# Patient Record
Sex: FEMALE | Race: WHITE | Employment: FULL TIME | URBAN - METROPOLITAN AREA
[De-identification: names, ages, dates, MRNs, and addresses within clinical notes are randomized per-mention and may not be internally consistent; named-entity substitution may affect disease eponyms.]

---

## 2017-02-18 DIAGNOSIS — K21.9 GASTROESOPHAGEAL REFLUX DISEASE, ESOPHAGITIS PRESENCE NOT SPECIFIED: ICD-10-CM

## 2017-02-18 NOTE — TELEPHONE ENCOUNTER
Omeprazole DR 20MG Capsule      Last Written Prescription Date: 10/25/2016  Last Fill Quantity: 60,  # refills: 1   Last Office Visit with [facility], [facility] or [facility] prescribing provider: 10/25/2016

## 2017-02-20 NOTE — TELEPHONE ENCOUNTER
Prescription approved per [facility] Refill Protocol.  Sophia Pringle, RN  Triage Nurse

## 2017-03-02 ENCOUNTER — OFFICE VISIT (OUTPATIENT)
Dept: FAMILY MEDICINE | Facility: CLINIC | Age: 47
End: 2017-03-02
Payer: COMMERCIAL

## 2017-03-02 VITALS
DIASTOLIC BLOOD PRESSURE: 76 MMHG | SYSTOLIC BLOOD PRESSURE: 132 MMHG | BODY MASS INDEX: 34.06 KG/M2 | TEMPERATURE: 99.7 F | OXYGEN SATURATION: 98 % | WEIGHT: 211 LBS | HEART RATE: 97 BPM | RESPIRATION RATE: 16 BRPM

## 2017-03-02 DIAGNOSIS — J01.90 ACUTE SINUSITIS WITH SYMPTOMS > 10 DAYS: Primary | ICD-10-CM

## 2017-03-02 DIAGNOSIS — J20.9 ACUTE BRONCHITIS WITH SYMPTOMS > 10 DAYS: ICD-10-CM

## 2017-03-02 PROCEDURE — 99214 OFFICE O/P EST MOD 30 MIN: CPT | Performed by: PHYSICIAN ASSISTANT

## 2017-03-02 RX ORDER — BRIMONIDINE TARTRATE 1 MG/ML
SOLUTION/ DROPS OPHTHALMIC
Refills: 2 | COMMUNITY
Start: 2017-02-24 | End: 2021-06-04

## 2017-03-02 RX ORDER — BENZONATATE 100 MG/1
100 CAPSULE ORAL 3 TIMES DAILY PRN
Qty: 42 CAPSULE | Refills: 0 | Status: SHIPPED | OUTPATIENT
Start: 2017-03-02 | End: 2017-05-11

## 2017-03-02 RX ORDER — ALBUTEROL SULFATE 90 UG/1
2 AEROSOL, METERED RESPIRATORY (INHALATION) EVERY 6 HOURS PRN
Qty: 1 INHALER | Refills: 0 | Status: SHIPPED | OUTPATIENT
Start: 2017-03-02 | End: 2021-06-04

## 2017-03-02 NOTE — MR AVS SNAPSHOT
After Visit Summary   3/2/2017    Mira Luis    MRN: [id number]           Patient Information     Date Of Birth          1970        Visit Information        Provider Department      3/2/2017 2:20 PM Chana Carlin PA-C [facility]        Today's Diagnoses     Acute sinusitis with symptoms > 10 days    -  1    Acute bronchitis with symptoms > 10 days          Care Instructions      Acute Bacterial Rhinosinusitis (ABRS)  Acute bacterial rhinosinusitis (ABRS) is an infection of your nasal cavity and sinuses. It s caused by bacteria. Acute means that you ve had symptoms for less than 12 weeks.  Understanding your sinuses  The nasal cavity is the large air-filled space behind your nose. The sinuses are a group of spaces formed by the bones of your face. They connect with your nasal cavity. ABRS causes the tissue lining these spaces to become inflamed. Mucus may not drain normally. This leads to facial pain and other symptoms.  What causes ABRS?  ABRS most often follows an upper respiratory infection caused by a virus. Bacteria then infect the lining of your nasal cavity and sinuses. But you can also get ABRS if you have:    Nasal allergies    Long-term nasal swelling and congestion not caused by allergies    Blockage in the nose  Symptoms of ABRS  The symptoms of ABRS may be different for each person, and can include:    Nasal congestion    Runny nose    Fluid draining from the nose down the throat (postnasal drip)    Headache    Cough    Pain in the sinuses    Thick, colored fluid from the nose (mucus)    Fever  Diagnosing ABRS  ABRS may be diagnosed if you ve had an upper respiratory infection like a cold and cough for longer than 10 to 14 days. Your health care provider will ask about your symptoms and your medical history. The provider will check your vital signs, including your temperature. You ll have a physical exam. The health care provider will check your ears, nose,  and throat. You likely won t need any tests. If ABRS comes back, you may have a culture or other tests.  Treatment for ABRS  Treatment may include:    Antibiotic medicine. This is for symptoms that last for at least 10 to 14 days.    Nasal corticosteroid medicine. Drops or spray used in the nose can lessen swelling and congestion.    Over-the-counter pain medicine. This is to lessen sinus pain and pressure.    Nasal decongestant medicine. Spray or drops may help to lessen congestion. Do not use them for more than a few days.    Salt wash (saline irrigation). This can help to loosen mucus.  Possible complications of ABRS  ABRS may come back or become long-term (chronic).  In rare cases, ABRS may cause complications such as:     Inflamed tissue around the brain and spinal cord (meningitis)    Inflamed tissue around the eyes (orbital cellulitis)    Inflamed bones around the sinuses (osteitis)  These problems may need to be treated in a hospital with intravenous (IV) antibiotic medicine or surgery.  When to call the health care provider  Call your health care provider if you have any of the following:    Symptoms that don t get better, or get worse    Symptoms that don t get better after 3 to 5 days on antibiotics    Trouble seeing    Swelling around your eyes    Confusion or trouble staying awake        4301-3181 [employer]. [address] [place] [address], [place] [address]. All rights reserved. This information is not intended as a substitute for professional medical care. Always follow your healthcare professional's instructions.        What Is Acute Bronchitis?  Acute or short-term bronchitis last for days or weeks. It occurs when the bronchial tubes (airways in the lungs) are irritated by a virus, bacteria, or allergen. This causes a cough that produces yellow or greenish mucus.  Inside healthy lungs    Air travels in and out of the lungs through the airways. The linings of these airways produce sticky  mucus. This mucus traps particles that enter the lungs. Tiny structures called cilia then sweep the particles out of the airways.     Healthy airway: Airways are normally open. Air moves in and out easily.      Healthy cilia: Tiny, hairlike cilia sweep mucus and particles up and out of the airways.   Lungs with bronchitis  Bronchitis often occurs with a cold or the flu virus. The airways become inflamed (red and swollen). There is a deep  hacking  cough from the extra mucus. Other symptoms may include:    Wheezing    Chest discomfort    Shortness of breath    Mild fever  A second infection, this time due to bacteria, may then occur. And airways irritated by allergens or smoke are more likely to get infected.        Inflamed airway: Inflammation and extra mucus narrow the airway, causing shortness of breath.      Impaired cilia: Extra mucus impairs cilia, causing congestion and wheezing. Smoking makes the problem worse.   Making a diagnosis  A physical exam, health history, and certain tests help your healthcare provider make the diagnosis.  Health history  Your healthcare provider will ask you about your symptoms.  The exam  Your provider listens to your chest for signs of congestion. He or she may also check your ears, nose, and throat.  Possible tests    A sputum test for bacteria. This requires a sample of mucus from the lungs.    A nasal or throat swab for bacterial infection.    A chest X-ray if your healthcare provider thinks you have pneumonia.    Tests to check for an underlying condition, such as allergies, asthma, or COPD. You may need to see a specialist for more lung function testing.  Treating a cough  The main treatment for bronchitis is easing symptoms. Avoiding smoke, allergens, and other things that trigger coughing can often help. If the infection is bacterial, you may be given antibiotics. During the illness, it's important to get plenty of sleep. To ease symptoms:    Don t smoke, and avoid  secondhand smoke.    Use a humidifier, or breathe in steam from a hot shower. This may help loosen mucus.    Drink a lot of water and juice. They can soothe the throat and may help thin mucus.    Sit up or use extra pillows when in bed to help lessen coughing and congestion.    Ask your provider about using cough medicine, pain and fever medicine, or a decongestant.  Antibiotics  Most cases of bronchitis are caused by cold or flu viruses. Antibiotics don t treat viral illness. Taking antibiotics when they are not needed increases your risk of getting an infection later that is antibiotic-resistant. Your provider will prescribe antibiotics if the infection is caused by bacteria. If they are prescribed:    Take the medicine until it is used up, even if symptoms have improved. If you don t, the bronchitis may come back.    Take them as directed. For instance, some medicines should be taken with food.    Ask your provider or pharmacist what side effects are common, and what to do about them.  Follow-up care  You should see your provider again in 2 to 3 weeks. By this time, symptoms should have improved. An infection that lasts longer may mean you have a more serious problem.  Prevention    Avoid tobacco smoke. If you smoke, quit. Stay away from smoky places. Ask friends and family not to smoke around you, or in your home or car.    Get checked for allergies.    Ask your provider about getting a yearly flu shot, and pneumococcal or pneumonia shots.    Wash your hands often. This helps reduce the chance of picking up viruses that cause colds and flu.  Call your healthcare provider if:    Symptoms worsen, or new symptoms develop.    Breathing problems worsen or  become severe.    Symptoms don t get better within a week, or within 3 days of taking antibiotics.     3647-2633 [employer]. [address] [place] [address], [place] [address]. All rights reserved. This information is not intended as a substitute for  professional medical care. Always follow your healthcare professional's instructions.              Follow-ups after your visit        Who to contact     If you have questions or need follow up information about today's clinic visit or your schedule please contact [facility] directly at [phone].  Normal or non-critical lab and imaging results will be communicated to you by MyChart, letter or phone within 4 business days after the clinic has received the results. If you do not hear from us within 7 days, please contact the clinic through [employer] or phone. If you have a critical or abnormal lab result, we will notify you by phone as soon as possible.  Submit refill requests through [employer] or call your pharmacy and they will forward the refill request to us. Please allow 3 business days for your refill to be completed.          Additional Information About Your Visit        MyChart Information     [employer] gives you secure access to your electronic health record. If you see a primary care provider, you can also send messages to your care team and make appointments. If you have questions, please call your primary care clinic.  If you do not have a primary care provider, please call [phone] and they will assist you.        Care EveryWhere ID     This is your Care EveryWhere ID. This could be used by other organizations to access your [place] medical records  [id number]        Your Vitals Were     Pulse Temperature Respirations Pulse Oximetry BMI (Body Mass Index)       97 99.7  F (37.6  C) (Tympanic) 16 98% 34.06 kg/m2        Blood Pressure from Last 3 Encounters:   03/02/17 132/76   10/25/16 108/68   10/22/16 (!) 139/94    Weight from Last 3 Encounters:   03/02/17 211 lb (95.7 kg)   10/25/16 211 lb 9.6 oz (96 kg)   10/22/16 206 lb 1.6 oz (93.5 kg)              Today, you had the following     No orders found for display         Today's Medication Changes          These changes are accurate as  of: 3/2/17  3:13 PM.  If you have any questions, ask your nurse or doctor.               Start taking these medicines.        Dose/Directions    albuterol 108 (90 BASE) MCG/ACT Inhaler   Commonly known as:  PROAIR HFA/PROVENTIL HFA/VENTOLIN HFA   Used for:  Acute bronchitis with symptoms > 10 days   Started by:  Chana Carlin PA-C        Dose:  2 puff   Inhale 2 puffs into the lungs every 6 hours as needed for shortness of breath / dyspnea or wheezing   Quantity:  1 Inhaler   Refills:  0       amoxicillin-clavulanate 875-125 MG per tablet   Commonly known as:  AUGMENTIN   Used for:  Acute sinusitis with symptoms > 10 days, Acute bronchitis with symptoms > 10 days   Started by:  Chana Carlin PA-C        Dose:  1 tablet   Take 1 tablet by mouth 2 times daily for 10 days   Quantity:  20 tablet   Refills:  0       benzonatate 100 MG capsule   Commonly known as:  TESSALON   Used for:  Acute bronchitis with symptoms > 10 days   Started by:  Chana Carlin PA-C        Dose:  100 mg   Take 1 capsule (100 mg) by mouth 3 times daily as needed   Quantity:  42 capsule   Refills:  0            Where to get your medicines      These medications were sent to [facility] [address]  [address] [facility] [place] [address]     Phone:  [phone]     albuterol 108 (90 BASE) MCG/ACT Inhaler    amoxicillin-clavulanate 875-125 MG per tablet    benzonatate 100 MG capsule                Primary Care Provider Office Phone # Fax LILIANA Feng [phone]       [facility] [address] [place] [address]        Thank you!     Thank you for choosing [facility]  for your care. Our goal is always to provide you with excellent care. Hearing back from our patients is one way we can continue to improve our services. Please take a few minutes to complete the written survey that you may receive in the mail  after your visit with us. Thank you!             Your Updated Medication List - Protect others around you: Learn how to safely use, store and throw away your medicines at www.disposemymeds.org.          This list is accurate as of: 3/2/17  3:13 PM.  Always use your most recent med list.                   Brand Name Dispense Instructions for use    albuterol 108 (90 BASE) MCG/ACT Inhaler    PROAIR HFA/PROVENTIL HFA/VENTOLIN HFA    1 Inhaler    Inhale 2 puffs into the lungs every 6 hours as needed for shortness of breath / dyspnea or wheezing       ALPHAGAN P 0.1 % ophthalmic solution   Generic drug:  brimonidine          amoxicillin-clavulanate 875-125 MG per tablet    AUGMENTIN    20 tablet    Take 1 tablet by mouth 2 times daily for 10 days       benzonatate 100 MG capsule    TESSALON    42 capsule    Take 1 capsule (100 mg) by mouth 3 times daily as needed       DAILY MULTIVITAMIN PO          GLUCOSAMINE CHONDROITIN ADV PO      None Entered       IBUPROFEN PO          imiquimod 5 % cream    ALDARA    12 packet    Apply a small sized amount to warts or molluscum three times weekly at bedtime.   Wash off after 8 hours.   May use for up to 16 weeks.       IRON SUPPLEMENT PO          levonorgestrel-ethinyl estradiol 0.1-20 MG-MCG per tablet    AVIANE,ALESSE,LESSINA    84 tablet    Take 1 tablet by mouth daily       MAGNESIUM PO      Take  by mouth daily.       omeprazole 20 MG CR capsule    priLOSEC    60 capsule    Take 1 capsule (20 mg) by mouth daily       vitamin B complex with vitamin C Tabs tablet      Take 1 tablet by mouth daily       vitamin D 1000 UNITS capsule      Take 1 capsule by mouth daily

## 2017-03-02 NOTE — NURSING NOTE
"Chief Complaint   Patient presents with     URI       Initial /76 (BP Location: Right arm, Patient Position: Chair, Cuff Size: Adult Large)  Pulse 97  Temp 99.7  F (37.6  C) (Tympanic)  Resp 16  Wt 211 lb (95.7 kg)  SpO2 98%  BMI 34.06 kg/m2 Estimated body mass index is 34.06 kg/(m^2) as calculated from the following:    Height as of 10/25/16: 5' 6\" (1.676 m).    Weight as of this encounter: 211 lb (95.7 kg).  Medication Reconciliation: complete     Manda Cunha, CMA    "

## 2017-03-02 NOTE — PROGRESS NOTES
SUBJECTIVE:                                                    Mira Luis is a 46 year old female who presents to clinic today for the following health issues:      RESPIRATORY SYMPTOMS      Duration: February 7th    Description  nasal congestion, dry cough, fatigue/malaise, hoarse voice and myalgias (sinus pro\blems and body aches began yesterday    Severity: moderate    Accompanying signs and symptoms: None    History (predisposing factors):  none    Precipitating or alleviating factors: None    Therapies tried and outcome:  oral decongestant, cough suppressant, Mucinex, cough drops     Over the past 24 hours patient has developed fever, chills, body aches and increased pressure in her sinuses.     Problem list and histories reviewed & adjusted, as indicated.  Additional history: as documented    Patient Active Problem List   Diagnosis     CARDIOVASCULAR SCREENING; LDL GOAL LESS THAN 160     Uterine fibroid     LSIL (low grade squamous intraepithelial lesion) on Pap smear     AGW (anogenital warts)     Wound infection after surgery     Condyloma     Dysuria     Fatigue     Obesity (BMI 30-39.9)     Palpitations     High vitamin D level     Past Surgical History   Procedure Laterality Date     Surgical history of -   2/2008     Fibula Fx     Excise vulva wide local  1/16/2013     Procedure: EXCISE VULVA WIDE LOCAL;  Vaginal wide local excision of the vulva.   Diagnosis: vulva chondyloma.;  Surgeon: Pretty Collado MD;  Location:  OR       Social History   Substance Use Topics     Smoking status: Never Smoker     Smokeless tobacco: Never Used     Alcohol use Yes      Comment: 1 time per week, 2 per time     Family History   Problem Relation Age of Onset     Hypertension Maternal Grandmother      CANCER Maternal Aunt      CANCER Paternal Aunt      DIABETES No family hx of      CEREBROVASCULAR DISEASE No family hx of      Thyroid Disease No family hx of      Glaucoma No family hx of      Macular  Degeneration No family hx of          Current Outpatient Prescriptions   Medication Sig Dispense Refill     ALPHAGAN P 0.1 % ophthalmic solution   2     amoxicillin-clavulanate (AUGMENTIN) 875-125 MG per tablet Take 1 tablet by mouth 2 times daily for 10 days 20 tablet 0     albuterol (PROAIR HFA/PROVENTIL HFA/VENTOLIN HFA) 108 (90 BASE) MCG/ACT Inhaler Inhale 2 puffs into the lungs every 6 hours as needed for shortness of breath / dyspnea or wheezing 1 Inhaler 0     benzonatate (TESSALON) 100 MG capsule Take 1 capsule (100 mg) by mouth 3 times daily as needed 42 capsule 0     omeprazole (PRILOSEC) 20 MG CR capsule Take 1 capsule (20 mg) by mouth daily 60 capsule 3     levonorgestrel-ethinyl estradiol (AVIANE,ALESSE,LESSINA) 0.1-20 MG-MCG per tablet Take 1 tablet by mouth daily 84 tablet 3     IBUPROFEN PO        Ferrous Sulfate (IRON SUPPLEMENT PO)        vitamin  B complex with vitamin C (VITAMIN  B COMPLEX) TABS Take 1 tablet by mouth daily       imiquimod (ALDARA) 5 % cream Apply a small sized amount to warts or molluscum three times weekly at bedtime.   Wash off after 8 hours.   May use for up to 16 weeks. 12 packet 6     Multiple Vitamin (DAILY MULTIVITAMIN PO)        Cholecalciferol (VITAMIN D) 1000 UNITS capsule Take 1 capsule by mouth daily       MAGNESIUM PO Take  by mouth daily.       GLUCOSAMINE CHONDROITIN ADV PO None Entered       Allergies   Allergen Reactions     Shellfish Allergy      Throat closed     Sulfa Drugs        Reviewed and updated as needed this visit by clinical staff       Reviewed and updated as needed this visit by Provider         ROS:  Constitutional, HEENT, cardiovascular, pulmonary, GI, , musculoskeletal, neuro, skin, endocrine and psych systems are negative, except as otherwise noted.    OBJECTIVE:                                                    /76 (BP Location: Right arm, Patient Position: Chair, Cuff Size: Adult Large)  Pulse 97  Temp 99.7  F (37.6  C) (Tympanic)   Resp 16  Wt 211 lb (95.7 kg)  SpO2 98%  BMI 34.06 kg/m2  Body mass index is 34.06 kg/(m^2).  GENERAL: healthy, alert and no distress  EYES: Eyes grossly normal to inspection, PERRL and conjunctivae and sclerae normal  HENT: normal cephalic/atraumatic, ear canals and TM's normal, nasal mucosa edematous , rhinorrhea yellow, oropharynx clear and oral mucous membranes moist  NECK: no adenopathy, no asymmetry, masses, or scars and thyroid normal to palpation  RESP: lungs clear to auscultation - no rales, rhonchi or wheezes  CV: regular rate and rhythm, normal S1 S2, no S3 or S4, no murmur, click or rub, no peripheral edema and peripheral pulses strong  ABDOMEN: soft, nontender, no hepatosplenomegaly, no masses and bowel sounds normal  MS: no gross musculoskeletal defects noted, no edema    Diagnostic Test Results:  none      ASSESSMENT/PLAN:                                                    1. Acute sinusitis with symptoms > 10 days  - amoxicillin-clavulanate (AUGMENTIN) 875-125 MG per tablet; Take 1 tablet by mouth 2 times daily for 10 days  Dispense: 20 tablet; Refill: 0    2. Acute bronchitis with symptoms > 10 days        Push fluids and rest. Honey for cough, humidified air at night.     - amoxicillin-clavulanate (AUGMENTIN) 875-125 MG per tablet; Take 1 tablet by mouth 2 times daily for 10 days  Dispense: 20 tablet; Refill: 0  - albuterol (PROAIR HFA/PROVENTIL HFA/VENTOLIN HFA) 108 (90 BASE) MCG/ACT Inhaler; Inhale 2 puffs into the lungs every 6 hours as needed for shortness of breath / dyspnea or wheezing  Dispense: 1 Inhaler; Refill: 0  - benzonatate (TESSALON) 100 MG capsule; Take 1 capsule (100 mg) by mouth 3 times daily as needed  Dispense: 42 capsule; Refill: 0  I have discussed any lab or imaging results, the patient's diagnosis, and my plan of treatment with the patient and/or family. Patient is aware to come back in with worsening symptoms or if no relief despite treatment plan.  Patient voiced  understanding and had no further questions.     AMELIA RajputC  [facility]

## 2017-03-02 NOTE — PATIENT INSTRUCTIONS
Acute Bacterial Rhinosinusitis (ABRS)  Acute bacterial rhinosinusitis (ABRS) is an infection of your nasal cavity and sinuses. It s caused by bacteria. Acute means that you ve had symptoms for less than 12 weeks.  Understanding your sinuses  The nasal cavity is the large air-filled space behind your nose. The sinuses are a group of spaces formed by the bones of your face. They connect with your nasal cavity. ABRS causes the tissue lining these spaces to become inflamed. Mucus may not drain normally. This leads to facial pain and other symptoms.  What causes ABRS?  ABRS most often follows an upper respiratory infection caused by a virus. Bacteria then infect the lining of your nasal cavity and sinuses. But you can also get ABRS if you have:    Nasal allergies    Long-term nasal swelling and congestion not caused by allergies    Blockage in the nose  Symptoms of ABRS  The symptoms of ABRS may be different for each person, and can include:    Nasal congestion    Runny nose    Fluid draining from the nose down the throat (postnasal drip)    Headache    Cough    Pain in the sinuses    Thick, colored fluid from the nose (mucus)    Fever  Diagnosing ABRS  ABRS may be diagnosed if you ve had an upper respiratory infection like a cold and cough for longer than 10 to 14 days. Your health care provider will ask about your symptoms and your medical history. The provider will check your vital signs, including your temperature. You ll have a physical exam. The health care provider will check your ears, nose, and throat. You likely won t need any tests. If ABRS comes back, you may have a culture or other tests.  Treatment for ABRS  Treatment may include:    Antibiotic medicine. This is for symptoms that last for at least 10 to 14 days.    Nasal corticosteroid medicine. Drops or spray used in the nose can lessen swelling and congestion.    Over-the-counter pain medicine. This is to lessen sinus pain and pressure.    Nasal  decongestant medicine. Spray or drops may help to lessen congestion. Do not use them for more than a few days.    Salt wash (saline irrigation). This can help to loosen mucus.  Possible complications of ABRS  ABRS may come back or become long-term (chronic).  In rare cases, ABRS may cause complications such as:     Inflamed tissue around the brain and spinal cord (meningitis)    Inflamed tissue around the eyes (orbital cellulitis)    Inflamed bones around the sinuses (osteitis)  These problems may need to be treated in a hospital with intravenous (IV) antibiotic medicine or surgery.  When to call the health care provider  Call your health care provider if you have any of the following:    Symptoms that don t get better, or get worse    Symptoms that don t get better after 3 to 5 days on antibiotics    Trouble seeing    Swelling around your eyes    Confusion or trouble staying awake        4432-4251 [employer]. [address] [place] [address]. All rights reserved. This information is not intended as a substitute for professional medical care. Always follow your healthcare professional's instructions.        What Is Acute Bronchitis?  Acute or short-term bronchitis last for days or weeks. It occurs when the bronchial tubes (airways in the lungs) are irritated by a virus, bacteria, or allergen. This causes a cough that produces yellow or greenish mucus.  Inside healthy lungs    Air travels in and out of the lungs through the airways. The linings of these airways produce sticky mucus. This mucus traps particles that enter the lungs. Tiny structures called cilia then sweep the particles out of the airways.     Healthy airway: Airways are normally open. Air moves in and out easily.      Healthy cilia: Tiny, hairlike cilia sweep mucus and particles up and out of the airways.   Lungs with bronchitis  Bronchitis often occurs with a cold or the flu virus. The airways become inflamed (red and  swollen). There is a deep  hacking  cough from the extra mucus. Other symptoms may include:    Wheezing    Chest discomfort    Shortness of breath    Mild fever  A second infection, this time due to bacteria, may then occur. And airways irritated by allergens or smoke are more likely to get infected.        Inflamed airway: Inflammation and extra mucus narrow the airway, causing shortness of breath.      Impaired cilia: Extra mucus impairs cilia, causing congestion and wheezing. Smoking makes the problem worse.   Making a diagnosis  A physical exam, health history, and certain tests help your healthcare provider make the diagnosis.  Health history  Your healthcare provider will ask you about your symptoms.  The exam  Your provider listens to your chest for signs of congestion. He or she may also check your ears, nose, and throat.  Possible tests    A sputum test for bacteria. This requires a sample of mucus from the lungs.    A nasal or throat swab for bacterial infection.    A chest X-ray if your healthcare provider thinks you have pneumonia.    Tests to check for an underlying condition, such as allergies, asthma, or COPD. You may need to see a specialist for more lung function testing.  Treating a cough  The main treatment for bronchitis is easing symptoms. Avoiding smoke, allergens, and other things that trigger coughing can often help. If the infection is bacterial, you may be given antibiotics. During the illness, it's important to get plenty of sleep. To ease symptoms:    Don t smoke, and avoid secondhand smoke.    Use a humidifier, or breathe in steam from a hot shower. This may help loosen mucus.    Drink a lot of water and juice. They can soothe the throat and may help thin mucus.    Sit up or use extra pillows when in bed to help lessen coughing and congestion.    Ask your provider about using cough medicine, pain and fever medicine, or a decongestant.  Antibiotics  Most cases of bronchitis are caused by  cold or flu viruses. Antibiotics don t treat viral illness. Taking antibiotics when they are not needed increases your risk of getting an infection later that is antibiotic-resistant. Your provider will prescribe antibiotics if the infection is caused by bacteria. If they are prescribed:    Take the medicine until it is used up, even if symptoms have improved. If you don t, the bronchitis may come back.    Take them as directed. For instance, some medicines should be taken with food.    Ask your provider or pharmacist what side effects are common, and what to do about them.  Follow-up care  You should see your provider again in 2 to 3 weeks. By this time, symptoms should have improved. An infection that lasts longer may mean you have a more serious problem.  Prevention    Avoid tobacco smoke. If you smoke, quit. Stay away from smoky places. Ask friends and family not to smoke around you, or in your home or car.    Get checked for allergies.    Ask your provider about getting a yearly flu shot, and pneumococcal or pneumonia shots.    Wash your hands often. This helps reduce the chance of picking up viruses that cause colds and flu.  Call your healthcare provider if:    Symptoms worsen, or new symptoms develop.    Breathing problems worsen or  become severe.    Symptoms don t get better within a week, or within 3 days of taking antibiotics.     7493-8452 [employer]. [address] [place] [address], [place] [address]. All rights reserved. This information is not intended as a substitute for professional medical care. Always follow your healthcare professional's instructions.

## 2017-03-16 ENCOUNTER — HOSPITAL ENCOUNTER (OUTPATIENT)
Dept: MAMMOGRAPHY | Facility: CLINIC | Age: 47
Discharge: HOME | End: 2017-03-16
Attending: NURSE PRACTITIONER | Admitting: NURSE PRACTITIONER
Payer: COMMERCIAL

## 2017-03-16 DIAGNOSIS — Z12.31 VISIT FOR SCREENING MAMMOGRAM: ICD-10-CM

## 2017-03-16 PROCEDURE — G0202 SCR MAMMO BI INCL CAD: HCPCS

## 2017-04-05 ENCOUNTER — OFFICE VISIT (OUTPATIENT)
Dept: PEDIATRICS | Facility: CLINIC | Age: 47
End: 2017-04-05
Payer: COMMERCIAL

## 2017-04-05 VITALS
SYSTOLIC BLOOD PRESSURE: 124 MMHG | HEART RATE: 79 BPM | HEIGHT: 66 IN | TEMPERATURE: 98.2 F | OXYGEN SATURATION: 93 % | DIASTOLIC BLOOD PRESSURE: 81 MMHG | WEIGHT: 211.6 LBS | BODY MASS INDEX: 34.01 KG/M2

## 2017-04-05 DIAGNOSIS — J32.1 CHRONIC FRONTAL SINUSITIS: ICD-10-CM

## 2017-04-05 DIAGNOSIS — H81.11 BPPV (BENIGN PAROXYSMAL POSITIONAL VERTIGO), RIGHT: Primary | ICD-10-CM

## 2017-04-05 PROCEDURE — 99213 OFFICE O/P EST LOW 20 MIN: CPT | Performed by: INTERNAL MEDICINE

## 2017-04-05 RX ORDER — CETIRIZINE HYDROCHLORIDE, PSEUDOEPHEDRINE HYDROCHLORIDE 5; 120 MG/1; MG/1
1 TABLET, FILM COATED, EXTENDED RELEASE ORAL 2 TIMES DAILY
Qty: 28 TABLET | Refills: 0 | Status: SHIPPED | OUTPATIENT
Start: 2017-04-05 | End: 2021-06-04

## 2017-04-05 NOTE — MR AVS SNAPSHOT
After Visit Summary   4/5/2017    Mira Luis    MRN: [id number]           Patient Information     Date Of Birth          1970        Visit Information        Provider Department      4/5/2017 1:00 PM Valdo Owen MD [facility]        Today's Diagnoses     BPPV (benign paroxysmal positional vertigo), right    -  1    Chronic frontal sinusitis          Care Instructions      Benign Paroxysmal Positional Vertigo  Benign paroxysmal positional vertigo (BPPV) is a problem with the inner ear. The inner ear contains the vestibular system. This system is what helps you keep your balance. BPPV causes a feeling of spinning. It is a common problem of the vestibular system.  Understanding the vestibular system  The vestibular system of the ear is made up of very tiny parts. They include the utricle, saccule, and semicircular canals. The utricle is a tiny organ that contains calcium crystals. In some people, the crystals can move into the semicircular canals. When this happens, the system no longer works as it should. This causes BPPV. Benign means it is not life-threatening. Paroxysmal means it happens suddenly. Positional means that it happens when you move your head. Vertigo is a feeling of spinning.  What causes BPPV?  Causes include injury to your head or neck. Other problems with the vestibular system may cause BPPV. In many people, the cause of BPPV is not known.  Symptoms of BPPV  You many have repeated feelings of spinning (vertigo). The vertigo usually lasts less than 1 minute. Some movements, suchas rolling over in bed, can bring on vertigo.  Diagnosing BPPV  Your primary health care provider may diagnose and treat your BPPV. Or you may see an ear, nose, and throat doctor (otolaryngologist). In some cases, you may see a nervous system doctor (neurologist).  The health care provider will ask about your symptoms and your medical history. He or she will examine you. You may  have hearing and balance tests. As part of the exam, your health care provider may have you move your head and body in certain ways. If you have BPPV, the movements can bring on vertigo. Your provider will also look for abnormal movements of your eyes. You may have other tests to check your vestibular or nervous systems.  Treatment for BPPV  Your health care provider may try to move the calcium crystals. This is done by having you move your head and neck in certain ways. This treatment is safe and often works well. You may also be told to do these movements at home. You may still have vertigo for a few weeks. Your health care provider will recheck your symptoms, usually in about a month. Special physical therapy may also be part of treatment.  In rare cases surgery may be needed for BPPV that does not go away.     When to call the health care provider  Call your health care provider right away if you have any of these:    Symptoms that do not go away with treatment    Symptoms that get worse    New symptoms        2538-7327 [employer]. [address] [place] [address]. All rights reserved. This information is not intended as a substitute for professional medical care. Always follow your healthcare professional's instructions.        Benign Positional Vertigo    The inner ear is located behind the middle ear. It is a part of the balance center of the body. It contains small calcium particles within fluid filled canals (semi-circular canals). These particles can move out of position as a result of aging, head trauma or disease of the inner ear. Once that happens, movement of the head into certain positions may cause the particles to stimulate the inner ear and create the feeling of vertigo.  Vertigo is a false feeling of motion (as if you or the room is spinning). A vertigo attack may cause sudden nausea, vomiting and heavy sweating. Severe vertigo causes a loss of balance and may result in  falling. During an attack of vertigo, head movement and body position changes will worsen symptoms.  An episode of vertigo may last seconds, minutes or hours. Once you are over the first episode of vertigo, it may never return. Sometimes symptoms recur off and on over several weeks or longer.  Home Care:    If symptoms are severe, rest quietly in bed. Change positions slowly. There is usually one position that will feel best, such as lying on one side or lying on your back with your head slightly raised on pillows.    Do not drive or work with dangerous machinery for one week after symptoms disappear, in case of a sudden return of symptoms.    Take medicine as prescribed to relieve your symptoms. Unless another medicine was prescribed for nausea, vomiting and vertigo, you may use over-the-counter motion sickness pills, such as meclizine (Bonine, Bonamine, Antivert) or dimenhydrinate (Dramamine).  Follow Up  with your doctor or as directed by our staff. Report any persistent ringing in the ear or hearing loss to your doctor.  [NOTE: If you had a CT or MRI scan, it will be reviewed by a specialist. You will be notified of any new findings that may affect your care.]  Get Prompt Medical Attention  if any of the following occur:    Worsening of vertigo not controlled by the medicine prescribed    Repeated vomiting not controlled by the medicine prescribed    Increased weakness or fainting    Severe headache or unusual drowsiness or confusion    Weakness of an arm or leg or one side of the face    Difficulty with speech or vision    Seizure    6640-2128 [employer]. [address] [place] [address]. All rights reserved. This information is not intended as a substitute for professional medical care. Always follow your healthcare professional's instructions.        Understanding Sinus Problems    You don t often think about your sinuses until there s a problem. One day you realize you can t smell  dinner cooking. Or you find you often have problems breathing through your nose.  Symptoms of sinus problems  Sinus problems can cause uncomfortable symptoms. Your nose may run constantly. You might have trouble sleeping at night. You may even lose your sense of smell. Other symptoms can include:    Nasal congestion    Fullness in ears    Green, yellow, or bloody drainage from the nose    Trouble tasting food    Frequent headaches    Facial pain    Cough  When sinuses are blocked  If something blocks the passages in the nose or sinuses, mucus can t drain. Mucus-filled sinuses often become infected.    Colds cause the lining of the nose and sinuses to swell and make extra mucus. A buildup of mucus can lead to a more serious infection.    Allergies irritate turbinates and other tissues. This causes swelling, which can cause a blockage. Over time, this irritation can also lead to sacs of swollen tissue (polyps).    Polyps may form in both the sinuses and nose. Polyps can grow large enough to clog nasal passages and block drainage.    A crooked (deviated) septum may block nasal passages. This is often the result of an injury.    2769-9834 [employer]. [address] [place] [address]. All rights reserved. This information is not intended as a substitute for professional medical care. Always follow your healthcare professional's instructions.              Follow-ups after your visit        Additional Services     PHYSICAL THERAPY REFERRAL       *This therapy referral will be filtered to a centralized scheduling office at [facility] and the patient will receive a call to schedule an appointment at a [facility] location most convenient for them. *     [facility] provides Physical Therapy evaluation and treatment and many specialty services across the [facility] system.  If requesting a specialty program, please choose from the list below.    If you have not  heard from the scheduling office within 2 business days, please call [phone] for all locations, with the exception of [place], please call [phone].  Treatment: Evaluation & Treatment  Special Instructions/Modalities: vertigo    Special Programs: Balance/Vestibular    Please be aware that coverage of these services is subject to the terms and limitations of your health insurance plan.  Call member services at your health plan with any benefit or coverage questions.      **Note to Provider:  If you are referring outside of [facility] for the therapy appointment, please list the name of the location in the  special instructions  above, print the referral and give to the patient to schedule the appointment.                  Who to contact     If you have questions or need follow up information about today's clinic visit or your schedule please contact [facility] directly at [phone].  Normal or non-critical lab and imaging results will be communicated to you by advisorCONNECThart, letter or phone within 4 business days after the clinic has received the results. If you do not hear from us within 7 days, please contact the clinic through advisorCONNECThart or phone. If you have a critical or abnormal lab result, we will notify you by phone as soon as possible.  Submit refill requests through [employer] or call your pharmacy and they will forward the refill request to us. Please allow 3 business days for your refill to be completed.          Additional Information About Your Visit        Positionly Information     [employer] gives you secure access to your electronic health record. If you see a primary care provider, you can also send messages to your care team and make appointments. If you have questions, please call your primary care clinic.  If you do not have a primary care provider, please call [phone] and they will assist you.      [employer] is an electronic gateway that provides easy, online access to your medical  "records. With [employer], you can request a clinic appointment, read your test results, renew a prescription or communicate with your care team.     To access your existing account, please contact your [facility] or call [phone] for assistance.        Care EveryWhere ID     This is your Care EveryWhere ID. This could be used by other organizations to access your [facility] medical records  [id number]        Your Vitals Were     Pulse Temperature Height Pulse Oximetry BMI (Body Mass Index)       79 98.2  F (36.8  C) (Temporal) 5' 6\" (1.676 m) 93% 34.15 kg/m2        Blood Pressure from Last 3 Encounters:   04/05/17 124/81   03/02/17 132/76   10/25/16 108/68    Weight from Last 3 Encounters:   04/05/17 211 lb 9.6 oz (96 kg)   03/02/17 211 lb (95.7 kg)   10/25/16 211 lb 9.6 oz (96 kg)              We Performed the Following     PHYSICAL THERAPY REFERRAL          Today's Medication Changes          These changes are accurate as of: 4/5/17  1:31 PM.  If you have any questions, ask your nurse or doctor.               Start taking these medicines.        Dose/Directions    cetirizine-psuedoePHEDrine 5-120 MG per 12 hr tablet   Commonly known as:  ZYRTEC-D ALLERGY & CONGESTION   Used for:  Chronic frontal sinusitis   Started by:  Valdo Owen MD        Dose:  1 tablet   Take 1 tablet by mouth 2 times daily   Quantity:  28 tablet   Refills:  0            Where to get your medicines      Some of these will need a paper prescription and others can be bought over the counter.  Ask your nurse if you have questions.     Bring a paper prescription for each of these medications     cetirizine-psuedoePHEDrine 5-120 MG per 12 hr tablet                Primary Care Provider Office Phone # Fax #    LILIANA [facility] [phone]       [facility] [address] [place] [address]        Thank you!     Thank you for choosing [facility]" CLINICS  for your care. Our goal is always to provide you with excellent care. Hearing back from our patients is one way we can continue to improve our services. Please take a few minutes to complete the written survey that you may receive in the mail after your visit with us. Thank you!             Your Updated Medication List - Protect others around you: Learn how to safely use, store and throw away your medicines at www.disposemymeds.org.          This list is accurate as of: 4/5/17  1:31 PM.  Always use your most recent med list.                   Brand Name Dispense Instructions for use    albuterol 108 (90 BASE) MCG/ACT Inhaler    PROAIR HFA/PROVENTIL HFA/VENTOLIN HFA    1 Inhaler    Inhale 2 puffs into the lungs every 6 hours as needed for shortness of breath / dyspnea or wheezing       ALPHAGAN P 0.1 % ophthalmic solution   Generic drug:  brimonidine          benzonatate 100 MG capsule    TESSALON    42 capsule    Take 1 capsule (100 mg) by mouth 3 times daily as needed       cetirizine-psuedoePHEDrine 5-120 MG per 12 hr tablet    ZYRTEC-D ALLERGY & CONGESTION    28 tablet    Take 1 tablet by mouth 2 times daily       DAILY MULTIVITAMIN PO          GLUCOSAMINE CHONDROITIN ADV PO      None Entered       IBUPROFEN PO          imiquimod 5 % cream    ALDARA    12 packet    Apply a small sized amount to warts or molluscum three times weekly at bedtime.   Wash off after 8 hours.   May use for up to 16 weeks.       IRON SUPPLEMENT PO          levonorgestrel-ethinyl estradiol 0.1-20 MG-MCG per tablet    AVIANE,ALESSE,LESSINA    84 tablet    Take 1 tablet by mouth daily       MAGNESIUM PO      Take  by mouth daily.       omeprazole 20 MG CR capsule    priLOSEC    60 capsule    Take 1 capsule (20 mg) by mouth daily       vitamin B complex with vitamin C Tabs tablet      Take 1 tablet by mouth daily       vitamin D 1000 UNITS capsule      Take 1 capsule by mouth daily

## 2017-04-05 NOTE — PATIENT INSTRUCTIONS
Benign Paroxysmal Positional Vertigo  Benign paroxysmal positional vertigo (BPPV) is a problem with the inner ear. The inner ear contains the vestibular system. This system is what helps you keep your balance. BPPV causes a feeling of spinning. It is a common problem of the vestibular system.  Understanding the vestibular system  The vestibular system of the ear is made up of very tiny parts. They include the utricle, saccule, and semicircular canals. The utricle is a tiny organ that contains calcium crystals. In some people, the crystals can move into the semicircular canals. When this happens, the system no longer works as it should. This causes BPPV. Benign means it is not life-threatening. Paroxysmal means it happens suddenly. Positional means that it happens when you move your head. Vertigo is a feeling of spinning.  What causes BPPV?  Causes include injury to your head or neck. Other problems with the vestibular system may cause BPPV. In many people, the cause of BPPV is not known.  Symptoms of BPPV  You many have repeated feelings of spinning (vertigo). The vertigo usually lasts less than 1 minute. Some movements, suchas rolling over in bed, can bring on vertigo.  Diagnosing BPPV  Your primary health care provider may diagnose and treat your BPPV. Or you may see an ear, nose, and throat doctor (otolaryngologist). In some cases, you may see a nervous system doctor (neurologist).  The health care provider will ask about your symptoms and your medical history. He or she will examine you. You may have hearing and balance tests. As part of the exam, your health care provider may have you move your head and body in certain ways. If you have BPPV, the movements can bring on vertigo. Your provider will also look for abnormal movements of your eyes. You may have other tests to check your vestibular or nervous systems.  Treatment for BPPV  Your health care provider may try to move the calcium crystals. This is done  by having you move your head and neck in certain ways. This treatment is safe and often works well. You may also be told to do these movements at home. You may still have vertigo for a few weeks. Your health care provider will recheck your symptoms, usually in about a month. Special physical therapy may also be part of treatment.  In rare cases surgery may be needed for BPPV that does not go away.     When to call the health care provider  Call your health care provider right away if you have any of these:    Symptoms that do not go away with treatment    Symptoms that get worse    New symptoms        3296-1711 [employer]. [address] [place] [address]. All rights reserved. This information is not intended as a substitute for professional medical care. Always follow your healthcare professional's instructions.        Benign Positional Vertigo    The inner ear is located behind the middle ear. It is a part of the balance center of the body. It contains small calcium particles within fluid filled canals (semi-circular canals). These particles can move out of position as a result of aging, head trauma or disease of the inner ear. Once that happens, movement of the head into certain positions may cause the particles to stimulate the inner ear and create the feeling of vertigo.  Vertigo is a false feeling of motion (as if you or the room is spinning). A vertigo attack may cause sudden nausea, vomiting and heavy sweating. Severe vertigo causes a loss of balance and may result in falling. During an attack of vertigo, head movement and body position changes will worsen symptoms.  An episode of vertigo may last seconds, minutes or hours. Once you are over the first episode of vertigo, it may never return. Sometimes symptoms recur off and on over several weeks or longer.  Home Care:    If symptoms are severe, rest quietly in bed. Change positions slowly. There is usually one position that will feel  best, such as lying on one side or lying on your back with your head slightly raised on pillows.    Do not drive or work with dangerous machinery for one week after symptoms disappear, in case of a sudden return of symptoms.    Take medicine as prescribed to relieve your symptoms. Unless another medicine was prescribed for nausea, vomiting and vertigo, you may use over-the-counter motion sickness pills, such as meclizine (Bonine, Bonamine, Antivert) or dimenhydrinate (Dramamine).  Follow Up  with your doctor or as directed by our staff. Report any persistent ringing in the ear or hearing loss to your doctor.  [NOTE: If you had a CT or MRI scan, it will be reviewed by a specialist. You will be notified of any new findings that may affect your care.]  Get Prompt Medical Attention  if any of the following occur:    Worsening of vertigo not controlled by the medicine prescribed    Repeated vomiting not controlled by the medicine prescribed    Increased weakness or fainting    Severe headache or unusual drowsiness or confusion    Weakness of an arm or leg or one side of the face    Difficulty with speech or vision    Seizure    9323-4693 [employer]. [address] [place] [address]. All rights reserved. This information is not intended as a substitute for professional medical care. Always follow your healthcare professional's instructions.        Understanding Sinus Problems    You don t often think about your sinuses until there s a problem. One day you realize you can t smell dinner cooking. Or you find you often have problems breathing through your nose.  Symptoms of sinus problems  Sinus problems can cause uncomfortable symptoms. Your nose may run constantly. You might have trouble sleeping at night. You may even lose your sense of smell. Other symptoms can include:    Nasal congestion    Fullness in ears    Green, yellow, or bloody drainage from the nose    Trouble tasting food    Frequent  headaches    Facial pain    Cough  When sinuses are blocked  If something blocks the passages in the nose or sinuses, mucus can t drain. Mucus-filled sinuses often become infected.    Colds cause the lining of the nose and sinuses to swell and make extra mucus. A buildup of mucus can lead to a more serious infection.    Allergies irritate turbinates and other tissues. This causes swelling, which can cause a blockage. Over time, this irritation can also lead to sacs of swollen tissue (polyps).    Polyps may form in both the sinuses and nose. Polyps can grow large enough to clog nasal passages and block drainage.    A crooked (deviated) septum may block nasal passages. This is often the result of an injury.    [id number] [employer]. [address] [place] [address], [place] [address]. All rights reserved. This information is not intended as a substitute for professional medical care. Always follow your healthcare professional's instructions.

## 2017-04-05 NOTE — NURSING NOTE
"Chief Complaint   Patient presents with     Sinus Problem     Dizziness       Initial /81 (BP Location: Right arm, Patient Position: Chair, Cuff Size: Adult Large)  Pulse 79  Temp 98.2  F (36.8  C) (Temporal)  Ht 5' 6\" (1.676 m)  Wt 211 lb 9.6 oz (96 kg)  SpO2 93%  BMI 34.15 kg/m2 Estimated body mass index is 34.15 kg/(m^2) as calculated from the following:    Height as of this encounter: 5' 6\" (1.676 m).    Weight as of this encounter: 211 lb 9.6 oz (96 kg).  Medication Reconciliation: complete     Juanita Cavanaugh CMA    "

## 2017-04-05 NOTE — PROGRESS NOTES
SUBJECTIVE:                                                    Mira Luis is a 46 year old female who presents to clinic today for the following health issues:      Dizziness     Onset: Several weeks    Description:   Do you feel faint:  no   Does it feel like the surroundings (bed, room) are moving: YES  Unsteady/off balance: YES  Have you passed out or fallen: no     Intensity: moderate    Progression of Symptoms:  worsening and constant    Accompanying Signs & Symptoms:  Heart palpitations: YES- not un normal she states  Nausea, vomiting: YES- nausea   Weakness in arms or legs: no   Fatigue: YES- a lot   Vision or speech changes: YES- vision   Ringing in ears (Tinnitus): YES- buzzing   Hearing Loss: no   Pt. States she feel like her head is going to explode   History:   Head trauma/concussion hx: no   Previous similar symptoms: no   Recent bleeding history: no     Precipitating factors:   Worse with activity or head movement: YES  Any new medications (BP?): no   Alcohol/drug abuse/withdrawal: no     Alleviating factors:   Does staying in a fixed position give relief:  No- because surrounding will still be moving      Pt. States she does have a lingering sinus since Feb. 2017.       Therapies Tried and outcome: Ibuprofen , hydrated      HPI: Treated with Augmentin for sinus infection last month. Stopped the Augmentin because of the nausea and diarrhea after 4 days. Now has fullness on the right side of the head and dizziness when she changes her head position.    PMH:   Patient Active Problem List   Diagnosis     CARDIOVASCULAR SCREENING; LDL GOAL LESS THAN 160     Uterine fibroid     LSIL (low grade squamous intraepithelial lesion) on Pap smear     AGW (anogenital warts)     Wound infection after surgery     Condyloma     Dysuria     Fatigue     Obesity (BMI 30-39.9)     Palpitations     High vitamin D level     Past Surgical History:   Procedure Laterality Date     EXCISE VULVA WIDE LOCAL  1/16/2013     Procedure: EXCISE VULVA WIDE LOCAL;  Vaginal wide local excision of the vulva.   Diagnosis: vulva chondyloma.;  Surgeon: Pretty Collado MD;  Location: [facility] OR     SURGICAL HISTORY OF -   2/2008    Fibula Fx       Social History   Substance Use Topics     Smoking status: Never Smoker     Smokeless tobacco: Never Used     Alcohol use Yes      Comment: 1 time per week, 2 per time     Family History   Problem Relation Age of Onset     Hypertension Maternal Grandmother      CANCER Maternal Aunt      CANCER Paternal Aunt      DIABETES No family hx of      CEREBROVASCULAR DISEASE No family hx of      Thyroid Disease No family hx of      Glaucoma No family hx of      Macular Degeneration No family hx of          Current Outpatient Prescriptions   Medication Sig Dispense Refill     ALPHAGAN P 0.1 % ophthalmic solution   2     albuterol (PROAIR HFA/PROVENTIL HFA/VENTOLIN HFA) 108 (90 BASE) MCG/ACT Inhaler Inhale 2 puffs into the lungs every 6 hours as needed for shortness of breath / dyspnea or wheezing 1 Inhaler 0     benzonatate (TESSALON) 100 MG capsule Take 1 capsule (100 mg) by mouth 3 times daily as needed 42 capsule 0     omeprazole (PRILOSEC) 20 MG CR capsule Take 1 capsule (20 mg) by mouth daily 60 capsule 3     levonorgestrel-ethinyl estradiol (AVIANE,ALESSE,LESSINA) 0.1-20 MG-MCG per tablet Take 1 tablet by mouth daily 84 tablet 3     IBUPROFEN PO        Ferrous Sulfate (IRON SUPPLEMENT PO)        vitamin  B complex with vitamin C (VITAMIN  B COMPLEX) TABS Take 1 tablet by mouth daily       imiquimod (ALDARA) 5 % cream Apply a small sized amount to warts or molluscum three times weekly at bedtime.   Wash off after 8 hours.   May use for up to 16 weeks. 12 packet 6     Multiple Vitamin (DAILY MULTIVITAMIN PO)        Cholecalciferol (VITAMIN D) 1000 UNITS capsule Take 1 capsule by mouth daily       MAGNESIUM PO Take  by mouth daily.       GLUCOSAMINE CHONDROITIN ADV PO None Entered       Allergies   Allergen  "Reactions     Shellfish Allergy      Throat closed     Sulfa Drugs        ROS:  C: NEGATIVE for fever, chills, change in weight  ENT/MOUTH: POSITIVE for nasal congestion and postnasal drainage  R: NEGATIVE for significant cough or SOB    OBJECTIVE:                                                    /81 (BP Location: Right arm, Patient Position: Chair, Cuff Size: Adult Large)  Pulse 79  Temp 98.2  F (36.8  C) (Temporal)  Ht 5' 6\" (1.676 m)  Wt 211 lb 9.6 oz (96 kg)  SpO2 93%  BMI 34.15 kg/m2  Body mass index is 34.15 kg/(m^2).  GENERAL: healthy, alert and no distress  HENT: ear canals and TM's normal, nose and mouth without ulcers or lesions  NECK: no adenopathy, no asymmetry, masses, or scars and thyroid normal to palpation  RESP: lungs clear to auscultation - no rales, rhonchi or wheezes  CV: regular rates and rhythm, normal S1 S2, no S3 or S4 and no murmur, click or rub  NEURO: Normal strength and tone, mentation intact and speech normal. Brief nystagmus when she lied down  PSYCH: mentation appears normal, affect normal/bright    Diagnostic Test Results:  none      ASSESSMENT/PLAN:                                                    1. BPPV (benign paroxysmal positional vertigo), right    - PHYSICAL THERAPY REFERRAL    2. Chronic frontal sinusitis    - cetirizine-psuedoePHEDrine (ZYRTEC-D ALLERGY & CONGESTION) 5-120 MG per 12 hr tablet; Take 1 tablet by mouth 2 times daily  Dispense: 28 tablet; Refill: 0    I explained to the patient exactly what I thought was going on. Will try the antihistamine, decongestant and see if physical therapy can help the vertigo. If there is no improvement this week she is to call me back    Will call or return to clinic if worsening or symptoms not improving as discussed.  See Patient Instructions      Valdo Owen MD  [facility]    "

## 2017-04-11 ENCOUNTER — HOSPITAL ENCOUNTER (OUTPATIENT)
Dept: PHYSICAL THERAPY | Facility: CLINIC | Age: 47
Setting detail: THERAPIES SERIES
End: 2017-04-11
Attending: INTERNAL MEDICINE
Payer: COMMERCIAL

## 2017-04-11 PROCEDURE — 97162 PT EVAL MOD COMPLEX 30 MIN: CPT | Mod: GP

## 2017-04-11 PROCEDURE — 97112 NEUROMUSCULAR REEDUCATION: CPT | Mod: GP

## 2017-04-11 PROCEDURE — 40000840 ZZHC STATISTIC PT VESTIBULAR VISIT
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
## 2017-04-17 ENCOUNTER — TRANSFERRED RECORDS (OUTPATIENT)
Dept: HEALTH INFORMATION MANAGEMENT | Facility: CLINIC | Age: 47
End: 2017-04-17

## 2017-05-11 ENCOUNTER — OFFICE VISIT (OUTPATIENT)
Dept: PEDIATRICS | Facility: CLINIC | Age: 47
End: 2017-05-11
Payer: COMMERCIAL

## 2017-05-11 VITALS
HEIGHT: 66 IN | TEMPERATURE: 98.9 F | HEART RATE: 86 BPM | WEIGHT: 206.9 LBS | DIASTOLIC BLOOD PRESSURE: 80 MMHG | BODY MASS INDEX: 33.25 KG/M2 | OXYGEN SATURATION: 98 % | SYSTOLIC BLOOD PRESSURE: 130 MMHG

## 2017-05-11 DIAGNOSIS — R42 DIZZINESS: Primary | ICD-10-CM

## 2017-05-11 DIAGNOSIS — R51.9 PRESSURE IN HEAD: ICD-10-CM

## 2017-05-11 LAB — HGB BLD-MCNC: 14.7 G/DL (ref 11.7–15.7)

## 2017-05-11 PROCEDURE — 36415 COLL VENOUS BLD VENIPUNCTURE: CPT | Performed by: NURSE PRACTITIONER

## 2017-05-11 PROCEDURE — 99214 OFFICE O/P EST MOD 30 MIN: CPT | Performed by: NURSE PRACTITIONER

## 2017-05-11 PROCEDURE — 85018 HEMOGLOBIN: CPT | Performed by: NURSE PRACTITIONER

## 2017-05-11 PROCEDURE — 84443 ASSAY THYROID STIM HORMONE: CPT | Performed by: NURSE PRACTITIONER

## 2017-05-11 NOTE — NURSING NOTE
"Chief Complaint   Patient presents with     Dizziness     ongoing vertigo       Initial /80 (BP Location: Right arm, Patient Position: Chair, Cuff Size: Adult Regular)  Pulse 86  Temp 98.9  F (37.2  C) (Tympanic)  Ht 5' 6\" (1.676 m)  Wt 206 lb 14.4 oz (93.8 kg)  SpO2 98%  BMI 33.39 kg/m2 Estimated body mass index is 33.39 kg/(m^2) as calculated from the following:    Height as of this encounter: 5' 6\" (1.676 m).    Weight as of this encounter: 206 lb 14.4 oz (93.8 kg).  Medication Reconciliation: complete   Cathy Cai MA// May 11, 2017 2:23 PM          "

## 2017-05-11 NOTE — MR AVS SNAPSHOT
After Visit Summary   5/11/2017    Mira Luis    MRN: [id number]           Patient Information     Date Of Birth          1970        Visit Information        Provider Department      5/11/2017 2:00 PM Leanne Valdez APRN [facility] [place]        Today's Diagnoses     Dizziness    -  1    Pressure in head          Care Instructions      2:50 tomorrow with Dr. Coates in [place]          Follow-ups after your visit        Additional Services     NEUROLOGY ADULT REFERRAL       Your provider has referred you to: [facility] [phone]   [email or web address]    Reason for Referral: Consult    Please be aware that coverage of these services is subject to the terms and limitations of your health insurance plan.  Call member services at your health plan with any benefit or coverage questions.      Please bring the following with you to your appointment:    (1) Any X-Rays, CTs or MRIs which have been performed.  Contact the facility where they were done to arrange for  prior to your scheduled appointment.    (2) List of current medications  (3) This referral request   (4) Any documents/labs given to you for this referral            OTOLARYNGOLOGY REFERRAL       Your provider has referred you to: [facility]: [facility] [place] [phone]   [email or web address]    Please be aware that coverage of these services is subject to the terms and limitations of your health insurance plan.  Call member services at your health plan with any benefit or coverage questions.      Please bring the following with you to your appointment:    (1) Any X-Rays, CTs or MRIs which have been performed.  Contact the facility where they were done to arrange for  prior to your scheduled appointment.   (2) List of current medications  (3) This referral request   (4) Any documents/labs given to you for this  "referral                  Who to contact     If you have questions or need follow up information about today's clinic visit or your schedule please contact [facility] [place] directly at [phone].  Normal or non-critical lab and imaging results will be communicated to you by MyChart, letter or phone within 4 business days after the clinic has received the results. If you do not hear from us within 7 days, please contact the clinic through [facility] or phone. If you have a critical or abnormal lab result, we will notify you by phone as soon as possible.  Submit refill requests through Heyy or call your pharmacy and they will forward the refill request to us. Please allow 3 business days for your refill to be completed.          Additional Information About Your Visit        Heyy Information     Heyy gives you secure access to your electronic health record. If you see a primary care provider, you can also send messages to your care team and make appointments. If you have questions, please call your primary care clinic.  If you do not have a primary care provider, please call [phone] and they will assist you.        Care EveryWhere ID     This is your Care EveryWhere ID. This could be used by other organizations to access your [place] medical records  [id number]        Your Vitals Were     Pulse Temperature Height Pulse Oximetry BMI (Body Mass Index)       86 98.9  F (37.2  C) (Tympanic) 5' 6\" (1.676 m) 98% 33.39 kg/m2        Blood Pressure from Last 3 Encounters:   05/11/17 130/80   04/05/17 124/81   03/02/17 132/76    Weight from Last 3 Encounters:   05/11/17 206 lb 14.4 oz (93.8 kg)   04/05/17 211 lb 9.6 oz (96 kg)   03/02/17 211 lb (95.7 kg)              We Performed the Following     Hemoglobin     NEUROLOGY ADULT REFERRAL     OTOLARYNGOLOGY REFERRAL     TSH with free T4 reflex        Primary Care Provider Office Phone # Fax #    LILIANA Diaz [phone]    "    [facility] [place] [address] [place] [address]        Thank you!     Thank you for choosing [facility]  for your care. Our goal is always to provide you with excellent care. Hearing back from our patients is one way we can continue to improve our services. Please take a few minutes to complete the written survey that you may receive in the mail after your visit with us. Thank you!             Your Updated Medication List - Protect others around you: Learn how to safely use, store and throw away your medicines at www.disposemymeds.org.          This list is accurate as of: 5/11/17  2:51 PM.  Always use your most recent med list.                   Brand Name Dispense Instructions for use    albuterol 108 (90 BASE) MCG/ACT Inhaler    PROAIR HFA/PROVENTIL HFA/VENTOLIN HFA    1 Inhaler    Inhale 2 puffs into the lungs every 6 hours as needed for shortness of breath / dyspnea or wheezing       ALPHAGAN P 0.1 % ophthalmic solution   Generic drug:  brimonidine          cetirizine-psuedoePHEDrine 5-120 MG per 12 hr tablet    ZYRTEC-D ALLERGY & CONGESTION    28 tablet    Take 1 tablet by mouth 2 times daily       DAILY MULTIVITAMIN PO          GLUCOSAMINE CHONDROITIN ADV PO      Reported on 5/11/2017       IBUPROFEN PO          imiquimod 5 % cream    ALDARA    12 packet    Apply a small sized amount to warts or molluscum three times weekly at bedtime.   Wash off after 8 hours.   May use for up to 16 weeks.       IRON SUPPLEMENT PO          levonorgestrel-ethinyl estradiol 0.1-20 MG-MCG per tablet    AVIANE,ALESSE,LESSINA    84 tablet    Take 1 tablet by mouth daily       MAGNESIUM PO      Take  by mouth daily.       omeprazole 20 MG CR capsule    priLOSEC    60 capsule    Take 1 capsule (20 mg) by mouth daily       vitamin B complex with vitamin C Tabs tablet      Take 1 tablet by mouth daily       vitamin D 1000 UNITS capsule      Take 1 capsule by mouth daily

## 2017-05-11 NOTE — PROGRESS NOTES
"  SUBJECTIVE:                                                    Mira Luis is a 46 year old female who presents to clinic today for the following health issues:    Patient here for follow up of vertigo problem - last seen for issue 4/5/17 by Dr. Owen.    Dizziness      Duration: mid March head pressure     Description   Feeling faint:  no   Feeling like the surroundings are moving: no pt states feels like head is spinning inside  Loss of consciousness or falls: no     Intensity:  moderate    Accompanying signs and symptoms:   Nausea/vomitting: no   Palpitations: no   Weakness in arms or legs: no   Vision or speech changes: YES- notice speech changes while having dizziness, and noticed some blurred vision   Ringing in ears (Tinnitus): a couple of times   Hearing loss related to dizziness: no   Other (fevers/chills/sweating/dyspnea): no   Headache and pressure frontal     History (similar episodes/head trauma/previous evaluation/recent bleeding): None    Precipitating or alleviating factors (new meds/chemicals): None  Worse with activity/head movement: YES- moving head, moving eyes, movement in Periferal vision     Therapies tried and outcome: decongestant D, dramamine, nux vomica      February: Sinus infection. Took Augmentin for 4-5 days. Gave her diarrhea.   4/5 Started having Vertigo.  4/11: PT. Didn't help  4/17: Saw ENT- Mucinex D    Sometimes gets blurry vision when taking Dramamine. New stumbling over words.   Has had tremendous stress and exhaustion.   Now having waves of \"brain twisting\" sensation. Not necessarily super painful. Not feeling as much sinus pressure anymore. The meclizine does give her relief.     ROS: const/heent/neuro otherwise negative     OBJECTIVE:  /80 (BP Location: Right arm, Patient Position: Chair, Cuff Size: Adult Regular)  Pulse 86  Temp 98.9  F (37.2  C) (Tympanic)  Ht 5' 6\" (1.676 m)  Wt 206 lb 14.4 oz (93.8 kg)  SpO2 98%  BMI 33.39 kg/m2  CONSTITUTIONAL: Alert, " well-nourished, well-groomed, NAD  RESP: Lungs CTA. No wheeze, rhonchi, rales.  CV: HRRR S1 S2 No MRG. No peripheral edema  NEURO: CN II-XII grossly intact. No nystagmus. Speech and mentation appropriate. Normal gait. Romberg negative.   HEENT: Eyes: Conjunctiva pink and moist. Ears: Ear canals unremarkable. TMs pearly gray bilaterally. Bony landmarks and light reflexes intact. No erythema. Nose: Turbinates pink and moist. Throat: OP pink and moist. No tonsillar enlargement or exudates. No postnasal drip.  Neck: No lymphadenopathy or masses. Thyroid smooth, non-tender, and non-enlarged.      ASSESSMENT/PLAN:  (R42) Dizziness  (primary encounter diagnosis)  Comment: Patient with daily yet intermittent dizziness for the last month, directly after having a sinus infection. Sinus sx have cleared but still having dizziness. Movement makes the dizziness worse, but sx occur even without movement. Meclizine helps sx but positional PT did not improve sx. No hearing loss and only mild tinnitus, which may be unrelated. Reports feeing like she is stumbling over her words at times and also has blurry vision at times. No double vision or other neuro sx. Has been incredibly stressed at work. This doesn't seem like simple BPPV but I also don't see any glaring red flags. However the persistence of her sx and lack of improvement with PT therapy, combined with her mild vision and cognitive changes makes me think we should explore this further to rule out serious etiology such as MS, intercranial mass, etc.   Plan: TSH with free T4 reflex, Hemoglobin,         OTOLARYNGOLOGY REFERRAL, NEUROLOGY ADULT         REFERRAL        Referred to [facility].     (R51) Pressure in head  Plan: OTOLARYNGOLOGY REFERRAL              Leanne Valdez, ELADIO-HANNAH.

## 2017-05-12 LAB — TSH SERPL DL<=0.005 MIU/L-ACNC: 1.88 MU/L (ref 0.4–4)

## 2017-05-17 ENCOUNTER — TRANSFERRED RECORDS (OUTPATIENT)
Dept: HEALTH INFORMATION MANAGEMENT | Facility: CLINIC | Age: 47
End: 2017-05-17

## 2017-06-01 ENCOUNTER — TRANSFERRED RECORDS (OUTPATIENT)
Dept: HEALTH INFORMATION MANAGEMENT | Facility: CLINIC | Age: 47
End: 2017-06-01

## 2017-06-30 ENCOUNTER — OFFICE VISIT (OUTPATIENT)
Dept: PEDIATRICS | Facility: CLINIC | Age: 47
End: 2017-06-30
Payer: COMMERCIAL

## 2017-06-30 VITALS
SYSTOLIC BLOOD PRESSURE: 122 MMHG | DIASTOLIC BLOOD PRESSURE: 82 MMHG | WEIGHT: 210 LBS | HEIGHT: 66 IN | BODY MASS INDEX: 33.75 KG/M2 | TEMPERATURE: 98.4 F | OXYGEN SATURATION: 97 % | HEART RATE: 81 BPM

## 2017-06-30 DIAGNOSIS — G43.809 VESTIBULAR MIGRAINE: Primary | ICD-10-CM

## 2017-06-30 PROCEDURE — 99213 OFFICE O/P EST LOW 20 MIN: CPT | Performed by: NURSE PRACTITIONER

## 2017-06-30 RX ORDER — B-COMPLEX WITH VITAMIN C
TABLET ORAL
COMMUNITY
End: 2021-06-04

## 2017-06-30 NOTE — NURSING NOTE
"Chief Complaint   Patient presents with     Headache     and dizziness follow up       Initial /82 (Cuff Size: Adult Large)  Pulse 81  Temp 98.4  F (36.9  C) (Tympanic)  Ht 5' 6\" (1.676 m)  Wt 210 lb (95.3 kg)  SpO2 97%  BMI 33.89 kg/m2 Estimated body mass index is 33.89 kg/(m^2) as calculated from the following:    Height as of this encounter: 5' 6\" (1.676 m).    Weight as of this encounter: 210 lb (95.3 kg).  Medication Reconciliation: complete   Pretty Caldera CMA    "

## 2017-06-30 NOTE — PROGRESS NOTES
"  SUBJECTIVE:                                                    Mira Luis is a 46 year old female who presents to clinic today for the following health issues:    Patient here for follow up of headaches and dizziness.  Patient states things are better but still having some problems: migraine daily - but has had 2 days where it is more good than bad.Not currently taking imitrex, not currently on birth control. States hse wants to see a specialist as migraines are still happening daily.:    Has been diagnosed with vestibular migraine by [facility]. They won't let her see neuro until she does 16 PT sessions. Did several PT appointments but doesn't want to continue. Dizziness is nearly gone but migraines continue.     ROS: const/msk/neuro otherwise negative     OBJECTIVE:  /82 (Cuff Size: Adult Large)  Pulse 81  Temp 98.4  F (36.9  C) (Tympanic)  Ht 5' 6\" (1.676 m)  Wt 210 lb (95.3 kg)  SpO2 97%  BMI 33.89 kg/m2  CONSTITUTIONAL: Alert, well-nourished, well-groomed, NAD      ASSESSMENT/PLAN:  (G43.109) Vestibular migraine  (primary encounter diagnosis)  Comment: Per NDBC. Wishes to switch to neuro referral.   Plan: NEUROLOGY ADULT REFERRAL, NEUROLOGY ADULT         REFERRAL, CHIROPRACTIC REFERRAL, CHIROPRACTIC         REFERRAL        She agrees to f/u with neuro.       ELADIO Friend-HANNAH.        "

## 2017-06-30 NOTE — MR AVS SNAPSHOT
After Visit Summary   6/30/2017    Mira Luis    MRN: [id number]           Patient Information     Date Of Birth          1970        Visit Information        Provider Department      6/30/2017 3:40 PM Leanne Valdez APRN [facility]        Today's Diagnoses     Vestibular migraine    -  1       Follow-ups after your visit        Additional Services     NEUROLOGY ADULT REFERRAL       Your provider has referred you to: [facility]: [facility] [phone]   [email or web address]    Reason for Referral: Consult    Please be aware that coverage of these services is subject to the terms and limitations of your health insurance plan.  Call member services at your health plan with any benefit or coverage questions.      Please bring the following with you to your appointment:    (1) Any X-Rays, CTs or MRIs which have been performed.  Contact the facility where they were done to arrange for  prior to your scheduled appointment.    (2) List of current medications  (3) This referral request   (4) Any documents/labs given to you for this referral            NEUROLOGY ADULT REFERRAL       Your provider has referred you to: [facility]: [facility] [phone]   [email or web address]    Reason for Referral: Consult    Please be aware that coverage of these services is subject to the terms and limitations of your health insurance plan.  Call member services at your health plan with any benefit or coverage questions.      Please bring the following with you to your appointment:    (1) Any X-Rays, CTs or MRIs which have been performed.  Contact the facility where they were done to arrange for  prior to your scheduled appointment.    (2) List of current medications  (3) This referral request   (4) Any documents/labs given to you for this referral                  Who to contact     If  "you have questions or need follow up information about today's clinic visit or your schedule please contact [facility] directly at [phone].  Normal or non-critical lab and imaging results will be communicated to you by MyChart, letter or phone within 4 business days after the clinic has received the results. If you do not hear from us within 7 days, please contact the clinic through [facility] [employer] or phone. If you have a critical or abnormal lab result, we will notify you by phone as soon as possible.  Submit refill requests through StellaService or call your pharmacy and they will forward the refill request to us. Please allow 3 business days for your refill to be completed.          Additional Information About Your Visit        Stream TagsharTricentis Information     StellaService gives you secure access to your electronic health record. If you see a primary care provider, you can also send messages to your care team and make appointments. If you have questions, please call your primary care clinic.  If you do not have a primary care provider, please call [phone] and they will assist you.        Care EveryWhere ID     This is your Care EveryWhere ID. This could be used by other organizations to access your [facility] medical records  [id number]        Your Vitals Were     Pulse Temperature Height Pulse Oximetry BMI (Body Mass Index)       81 98.4  F (36.9  C) (Tympanic) 5' 6\" (1.676 m) 97% 33.89 kg/m2        Blood Pressure from Last 3 Encounters:   06/30/17 122/82   05/11/17 130/80   04/05/17 124/81    Weight from Last 3 Encounters:   06/30/17 210 lb (95.3 kg)   05/11/17 206 lb 14.4 oz (93.8 kg)   04/05/17 211 lb 9.6 oz (96 kg)              We Performed the Following     NEUROLOGY ADULT REFERRAL     NEUROLOGY ADULT REFERRAL        Primary Care Provider Office Phone # Fax #    LILIANA Diaz [facility] [phone]       [facility] [address] [place] [address]        Equal Access " to Services     [facility] : Ivet Marshall, wabernardinoda luqadaha, qaybta kaalmareuben scales, meenakshi yee. So [facility] [phone].    ATENCIÓN: Si avelina zamarripa, tiene a lowe disposición servicios gratuitos de asistencia lingüística. Llame al [phone].    We comply with applicable federal civil rights laws and [place] laws. We do not discriminate on the basis of race, color, national origin, age, disability sex, sexual orientation or gender identity.            Thank you!     Thank you for choosing [facility]  for your care. Our goal is always to provide you with excellent care. Hearing back from our patients is one way we can continue to improve our services. Please take a few minutes to complete the written survey that you may receive in the mail after your visit with us. Thank you!             Your Updated Medication List - Protect others around you: Learn how to safely use, store and throw away your medicines at www.disposemymeds.org.          This list is accurate as of: 6/30/17  4:16 PM.  Always use your most recent med list.                   Brand Name Dispense Instructions for use Diagnosis    albuterol 108 (90 BASE) MCG/ACT Inhaler    PROAIR HFA/PROVENTIL HFA/VENTOLIN HFA    1 Inhaler    Inhale 2 puffs into the lungs every 6 hours as needed for shortness of breath / dyspnea or wheezing    Acute bronchitis with symptoms > 10 days       ALPHAGAN P 0.1 % ophthalmic solution   Generic drug:  brimonidine           cetirizine-psuedoePHEDrine 5-120 MG per 12 hr tablet    ZYRTEC-D ALLERGY & CONGESTION    28 tablet    Take 1 tablet by mouth 2 times daily    Chronic frontal sinusitis       COENZYME Q-10 PO           DAILY MULTIVITAMIN PO           GLUCOSAMINE CHONDROITIN ADV PO      Reported on 5/11/2017        IBUPROFEN PO       Muscle cramp       imiquimod 5 % cream    ALDARA    12 packet    Apply a small sized amount to warts or molluscum three times weekly  at bedtime.   Wash off after 8 hours.   May use for up to 16 weeks.    Condyloma, AGW (anogenital warts)       IRON SUPPLEMENT PO       Muscle cramp       levonorgestrel-ethinyl estradiol 0.1-20 MG-MCG per tablet    AVIANE,ALESSE,LESSINA    84 tablet    Take 1 tablet by mouth daily    Encounter for initial prescription of contraceptive pills       MAGNESIUM PO      Take 400 mg by mouth daily        omeprazole 20 MG CR capsule    priLOSEC    60 capsule    Take 1 capsule (20 mg) by mouth daily    Gastroesophageal reflux disease, esophagitis presence not specified       SUMAtriptan 50 MG tablet    IMITREX    18 tablet    Take 1 tablet (50 mg) by mouth at onset of headache for migraine May repeat in 2 hours. Max 8 tablets/24 hours.    Vestibular migraine       vitamin  B-2 25 MG Tabs tablet           VITAMIN B 12 PO           vitamin B complex with vitamin C Tabs tablet      Take 1 tablet by mouth daily    Muscle cramp       vitamin D 1000 UNITS capsule      Take 1 capsule by mouth daily

## 2017-11-10 ENCOUNTER — OFFICE VISIT (OUTPATIENT)
Dept: PEDIATRICS | Facility: CLINIC | Age: 47
End: 2017-11-10
Payer: COMMERCIAL

## 2017-11-10 VITALS
SYSTOLIC BLOOD PRESSURE: 122 MMHG | WEIGHT: 204.1 LBS | DIASTOLIC BLOOD PRESSURE: 74 MMHG | OXYGEN SATURATION: 98 % | HEART RATE: 83 BPM | TEMPERATURE: 97.4 F | BODY MASS INDEX: 32.8 KG/M2 | HEIGHT: 66 IN

## 2017-11-10 DIAGNOSIS — N18.2 CKD (CHRONIC KIDNEY DISEASE) STAGE 2, GFR 60-89 ML/MIN: ICD-10-CM

## 2017-11-10 DIAGNOSIS — R51.9 CHRONIC DAILY HEADACHE: Primary | ICD-10-CM

## 2017-11-10 PROCEDURE — 99213 OFFICE O/P EST LOW 20 MIN: CPT | Performed by: NURSE PRACTITIONER

## 2017-11-10 NOTE — PROGRESS NOTES
"  SUBJECTIVE:   Mira Luis is a 47 year old female who presents to clinic today for the following health issues:    Patient here today to follow up on migraine issue.  Patient states has questions about imaging - wondering about MRI/CT.  Not currently on medications.  States that headaches have reduced in severity and frequency.  Has had some migraine free days - previously 120 days with migraine.  Has started following autoimmune protocol - seems to have helped.  Still having \"pressure\" and was told she has Lyme.:  Declines flu vaccine today.    Migraines have improved but still getting daily headaches. They occasionally wake her from sleep. Still having some vision and imbalance issues. No weight loss.     ROS: const/neuro otherwise negative     OBJECTIVE:  /74 (Cuff Size: Adult Large)  Pulse 83  Temp 97.4  F (36.3  C) (Tympanic)  Ht 5' 6\" (1.676 m)  Wt 204 lb 1.6 oz (92.6 kg)  SpO2 98%  BMI 32.94 kg/m2  CONSTITUTIONAL: Alert, well-nourished, well-groomed, NAD  RESP: Lungs CTA. No wheeze, rhonchi, rales.  CV: HRRR S1 S2 No MRG. No peripheral edema      ASSESSMENT/PLAN:  (R51) Chronic daily headache  (primary encounter diagnosis)  Comment: Ongoing. No improvement. Frequency and severity of headaches is concerning, especially with new onset at her age.   Plan: MIGRAINE ACTION PLAN, TURMERIC PO, [place],         Zingiber officinalis, (MADISON PO), KRILL OIL         PO, MILK THISTLE PO, Probiotic Product         (PROBIOTIC PO), NEUROLOGY ADULT REFERRAL        Referred to neuro for imaging. She agrees to see them asap.     (N18.2) CKD (chronic kidney disease) stage 2, GFR 60-89 ml/min  Comment: FYI in case they need to use petra Valdez, FNP-DNP.        "

## 2017-11-10 NOTE — NURSING NOTE
"Chief Complaint   Patient presents with     Headache     follow up on migraines       Initial /74 (Cuff Size: Adult Large)  Pulse 83  Temp 97.4  F (36.3  C) (Tympanic)  Ht 5' 6\" (1.676 m)  Wt 204 lb 1.6 oz (92.6 kg)  SpO2 98%  BMI 32.94 kg/m2 Estimated body mass index is 32.94 kg/(m^2) as calculated from the following:    Height as of this encounter: 5' 6\" (1.676 m).    Weight as of this encounter: 204 lb 1.6 oz (92.6 kg).  Medication Reconciliation: complete   Pretty Caldera CMA    "

## 2017-11-10 NOTE — MR AVS SNAPSHOT
After Visit Summary   11/10/2017    Mira Luis    MRN: [id number]           Patient Information     Date Of Birth          1970        Visit Information        Provider Department      11/10/2017 10:20 AM Leanne Valdez APRN CNP [facility]        Today's Diagnoses     Chronic daily headache    -  1    CKD (chronic kidney disease) stage 2, GFR 60-89 ml/min          Care Instructions    Please see neurology to discuss MRI    Tell them you have kidney issues.           Follow-ups after your visit        Additional Services     NEUROLOGY ADULT REFERRAL       Your provider has referred you for the following:   Consult at [facility]: [facility] [phone]. Dr. Wei.  [email or web address]    Please be aware that coverage of these services is subject to the terms and limitations of your health insurance plan.  Call member services at your health plan with any benefit or coverage questions.      Please bring the following with you to your appointment:    (1) Any X-Rays, CTs or MRIs which have been performed.  Contact the facility where they were done to arrange for  prior to your scheduled appointment.    (2) List of current medications  (3) This referral request   (4) Any documents/labs given to you for this referral                  Who to contact     If you have questions or need follow up information about today's clinic visit or your schedule please contact [facility] directly at [phone].  Normal or non-critical lab and imaging results will be communicated to you by MyChart, letter or phone within 4 business days after the clinic has received the results. If you do not hear from us within 7 days, please contact the clinic through MyChart or phone. If you have a critical or abnormal lab result, we will notify you by phone as soon as possible.  Submit refill requests through [employer] or call your  "pharmacy and they will forward the refill request to us. Please allow 3 business days for your refill to be completed.          Additional Information About Your Visit        [employer] Information     [facility] gives you secure access to your electronic health record. If you see a primary care provider, you can also send messages to your care team and make appointments. If you have questions, please call your primary care clinic.  If you do not have a primary care provider, please call [phone] and they will assist you.        Care EveryWhere ID     This is your Care EveryWhere ID. This could be used by other organizations to access your [place] medical records  [id number]        Your Vitals Were     Pulse Temperature Height Pulse Oximetry BMI (Body Mass Index)       83 97.4  F (36.3  C) (Tympanic) 5' 6\" (1.676 m) 98% 32.94 kg/m2        Blood Pressure from Last 3 Encounters:   11/10/17 122/74   06/30/17 122/82   05/11/17 130/80    Weight from Last 3 Encounters:   11/10/17 204 lb 1.6 oz (92.6 kg)   06/30/17 210 lb (95.3 kg)   05/11/17 206 lb 14.4 oz (93.8 kg)              We Performed the Following     MIGRAINE ACTION PLAN     NEUROLOGY ADULT REFERRAL        Primary Care Provider Office Phone # Fax #    LILIANA Diaz [phone] [address] [place] [address]        Equal Access to Services     [facility]: Hadii aad ku hadasho Soomaali, waaxda luqadaha, qaybta kaalmada adeegyada, meenakshi quijano . So [facility] [phone].    ATENCIÓN: Si habla español, tiene a lowe disposición servicios gratuitos de asistencia lingüística. Danya al [phone].    We comply with applicable federal civil rights laws and [place] laws. We do not discriminate on the basis of race, color, national origin, age, disability, sex, sexual orientation, or gender identity.            Thank you!     [facility]. Our " goal is always to provide you with excellent care. Hearing back from our patients is one way we can continue to improve our services. Please take a few minutes to complete the written survey that you may receive in the mail after your visit with us. Thank you!             Your Updated Medication List - Protect others around you: Learn how to safely use, store and throw away your medicines at www.disposemymeds.org.          This list is accurate as of: 11/10/17 10:23 AM.  Always use your most recent med list.                   Brand Name Dispense Instructions for use Diagnosis    albuterol 108 (90 BASE) MCG/ACT Inhaler    PROAIR HFA/PROVENTIL HFA/VENTOLIN HFA    1 Inhaler    Inhale 2 puffs into the lungs every 6 hours as needed for shortness of breath / dyspnea or wheezing    Acute bronchitis with symptoms > 10 days       ALPHAGAN P 0.1 % ophthalmic solution   Generic drug:  brimonidine           cetirizine-psuedoePHEDrine 5-120 MG per 12 hr tablet    ZYRTEC-D ALLERGY & CONGESTION    28 tablet    Take 1 tablet by mouth 2 times daily    Chronic frontal sinusitis       COENZYME Q-10 PO           DAILY MULTIVITAMIN PO           GINGER PO       Chronic daily headache       GLUCOSAMINE CHONDROITIN ADV PO      Reported on 5/11/2017        IBUPROFEN PO       Muscle cramp       imiquimod 5 % cream    ALDARA    12 packet    Apply a small sized amount to warts or molluscum three times weekly at bedtime.   Wash off after 8 hours.   May use for up to 16 weeks.    Condyloma, AGW (anogenital warts)       KRILL OIL PO       Chronic daily headache       MAGNESIUM PO      Take 400 mg by mouth daily        MILK THISTLE PO       Chronic daily headache       PROBIOTIC PO       Chronic daily headache       TURMERIC PO       Chronic daily headache       vitamin  B-2 25 MG Tabs tablet           VITAMIN B 12 PO           vitamin B complex with vitamin C Tabs tablet      Take 1 tablet by mouth daily    Muscle cramp       vitamin D 1000 UNITS  capsule      Take 1 capsule by mouth daily

## 2017-12-04 ENCOUNTER — MYC MEDICAL ADVICE (OUTPATIENT)
Dept: PEDIATRICS | Facility: CLINIC | Age: 47
End: 2017-12-04

## 2017-12-04 DIAGNOSIS — A63.0 CONDYLOMA: ICD-10-CM

## 2017-12-04 DIAGNOSIS — A63.0 AGW (ANOGENITAL WARTS): ICD-10-CM

## 2017-12-04 RX ORDER — IMIQUIMOD 12.5 MG/.25G
CREAM TOPICAL
Qty: 12 PACKET | Refills: 6 | Status: SHIPPED | OUTPATIENT
Start: 2017-12-04 | End: 2017-12-06

## 2017-12-04 NOTE — TELEPHONE ENCOUNTER
Leanne would you want to see this prior to prescribing Aldara?  Last office appointment -11/10/17 but these issues were not discussed.  LEAYNE Lyn RN

## 2017-12-06 RX ORDER — IMIQUIMOD 12.5 MG/.25G
CREAM TOPICAL
Qty: 12 PACKET | Refills: 6 | Status: SHIPPED | OUTPATIENT
Start: 2017-12-06 | End: 2021-06-04

## 2018-04-03 PROBLEM — G43.809 VESTIBULAR MIGRAINE: Status: ACTIVE | Noted: 2017-06-30

## 2018-04-03 PROBLEM — G43.809 VESTIBULAR MIGRAINE: Status: ACTIVE | Noted: 2018-04-03

## 2018-04-23 PROBLEM — R51.9 CHRONIC DAILY HEADACHE: Status: ACTIVE | Noted: 2018-04-23

## 2018-05-18 ENCOUNTER — OFFICE VISIT (OUTPATIENT)
Dept: PEDIATRICS | Facility: CLINIC | Age: 48
End: 2018-05-18
Payer: COMMERCIAL

## 2018-05-18 VITALS
TEMPERATURE: 97.6 F | HEIGHT: 66 IN | DIASTOLIC BLOOD PRESSURE: 68 MMHG | HEART RATE: 69 BPM | BODY MASS INDEX: 30.02 KG/M2 | SYSTOLIC BLOOD PRESSURE: 114 MMHG | WEIGHT: 186.8 LBS | OXYGEN SATURATION: 98 %

## 2018-05-18 DIAGNOSIS — N18.2 CKD (CHRONIC KIDNEY DISEASE) STAGE 2, GFR 60-89 ML/MIN: ICD-10-CM

## 2018-05-18 DIAGNOSIS — Z00.00 HEALTH CARE MAINTENANCE: Primary | ICD-10-CM

## 2018-05-18 DIAGNOSIS — R53.83 OTHER FATIGUE: ICD-10-CM

## 2018-05-18 DIAGNOSIS — M25.50 MULTIPLE JOINT PAIN: ICD-10-CM

## 2018-05-18 DIAGNOSIS — E67.3 HIGH VITAMIN D LEVEL: ICD-10-CM

## 2018-05-18 DIAGNOSIS — M54.5 LOW BACK PAIN, UNSPECIFIED BACK PAIN LATERALITY, UNSPECIFIED CHRONICITY, WITH SCIATICA PRESENCE UNSPECIFIED: ICD-10-CM

## 2018-05-18 DIAGNOSIS — R17 ELEVATED BILIRUBIN: ICD-10-CM

## 2018-05-18 LAB
ALBUMIN UR-MCNC: NEGATIVE MG/DL
APPEARANCE UR: CLEAR
BACTERIA #/AREA URNS HPF: ABNORMAL /HPF
BASOPHILS # BLD AUTO: 0 10E9/L (ref 0–0.2)
BASOPHILS NFR BLD AUTO: 0.4 %
BILIRUB UR QL STRIP: NEGATIVE
COLOR UR AUTO: YELLOW
CRP SERPL HS-MCNC: 0.3 MG/L
DEPRECATED CALCIDIOL+CALCIFEROL SERPL-MC: 74 UG/L (ref 20–75)
DIFFERENTIAL METHOD BLD: ABNORMAL
EOSINOPHIL # BLD AUTO: 0.2 10E9/L (ref 0–0.7)
EOSINOPHIL NFR BLD AUTO: 3.9 %
ERYTHROCYTE [DISTWIDTH] IN BLOOD BY AUTOMATED COUNT: 12.5 % (ref 10–15)
ERYTHROCYTE [SEDIMENTATION RATE] IN BLOOD BY WESTERGREN METHOD: 6 MM/H (ref 0–20)
FIBRINOGEN PPP-MCNC: 300 MG/DL (ref 200–420)
GLUCOSE UR STRIP-MCNC: NEGATIVE MG/DL
HBA1C MFR BLD: 4.8 % (ref 0–5.6)
HCT VFR BLD AUTO: 39.9 % (ref 35–47)
HGB BLD-MCNC: 14.3 G/DL (ref 11.7–15.7)
HGB UR QL STRIP: ABNORMAL
KETONES UR STRIP-MCNC: NEGATIVE MG/DL
LEUKOCYTE ESTERASE UR QL STRIP: ABNORMAL
LYMPHOCYTES # BLD AUTO: 2 10E9/L (ref 0.8–5.3)
LYMPHOCYTES NFR BLD AUTO: 43.3 %
MCH RBC QN AUTO: 33.1 PG (ref 26.5–33)
MCHC RBC AUTO-ENTMCNC: 35.8 G/DL (ref 31.5–36.5)
MCV RBC AUTO: 92 FL (ref 78–100)
MONOCYTES # BLD AUTO: 0.4 10E9/L (ref 0–1.3)
MONOCYTES NFR BLD AUTO: 9.1 %
NEUTROPHILS # BLD AUTO: 2 10E9/L (ref 1.6–8.3)
NEUTROPHILS NFR BLD AUTO: 43.3 %
NITRATE UR QL: NEGATIVE
PH UR STRIP: 6.5 PH (ref 5–7)
PLATELET # BLD AUTO: 178 10E9/L (ref 150–450)
RBC # BLD AUTO: 4.32 10E12/L (ref 3.8–5.2)
RBC #/AREA URNS AUTO: ABNORMAL /HPF
SOURCE: ABNORMAL
SP GR UR STRIP: 1.01 (ref 1–1.03)
T3FREE SERPL-MCNC: 3 PG/ML (ref 2.3–4.2)
THYROGLOB AB SERPL IA-ACNC: <20 IU/ML (ref 0–40)
THYROPEROXIDASE AB SERPL-ACNC: <10 IU/ML
UROBILINOGEN UR STRIP-ACNC: 0.2 EU/DL (ref 0.2–1)
WBC # BLD AUTO: 4.6 10E9/L (ref 4–11)
WBC #/AREA URNS AUTO: ABNORMAL /HPF

## 2018-05-18 PROCEDURE — 86200 CCP ANTIBODY: CPT | Performed by: NURSE PRACTITIONER

## 2018-05-18 PROCEDURE — 85652 RBC SED RATE AUTOMATED: CPT | Performed by: NURSE PRACTITIONER

## 2018-05-18 PROCEDURE — 84481 FREE ASSAY (FT-3): CPT | Performed by: NURSE PRACTITIONER

## 2018-05-18 PROCEDURE — 86431 RHEUMATOID FACTOR QUANT: CPT | Performed by: NURSE PRACTITIONER

## 2018-05-18 PROCEDURE — 83735 ASSAY OF MAGNESIUM: CPT | Performed by: NURSE PRACTITIONER

## 2018-05-18 PROCEDURE — 86141 C-REACTIVE PROTEIN HS: CPT | Performed by: NURSE PRACTITIONER

## 2018-05-18 PROCEDURE — 99000 SPECIMEN HANDLING OFFICE-LAB: CPT | Performed by: NURSE PRACTITIONER

## 2018-05-18 PROCEDURE — 99213 OFFICE O/P EST LOW 20 MIN: CPT | Mod: 25 | Performed by: NURSE PRACTITIONER

## 2018-05-18 PROCEDURE — 84445 ASSAY OF TSI GLOBULIN: CPT | Mod: 90 | Performed by: NURSE PRACTITIONER

## 2018-05-18 PROCEDURE — 84443 ASSAY THYROID STIM HORMONE: CPT | Performed by: NURSE PRACTITIONER

## 2018-05-18 PROCEDURE — 99396 PREV VISIT EST AGE 40-64: CPT | Performed by: NURSE PRACTITIONER

## 2018-05-18 PROCEDURE — 82977 ASSAY OF GGT: CPT | Performed by: NURSE PRACTITIONER

## 2018-05-18 PROCEDURE — 85025 COMPLETE CBC W/AUTO DIFF WBC: CPT | Performed by: NURSE PRACTITIONER

## 2018-05-18 PROCEDURE — 86376 MICROSOMAL ANTIBODY EACH: CPT | Performed by: NURSE PRACTITIONER

## 2018-05-18 PROCEDURE — 82306 VITAMIN D 25 HYDROXY: CPT | Performed by: NURSE PRACTITIONER

## 2018-05-18 PROCEDURE — 80061 LIPID PANEL: CPT | Performed by: NURSE PRACTITIONER

## 2018-05-18 PROCEDURE — 83540 ASSAY OF IRON: CPT | Performed by: NURSE PRACTITIONER

## 2018-05-18 PROCEDURE — 84482 T3 REVERSE: CPT | Mod: 90 | Performed by: NURSE PRACTITIONER

## 2018-05-18 PROCEDURE — 81001 URINALYSIS AUTO W/SCOPE: CPT | Performed by: NURSE PRACTITIONER

## 2018-05-18 PROCEDURE — 83036 HEMOGLOBIN GLYCOSYLATED A1C: CPT | Performed by: NURSE PRACTITIONER

## 2018-05-18 PROCEDURE — 83550 IRON BINDING TEST: CPT | Performed by: NURSE PRACTITIONER

## 2018-05-18 PROCEDURE — 36415 COLL VENOUS BLD VENIPUNCTURE: CPT | Performed by: NURSE PRACTITIONER

## 2018-05-18 PROCEDURE — 84479 ASSAY OF THYROID (T3 OR T4): CPT | Mod: 90 | Performed by: NURSE PRACTITIONER

## 2018-05-18 PROCEDURE — 85384 FIBRINOGEN ACTIVITY: CPT | Performed by: NURSE PRACTITIONER

## 2018-05-18 PROCEDURE — 80053 COMPREHEN METABOLIC PANEL: CPT | Performed by: NURSE PRACTITIONER

## 2018-05-18 PROCEDURE — 83090 ASSAY OF HOMOCYSTEINE: CPT | Performed by: NURSE PRACTITIONER

## 2018-05-18 PROCEDURE — 84439 ASSAY OF FREE THYROXINE: CPT | Performed by: NURSE PRACTITIONER

## 2018-05-18 PROCEDURE — 82728 ASSAY OF FERRITIN: CPT | Performed by: NURSE PRACTITIONER

## 2018-05-18 PROCEDURE — 86800 THYROGLOBULIN ANTIBODY: CPT | Performed by: NURSE PRACTITIONER

## 2018-05-18 ASSESSMENT — ENCOUNTER SYMPTOMS
DIZZINESS: 0
HEMATOCHEZIA: 0
HEARTBURN: 0
SHORTNESS OF BREATH: 0
WEAKNESS: 0
DIARRHEA: 0
PALPITATIONS: 0
CONSTIPATION: 0
ABDOMINAL PAIN: 0
EYE PAIN: 0
COUGH: 0
HEMATURIA: 0
MYALGIAS: 0
NERVOUS/ANXIOUS: 0
CHILLS: 1
ARTHRALGIAS: 1
PARESTHESIAS: 0
JOINT SWELLING: 0
SORE THROAT: 0
DYSURIA: 0
HEADACHES: 0
FREQUENCY: 0
NAUSEA: 0
FEVER: 0

## 2018-05-18 NOTE — MR AVS SNAPSHOT
After Visit Summary   5/18/2018    Mira Luis    MRN: [id number]           Patient Information     Date Of Birth          1970        Visit Information        Provider Department      5/18/2018 9:40 AM Leanne Valdez APRN [facility]        Today's Diagnoses     Health care maintenance    -  1    CKD (chronic kidney disease) stage 2, GFR 60-89 ml/min        Multiple joint pain        High vitamin D level        Other fatigue          Care Instructions    Lets do all of these labs and come up with a game plan from there    If no signs of rheumatoid arthritis, we should have you do PT for your back.      Preventive Health Recommendations  Female Ages 40 to 49    Yearly exam:     See your health care provider every year in order to  1. Review health changes.   2. Discuss preventive care.    3. Review your medicines if your doctor prescribed any.      Get a Pap test every three years (unless you have an abnormal result and your provider advises testing more often).      If you get Pap tests with HPV test, you only need to test every 5 years, unless you have an abnormal result. You do not need a Pap test if your uterus was removed (hysterectomy) and you have not had cancer.      You should be tested each year for STDs (sexually transmitted diseases), if you're at risk.       Ask your doctor if you should have a mammogram.      Have a colonoscopy (test for colon cancer) if someone in your family has had colon cancer or polyps before age 50.       Have a cholesterol test every 5 years.       Have a diabetes test (fasting glucose) after age 45. If you are at risk for diabetes, you should have this test every 3 years.    Shots: Get a flu shot each year. Get a tetanus shot every 10 years.     Nutrition:     Eat at least 5 servings of fruits and vegetables each day.    Eat whole-grain bread, whole-wheat pasta and brown rice instead of white grains and rice.    Talk to your provider  "about Calcium and Vitamin D.     Lifestyle    Exercise at least 150 minutes a week (an average of 30 minutes a day, 5 days a week). This will help you control your weight and prevent disease.    Limit alcohol to one drink per day.    No smoking.     Wear sunscreen to prevent skin cancer.    See your dentist every six months for an exam and cleaning.          Follow-ups after your visit        Who to contact     If you have questions or need follow up information about today's clinic visit or your schedule please contact [facility] directly at [phone].  Normal or non-critical lab and imaging results will be communicated to you by [employer], letter or phone within 4 business days after the clinic has received the results. If you do not hear from us within 7 days, please contact the clinic through [employer] or phone. If you have a critical or abnormal lab result, we will notify you by phone as soon as possible.  Submit refill requests through [employer] or call your pharmacy and they will forward the refill request to us. Please allow 3 business days for your refill to be completed.          Additional Information About Your Visit        [employer] Information     [employer] gives you secure access to your electronic health record. If you see a primary care provider, you can also send messages to your care team and make appointments. If you have questions, please call your primary care clinic.  If you do not have a primary care provider, please call [phone] and they will assist you.        Care EveryWhere ID     This is your Care EveryWhere ID. This could be used by other organizations to access your [place] medical records  [id number]        Your Vitals Were     Pulse Temperature Height Last Period Pulse Oximetry BMI (Body Mass Index)    69 97.6  F (36.4  C) (Tympanic) 5' 5.75\" (1.67 m) 05/04/2018 (Exact Date) 98% 30.38 kg/m2       Blood Pressure from Last 3 Encounters:   05/18/18 114/68   11/10/17 122/74 "   06/30/17 122/82    Weight from Last 3 Encounters:   05/18/18 186 lb 12.8 oz (84.7 kg)   11/10/17 204 lb 1.6 oz (92.6 kg)   06/30/17 210 lb (95.3 kg)              We Performed the Following     *UA reflex to Microscopic and Culture ([facility] (except [place] and [place])     Anti thyroglobulin antibody     C-Reactive Protein Hi Sen ([facility])     CBC with platelets differential     Comprehensive metabolic panel     Cyclic Citrullinated Peptide Antibody IgG     ESR: Erythrocyte sedimentation rate     Ferritin     Fibrinogen activity     GGT     Hemoglobin A1c     Homocysteine     Iron and iron binding capacity     Lipid panel reflex to direct LDL Fasting     Magnesium     Rheumatoid factor     T3 Free     T3 reverse     T3 uptake     T4 free     Thyroid peroxidase antibody     Thyroid stimulating immunoglobulin     TSH     Vitamin D Deficiency        Primary Care Provider Office Phone # Fax #    LILIANA Diaz [phone] [address] [place] [address]        Equal Access to Services     [facility]: Hadii aad ku hadasho Sodes, waaxda luqadaha, qaybta kaalmada adeegyada, meenakshi stevenin hayvitaly quijano . So [facility] [phone].    ATENCIÓN: Si habla español, tiene a lowe disposición servicios gratuitos de asistencia lingüística. Llame al [phone].    We comply with applicable federal civil rights laws and [place] laws. We do not discriminate on the basis of race, color, national origin, age, disability, sex, sexual orientation, or gender identity.            Thank you!     Thank you for choosing [facility]  for your care. Our goal is always to provide you with excellent care. Hearing back from our patients is one way we can continue to improve our services. Please take a few minutes to complete the written survey that you may receive in the mail after your visit with us. Thank you!             Your Updated  Medication List - Protect others around you: Learn how to safely use, store and throw away your medicines at www.disposemymeds.org.          This list is accurate as of 5/18/18 10:30 AM.  Always use your most recent med list.                   Brand Name Dispense Instructions for use Diagnosis    albuterol 108 (90 Base) MCG/ACT Inhaler    PROAIR HFA/PROVENTIL HFA/VENTOLIN HFA    1 Inhaler    Inhale 2 puffs into the lungs every 6 hours as needed for shortness of breath / dyspnea or wheezing    Acute bronchitis with symptoms > 10 days       ALPHAGAN P 0.1 % ophthalmic solution   Generic drug:  brimonidine           cetirizine-psuedoePHEDrine 5-120 MG per 12 hr tablet    ZYRTEC-D ALLERGY & CONGESTION    28 tablet    Take 1 tablet by mouth 2 times daily    Chronic frontal sinusitis       COENZYME Q-10 PO           DAILY MULTIVITAMIN PO           GINGER PO       Chronic daily headache       GLUCOSAMINE CHONDROITIN ADV PO      Reported on 5/11/2017        IBUPROFEN PO       Muscle cramp       imiquimod 5 % cream    ALDARA    12 packet    Apply a small sized amount to warts or molluscum three times weekly at bedtime.   Wash off after 8 hours.   May use for up to 16 weeks.    Condyloma, AGW (anogenital warts)       KRILL OIL PO       Chronic daily headache       MAGNESIUM PO      Take 400 mg by mouth daily        MILK THISTLE PO       Chronic daily headache       PROBIOTIC PO       Chronic daily headache       TURMERIC PO       Chronic daily headache       vitamin  B-2 25 MG Tabs tablet           VITAMIN B 12 PO           vitamin B complex with vitamin C Tabs tablet      Take 1 tablet by mouth daily    Muscle cramp       vitamin D 1000 units capsule      Take 1 capsule by mouth daily

## 2018-05-18 NOTE — PROGRESS NOTES
SUBJECTIVE:   CC: Mira Luis is an 47 year old woman who presents for preventive health visit.     Physical   Annual:     Getting at least 3 servings of Calcium per day::  Yes    Bi-annual eye exam::  Yes    Dental care twice a year::  Yes    Sleep apnea or symptoms of sleep apnea::  None    Diet::  Other    Frequency of exercise::  2-3 days/week    Duration of exercise::  15-30 minutes    Taking medications regularly::  Not Applicable    Medication side effects::  Other    Additional concerns today::  YES          Concerns today: wants some labs    Low back pain without radiculopathy    Today's PHQ-2 Score:   PHQ-2 ( 1999 [employer]) 5/18/2018   Q1: Little interest or pleasure in doing things 0   Q2: Feeling down, depressed or hopeless 0   PHQ-2 Score 0   Q1: Little interest or pleasure in doing things Not at all   Q2: Feeling down, depressed or hopeless Not at all   PHQ-2 Score 0     Abuse: Current or Past(Physical, Sexual or Emotional)- No  Do you feel safe in your environment - Yes    Social History   Substance Use Topics     Smoking status: Never Smoker     Smokeless tobacco: Never Used     Alcohol use Yes      Comment: 1 time per week, 2 per time     Alcohol Use 5/18/2018   If you drink alcohol do you typically have greater than 3 drinks per day OR greater than 7 drinks per week? Not Applicable   No flowsheet data found.    Reviewed orders with patient.  Reviewed health maintenance and updated orders accordingly - Yes  Labs reviewed in EPIC    Mammogram not appropriate for this patient based on age.    Pertinent mammograms are reviewed under the imaging tab.  History of abnormal Pap smear: NO - age 30-65 PAP every 5 years with negative HPV co-testing recommended    Reviewed and updated as needed this visit by clinical staff  Tobacco  Allergies  Meds  Med Hx  Surg Hx  Fam Hx  Soc Hx        Reviewed and updated as needed this visit by Provider            Review of Systems   Constitutional: Positive for  "chills. Negative for fever.   HENT: Negative for congestion, ear pain, hearing loss and sore throat.    Eyes: Negative for pain and visual disturbance.   Respiratory: Negative for cough and shortness of breath.    Cardiovascular: Negative for chest pain, palpitations and peripheral edema.   Gastrointestinal: Negative for abdominal pain, constipation, diarrhea, heartburn, hematochezia and nausea.   Genitourinary: Negative for dysuria, frequency, genital sores, hematuria, pelvic pain, urgency, vaginal bleeding and vaginal discharge.   Musculoskeletal: Positive for arthralgias. Negative for joint swelling and myalgias.        Reports LBP for well over 6 months, worse with movement. Doesn't radiate down the legs. \"Clunks\" when she moves. So severe she can't have sex without pain. Really inhibiting her quality of life. Also has lateral hip pain, which has been going on much longer. Hasn't seen PT. Thinks this is all autoimmune.   Skin: Negative for rash.   Neurological: Negative for dizziness, weakness, headaches and paresthesias.   Psychiatric/Behavioral: Negative for mood changes. The patient is not nervous/anxious.           OBJECTIVE:   /68 (Cuff Size: Adult Large)  Pulse 69  Temp 97.6  F (36.4  C) (Tympanic)  Ht 5' 5.75\" (1.67 m)  Wt 186 lb 12.8 oz (84.7 kg)  LMP 05/04/2018 (Exact Date)  SpO2 98%  BMI 30.38 kg/m2  Physical Exam  GENERAL: healthy, alert and no distress  EYES: Eyes grossly normal to inspection, PERRL and conjunctivae and sclerae normal  HENT: ear canals and TM's normal, nose and mouth without ulcers or lesions  NECK: no adenopathy, no asymmetry, masses, or scars and thyroid normal to palpation  RESP: lungs clear to auscultation - no rales, rhonchi or wheezes  BREAST: normal without masses, tenderness or nipple discharge and no palpable axillary masses or adenopathy  CV: regular rate and rhythm, normal S1 S2, no S3 or S4, no murmur, click or rub, no peripheral edema and peripheral pulses " strong  ABDOMEN: soft, nontender, no hepatosplenomegaly, no masses and bowel sounds normal  MS: no gross musculoskeletal defects noted, no edema  MSK: No deformity of spine. No TTP midline lumbar spine. No TTP paraspinal muscles. No TTP SI joint. 5/5 strength LEs.  NEURO: +2 DTRs. Negative SLR.   SKIN: no suspicious lesions or rashes  NEURO: Normal strength and tone, mentation intact and speech normal  PSYCH: mentation appears normal, affect normal/bright    ASSESSMENT/PLAN:   1. Health care maintenance  Patient with myriad sx including chronic headache, fatigue, multiple joint aches, etc. Patient is seeing a functional medicine chiropractor, who is   - CBC with platelets differential  - Comprehensive metabolic panel  - GGT  - Iron and iron binding capacity  - TSH  - T3 Free  - T4 free  - T3 uptake  - Thyroid peroxidase antibody  - Thyroid stimulating immunoglobulin  - T3 reverse  - Anti thyroglobulin antibody  - Vitamin D Deficiency  - Homocysteine  - Ferritin  - Magnesium  - Hemoglobin A1c  - Fibrinogen activity  - *UA reflex to Microscopic and Culture ([facility] (except [place] and [place])  - Lipid panel reflex to direct LDL Fasting  - *MA Screening Digital Bilateral; Future  - CRP inflammation    2. CKD (chronic kidney disease) stage 2, GFR 60-89 ml/min  Mild. Discussed risks of taking supplements with unknown safety data.   - Comprehensive metabolic panel    3. Multiple joint pain  Patient reports many years of multiple joint pain in hands, feet, and hips. This pain is not worse in the morning and associated with subjective swelling. No RA family history. Discussed that her pain is not likely rheumatologic, but patient desiring workup.   - Cyclic Citrullinated Peptide Antibody IgG  - Rheumatoid factor  - ESR: Erythrocyte sedimentation rate  -Recommended PT for the hips.    4. High vitamin D level  History of high vitamin D level. Needs recheck.   - Vitamin D Deficiency    5. Other  "fatigue  Patient with chronic fatigue. Working with a naturopath.       (M54.5) Low back pain, unspecified back pain laterality, unspecified chronicity, with sciatica presence unspecified  Comment: Patient believes her LBP is rheumatologic. However, she has no features of rheumatologic issues. I think her low back and hip pain are likely mechanical back pain. She has never formally tried PT.   Plan:   -She agrees to see PT if all rheumatologic labs are normal.          COUNSELING:  Reviewed preventive health counseling, as reflected in patient instructions         reports that she has never smoked. She has never used smokeless tobacco.    Estimated body mass index is 30.38 kg/(m^2) as calculated from the following:    Height as of this encounter: 5' 5.75\" (1.67 m).    Weight as of this encounter: 186 lb 12.8 oz (84.7 kg).   Weight management plan: Discussed healthy diet and exercise guidelines and patient will follow up in 12 months in clinic to re-evaluate.    Counseling Resources:  ATP IV Guidelines  Pooled Cohorts Equation Calculator  Breast Cancer Risk Calculator  FRAX Risk Assessment  ICSI Preventive Guidelines  Dietary Guidelines for Americans, 2010  USDA's MyPlate  ASA Prophylaxis  Lung CA Screening    Leanne Valdez, LILIANA POP  [facility]    "

## 2018-05-18 NOTE — PATIENT INSTRUCTIONS
Lets do all of these labs and come up with a game plan from there    If no signs of rheumatoid arthritis, we should have you do PT for your back.      Preventive Health Recommendations  Female Ages 40 to 49    Yearly exam:     See your health care provider every year in order to  1. Review health changes.   2. Discuss preventive care.    3. Review your medicines if your doctor prescribed any.      Get a Pap test every three years (unless you have an abnormal result and your provider advises testing more often).      If you get Pap tests with HPV test, you only need to test every 5 years, unless you have an abnormal result. You do not need a Pap test if your uterus was removed (hysterectomy) and you have not had cancer.      You should be tested each year for STDs (sexually transmitted diseases), if you're at risk.       Ask your doctor if you should have a mammogram.      Have a colonoscopy (test for colon cancer) if someone in your family has had colon cancer or polyps before age 50.       Have a cholesterol test every 5 years.       Have a diabetes test (fasting glucose) after age 45. If you are at risk for diabetes, you should have this test every 3 years.    Shots: Get a flu shot each year. Get a tetanus shot every 10 years.     Nutrition:     Eat at least 5 servings of fruits and vegetables each day.    Eat whole-grain bread, whole-wheat pasta and brown rice instead of white grains and rice.    Talk to your provider about Calcium and Vitamin D.     Lifestyle    Exercise at least 150 minutes a week (an average of 30 minutes a day, 5 days a week). This will help you control your weight and prevent disease.    Limit alcohol to one drink per day.    No smoking.     Wear sunscreen to prevent skin cancer.    See your dentist every six months for an exam and cleaning.

## 2018-05-19 LAB
ALBUMIN SERPL-MCNC: 4.1 G/DL (ref 3.4–5)
ALP SERPL-CCNC: 50 U/L (ref 40–150)
ALT SERPL W P-5'-P-CCNC: 30 U/L (ref 0–50)
ANION GAP SERPL CALCULATED.3IONS-SCNC: 10 MMOL/L (ref 3–14)
AST SERPL W P-5'-P-CCNC: 22 U/L (ref 0–45)
BILIRUB SERPL-MCNC: 1.9 MG/DL (ref 0.2–1.3)
BUN SERPL-MCNC: 15 MG/DL (ref 7–30)
CALCIUM SERPL-MCNC: 9.1 MG/DL (ref 8.5–10.1)
CHLORIDE SERPL-SCNC: 106 MMOL/L (ref 94–109)
CHOLEST SERPL-MCNC: 204 MG/DL
CO2 SERPL-SCNC: 22 MMOL/L (ref 20–32)
CREAT SERPL-MCNC: 0.97 MG/DL (ref 0.52–1.04)
FERRITIN SERPL-MCNC: 149 NG/ML (ref 8–252)
GFR SERPL CREATININE-BSD FRML MDRD: 61 ML/MIN/1.7M2
GGT SERPL-CCNC: 14 U/L (ref 0–40)
GLUCOSE SERPL-MCNC: 102 MG/DL (ref 70–99)
HDLC SERPL-MCNC: 56 MG/DL
IRON SATN MFR SERPL: 46 % (ref 15–46)
IRON SERPL-MCNC: 122 UG/DL (ref 35–180)
LDLC SERPL CALC-MCNC: 128 MG/DL
MAGNESIUM SERPL-MCNC: 2.2 MG/DL (ref 1.6–2.3)
NONHDLC SERPL-MCNC: 148 MG/DL
POTASSIUM SERPL-SCNC: 3.6 MMOL/L (ref 3.4–5.3)
PROT SERPL-MCNC: 7 G/DL (ref 6.8–8.8)
SODIUM SERPL-SCNC: 138 MMOL/L (ref 133–144)
T3RU NFR SERPL: 35 % (ref 28–41)
T4 FREE SERPL-MCNC: 1.05 NG/DL (ref 0.76–1.46)
TIBC SERPL-MCNC: 268 UG/DL (ref 240–430)
TRIGL SERPL-MCNC: 100 MG/DL
TSH SERPL DL<=0.005 MIU/L-ACNC: 1.52 MU/L (ref 0.4–4)

## 2018-05-21 LAB
RHEUMATOID FACT SER NEPH-ACNC: <20 IU/ML (ref 0–20)
T3REVERSE SERPL-MCNC: 13 NG/DL (ref 9–27)

## 2018-05-23 LAB
HCYS SERPL-SCNC: 8.3 UMOL/L (ref 4–12)
TSI SER-ACNC: <1 TSI INDEX

## 2018-05-24 LAB — CCP AB SER IA-ACNC: 1 U/ML

## 2018-08-03 ENCOUNTER — OFFICE VISIT (OUTPATIENT)
Dept: PEDIATRICS | Facility: CLINIC | Age: 48
End: 2018-08-03
Payer: COMMERCIAL

## 2018-08-03 VITALS
BODY MASS INDEX: 30.52 KG/M2 | OXYGEN SATURATION: 98 % | HEIGHT: 66 IN | HEART RATE: 78 BPM | WEIGHT: 189.9 LBS | TEMPERATURE: 98.1 F | SYSTOLIC BLOOD PRESSURE: 102 MMHG | DIASTOLIC BLOOD PRESSURE: 76 MMHG

## 2018-08-03 DIAGNOSIS — J06.9 VIRAL URI: Primary | ICD-10-CM

## 2018-08-03 DIAGNOSIS — R07.0 THROAT PAIN: ICD-10-CM

## 2018-08-03 LAB
DEPRECATED S PYO AG THROAT QL EIA: NORMAL
SPECIMEN SOURCE: NORMAL

## 2018-08-03 PROCEDURE — 87880 STREP A ASSAY W/OPTIC: CPT | Performed by: INTERNAL MEDICINE

## 2018-08-03 PROCEDURE — 99213 OFFICE O/P EST LOW 20 MIN: CPT | Performed by: INTERNAL MEDICINE

## 2018-08-03 PROCEDURE — 87081 CULTURE SCREEN ONLY: CPT | Performed by: INTERNAL MEDICINE

## 2018-08-03 RX ORDER — BIMATOPROST 0.3 MG/ML
1 SOLUTION/ DROPS OPHTHALMIC AT BEDTIME
COMMUNITY
End: 2021-06-04

## 2018-08-03 NOTE — MR AVS SNAPSHOT
After Visit Summary   8/3/2018    Mira Luis    MRN: [id number]           Patient Information     Date Of Birth          1970        Visit Information        Provider Department      8/3/2018 1:50 PM Cristhian Disla MD [facility]        Today's Diagnoses     Throat pain    -  1      Care Instructions    Nice to meet you today - I'm sorry you're not feeling well!    Rapid strep is negative. I think this is a virus that has hit your system.     - rest  - push fluids (okay if you don't want to drink much)  - I would try a decongestant like sudafed for a few days (behind the pharmacy counter) to see if this helps with the pressure in your head  - ibuprofen 200-400 mg every 8 hours for headache  - caffeine can sometimes help with headaches too    Let me know if not even starting to improve in a week - at around 10 days we start to consider bacterial sinusitis.           Follow-ups after your visit        Who to contact     If you have questions or need follow up information about today's clinic visit or your schedule please contact [facility] directly at [phone].  Normal or non-critical lab and imaging results will be communicated to you by Cangradehart, letter or phone within 4 business days after the clinic has received the results. If you do not hear from us within 7 days, please contact the clinic through Snaptivat or phone. If you have a critical or abnormal lab result, we will notify you by phone as soon as possible.  Submit refill requests through Workiva or call your pharmacy and they will forward the refill request to us. Please allow 3 business days for your refill to be completed.          Additional Information About Your Visit        MyChart Information     Workiva gives you secure access to your electronic health record. If you see a primary care provider, you can also send messages to your care team and make appointments. If you have questions, please  "call your primary care clinic.  If you do not have a primary care provider, please call [phone] and they will assist you.        Care EveryWhere ID     This is your Care EveryWhere ID. This could be used by other organizations to access your [facility] medical records  [id number]        Your Vitals Were     Pulse Temperature Height Pulse Oximetry Breastfeeding? BMI (Body Mass Index)    78 98.1  F (36.7  C) (Oral) 5' 5.75\" (1.67 m) 98% No 30.88 kg/m2       Blood Pressure from Last 3 Encounters:   08/03/18 102/76   05/18/18 114/68   11/10/17 122/74    Weight from Last 3 Encounters:   08/03/18 189 lb 14.4 oz (86.1 kg)   05/18/18 186 lb 12.8 oz (84.7 kg)   11/10/17 204 lb 1.6 oz (92.6 kg)              We Performed the Following     Beta strep group A culture     Strep, Rapid Screen        Primary Care Provider Office Phone # Fax #    LILIANA Diaz [facility] [phone] [address] [place] [address]        Equal Access to Services     [facility]: Hadii aad ku hadasho Sothierryali, waaxda luqadaha, qaybta kaalmada adeegyada, meenakshi amado hayandrewn ida quijano . So [facility] [phone].    ATENCIÓN: Si habla español, tiene a lowe disposición servicios gratuitos de asistencia lingüística. [facility] [phone].    We comply with applicable federal civil rights laws and [place] laws. We do not discriminate on the basis of race, color, national origin, age, disability, sex, sexual orientation, or gender identity.            Thank you!     Thank you for choosing [facility]  for your care. Our goal is always to provide you with excellent care. Hearing back from our patients is one way we can continue to improve our services. Please take a few minutes to complete the written survey that you may receive in the mail after your visit with us. Thank you!             Your Updated Medication List - Protect others around you: Learn how to safely use, store and throw away " your medicines at www.disposemymeds.org.          This list is accurate as of 8/3/18  2:19 PM.  Always use your most recent med list.                   Brand Name Dispense Instructions for use Diagnosis    albuterol 108 (90 Base) MCG/ACT Inhaler    PROAIR HFA/PROVENTIL HFA/VENTOLIN HFA    1 Inhaler    Inhale 2 puffs into the lungs every 6 hours as needed for shortness of breath / dyspnea or wheezing    Acute bronchitis with symptoms > 10 days       ALPHAGAN P 0.1 % ophthalmic solution   Generic drug:  brimonidine           bimatoprost 0.03 % ophthalmic solution    LUMIGAN     1 drop At Bedtime        cetirizine-pseudoePHEDrine 5-120 MG per 12 hr tablet    ZYRTEC-D ALLERGY & CONGESTION    28 tablet    Take 1 tablet by mouth 2 times daily    Chronic frontal sinusitis       COENZYME Q-10 PO           DAILY MULTIVITAMIN PO           GINGER PO       Chronic daily headache       GLUCOSAMINE CHONDROITIN ADV PO      Reported on 5/11/2017        IBUPROFEN PO       Muscle cramp       imiquimod 5 % cream    ALDARA    12 packet    Apply a small sized amount to warts or molluscum three times weekly at bedtime.   Wash off after 8 hours.   May use for up to 16 weeks.    Condyloma, AGW (anogenital warts)       KRILL OIL PO       Chronic daily headache       MAGNESIUM PO      Take 400 mg by mouth daily        MILK THISTLE PO       Chronic daily headache       PROBIOTIC PO       Chronic daily headache       TURMERIC PO       Chronic daily headache       vitamin  B-2 25 MG Tabs tablet           VITAMIN B 12 PO           vitamin B complex with vitamin C Tabs tablet      Take 1 tablet by mouth daily    Muscle cramp       vitamin D 1000 units capsule      Take 1 capsule by mouth daily

## 2018-08-03 NOTE — PATIENT INSTRUCTIONS
Nice to meet you today - I'm sorry you're not feeling well!    Rapid strep is negative. I think this is a virus that has hit your system.     - rest  - push fluids (okay if you don't want to drink much)  - I would try a decongestant like sudafed for a few days (behind the pharmacy counter) to see if this helps with the pressure in your head  - ibuprofen 200-400 mg every 8 hours for headache  - caffeine can sometimes help with headaches too    Let me know if not even starting to improve in a week - at around 10 days we start to consider bacterial sinusitis.

## 2018-08-03 NOTE — PROGRESS NOTES
SUBJECTIVE:   Mira Luis is a 48 year old female who presents to clinic today for the following health issues:    RESPIRATORY SYMPTOMS    Duration: a few days    Description  sore throat, facial pain/pressure, ear pain both, headache and sneezing     Severity: moderate    Accompanying signs and symptoms: None    History (predisposing factors):  none    Precipitating or alleviating factors: None    Therapies tried and outcome:  rest and fluids OTC NSAID    Throat is the worst fullness and feels like everything is globby. Painful to swallow. Staying hydrated.    No fevers.     Not too much sinus congestion or runny nose.  Both sides of head are painful. No n/v. No vision changes.    Has just been sleeping a lot.  Lots of sneezing.  Went to the dentist on Wednesday but no known sick contacts.    Last Friday had burning with urination.   Urination is mostly normal now.    Has an autoimmune disease - chronic lyme. Worries this flares.    Problem list and histories reviewed & adjusted, as indicated.  Additional history: as documented    Patient Active Problem List   Diagnosis     Uterine fibroid     LSIL (low grade squamous intraepithelial lesion) on Pap smear     AGW (anogenital warts)     Wound infection after surgery     Condyloma     Dysuria     Fatigue     Obesity (BMI 30-39.9)     Palpitations     High vitamin D level     Vestibular migraine     Chronic daily headache     Past Surgical History:   Procedure Laterality Date     EXCISE VULVA WIDE LOCAL  1/16/2013    Procedure: EXCISE VULVA WIDE LOCAL;  Vaginal wide local excision of the vulva.   Diagnosis: vulva chondyloma.;  Surgeon: Pretty Collado MD;  Location:  OR     SURGICAL HISTORY OF -   2/2008    Fib[place] Fx       Social History   Substance Use Topics     Smoking status: Never Smoker     Smokeless tobacco: Never Used     Alcohol use Yes      Comment: 1 time per week, 2 per time     Family History   Problem Relation Age of Onset     Ulcerative Colitis  Mother      Arrhythmia Mother      Hypertension Maternal Grandmother      Cancer Maternal Aunt      Cancer Paternal Aunt      Diabetes No family hx of      Cerebrovascular Disease No family hx of      Thyroid Disease No family hx of      Glaucoma No family hx of      Macular Degeneration No family hx of          Current Outpatient Prescriptions   Medication Sig Dispense Refill     ALPHAGAN P 0.1 % ophthalmic solution   2     bimatoprost (LUMIGAN) 0.03 % ophthalmic solution 1 drop At Bedtime       Cholecalciferol (VITAMIN D) 1000 UNITS capsule Take 1 capsule by mouth daily       COENZYME Q-10 PO        Cyanocobalamin (VITAMIN B 12 PO)        Ginger, Zingiber officinalis, (GINGER PO)        IBUPROFEN PO        KRILL OIL PO        MAGNESIUM PO Take 400 mg by mouth daily        MILK THISTLE PO        Probiotic Product (PROBIOTIC PO)        Riboflavin (VITAMIN  B-2) 25 MG TABS tablet        TURMERIC PO        vitamin  B complex with vitamin C (VITAMIN  B COMPLEX) TABS Take 1 tablet by mouth daily       albuterol (PROAIR HFA/PROVENTIL HFA/VENTOLIN HFA) 108 (90 BASE) MCG/ACT Inhaler Inhale 2 puffs into the lungs every 6 hours as needed for shortness of breath / dyspnea or wheezing (Patient not taking: Reported on 5/11/2017) 1 Inhaler 0     cetirizine-psuedoePHEDrine (ZYRTEC-D ALLERGY & CONGESTION) 5-120 MG per 12 hr tablet Take 1 tablet by mouth 2 times daily (Patient not taking: Reported on 6/30/2017) 28 tablet 0     GLUCOSAMINE CHONDROITIN ADV PO Reported on 5/11/2017       imiquimod (ALDARA) 5 % cream Apply a small sized amount to warts or molluscum three times weekly at bedtime.   Wash off after 8 hours.   May use for up to 16 weeks. (Patient not taking: Reported on 5/18/2018) 12 packet 6     Multiple Vitamin (DAILY MULTIVITAMIN PO)        Allergies   Allergen Reactions     Shellfish Allergy      Throat closed     Sulfa Drugs        Reviewed and updated as needed this visit by clinical staff       Reviewed and updated  "as needed this visit by Provider         ROS:  Constitutional, HEENT, cardiovascular, pulmonary, gi and gu systems are negative, except as otherwise noted.    OBJECTIVE:     /76  Pulse 78  Temp 98.1  F (36.7  C) (Oral)  Ht 5' 5.75\" (1.67 m)  Wt 189 lb 14.4 oz (86.1 kg)  SpO2 98%  Breastfeeding? No  BMI 30.88 kg/m2  Body mass index is 30.88 kg/(m^2).  GENERAL: healthy, alert and no distress  HEAD: bilateral temple tenderness to palpation, mild maxillary sinus tenderness  EYES: Eyes grossly normal to inspection, PERRL and conjunctivae and sclerae normal  HENT: ear canals and TM's normal, nose and mouth without ulcers, mild posterior pharynx erythema, no exudates.  NECK: no adenopathy, no asymmetry, masses, or scars  RESP: lungs clear to auscultation - no rales, rhonchi or wheezes  CV: regular rate and rhythm, normal S1 S2, no S3 or S4, no murmur, click or rub, no peripheral edema and peripheral pulses strong  PSYCH: mentation appears normal, affect normal/bright    Diagnostic Test Results:  Results for orders placed or performed in visit on 08/03/18 (from the past 24 hour(s))   Strep, Rapid Screen   Result Value Ref Range    Specimen Description Throat     Rapid Strep A Screen       NEGATIVE: No Group A streptococcal antigen detected by immunoassay, await culture report.       ASSESSMENT/PLAN:       ICD-10-CM    1. Viral URI J06.9     B97.89    2. Throat pain R07.0 Strep, Rapid Screen     Beta strep group A culture       Patient Instructions   Nice to meet you today - I'm sorry you're not feeling well!    Rapid strep is negative. I think this is a virus that has hit your system.     - rest  - push fluids (okay if you don't want to drink much)  - I would try a decongestant like sudafed for a few days (behind the pharmacy counter) to see if this helps with the pressure in your head  - ibuprofen 200-400 mg every 8 hours for headache  - caffeine can sometimes help with headaches too    Let me know if not even " starting to improve in a week - at around 10 days we start to consider bacterial sinusitis.     Cristhian Disla MD  [facility]

## 2018-08-04 LAB
BACTERIA SPEC CULT: NORMAL
SPECIMEN SOURCE: NORMAL

## 2018-10-02 ENCOUNTER — OFFICE VISIT (OUTPATIENT)
Dept: PEDIATRICS | Facility: CLINIC | Age: 48
End: 2018-10-02
Payer: COMMERCIAL

## 2018-10-02 VITALS
DIASTOLIC BLOOD PRESSURE: 66 MMHG | OXYGEN SATURATION: 97 % | SYSTOLIC BLOOD PRESSURE: 108 MMHG | WEIGHT: 188.2 LBS | BODY MASS INDEX: 30.25 KG/M2 | HEART RATE: 82 BPM | TEMPERATURE: 97.9 F | HEIGHT: 66 IN

## 2018-10-02 DIAGNOSIS — M54.50 BILATERAL LOW BACK PAIN WITHOUT SCIATICA, UNSPECIFIED CHRONICITY: ICD-10-CM

## 2018-10-02 DIAGNOSIS — R17 ELEVATED BILIRUBIN: ICD-10-CM

## 2018-10-02 DIAGNOSIS — M20.5X9 HALLUX LIMITUS, UNSPECIFIED LATERALITY: Primary | ICD-10-CM

## 2018-10-02 PROCEDURE — 36415 COLL VENOUS BLD VENIPUNCTURE: CPT | Performed by: NURSE PRACTITIONER

## 2018-10-02 PROCEDURE — 99214 OFFICE O/P EST MOD 30 MIN: CPT | Performed by: NURSE PRACTITIONER

## 2018-10-02 PROCEDURE — 80076 HEPATIC FUNCTION PANEL: CPT | Performed by: NURSE PRACTITIONER

## 2018-10-02 RX ORDER — ALBUTEROL SULFATE 90 UG/1
2 AEROSOL, METERED RESPIRATORY (INHALATION) EVERY 6 HOURS PRN
Qty: 1 INHALER | Refills: 0 | Status: CANCELLED | OUTPATIENT
Start: 2018-10-02

## 2018-10-02 RX ORDER — IMIQUIMOD 12.5 MG/.25G
CREAM TOPICAL
Qty: 12 PACKET | Refills: 6 | Status: CANCELLED | OUTPATIENT
Start: 2018-10-02

## 2018-10-02 NOTE — PATIENT INSTRUCTIONS
I think you have hallux limitus of the big toe. Comfortable shoes.     Please see PT for your back.

## 2018-10-02 NOTE — MR AVS SNAPSHOT
After Visit Summary   10/2/2018    Mira Luis    MRN: [id number]           Patient Information     Date Of Birth          1970        Visit Information        Provider Department      10/2/2018 3:00 PM Leanne Valdez APRN [facility] [place]        Today's Diagnoses     Hallux limitus, unspecified laterality    -  1    Elevated bilirubin        Bilateral low back pain without sciatica, unspecified chronicity          Care Instructions    I think you have hallux limitus of the big toe. Comfortable shoes.     Please see PT for your back.          Follow-ups after your visit        Additional Services     [facility] PT, HAND, AND CHIROPRACTIC REFERRAL       Physical Therapy, Hand Therapy and Chiropractic Care are available through:  *[facility]  *Hand Therapy (Occupational Therapy or Physical Therapy)  *[facility]    Call one number to schedule at any of the above locations: [phone].    Physical therapy, Hand therapy and/or Chiropractic care has been recommended by your physician as an excellent treatment option to reduce pain and help people return to normal activities, including sports.  Therapy and/or chiropractic care services are a great complement or alternative to expensive and invasive surgery, injections, or long-term use of prescription medications. The primary goal is to identify the underlying problem and provide you the tools to manage your condition on your own.     Please be aware that coverage of these services is subject to the terms and limitations of your health insurance plan.  Call member services at your health plan with any benefit or coverage questions.      Please bring the following to your appointment:  *Your personal calendar for scheduling future appointments  *Comfortable clothing                  Future tests that were ordered for you today     Open Future Orders        Priority Expected Expires Ordered     "[facility] PT, HAND, AND CHIROPRACTIC REFERRAL Routine  10/2/2019 10/2/2018            Who to contact     If you have questions or need follow up information about today's clinic visit or your schedule please contact [facility] directly at [phone].  Normal or non-critical lab and imaging results will be communicated to you by MyChart, letter or phone within 4 business days after the clinic has received the results. If you do not hear from us within 7 days, please contact the clinic through Stor Networkshart or phone. If you have a critical or abnormal lab result, we will notify you by phone as soon as possible.  Submit refill requests through [employer] Associates or call your pharmacy and they will forward the refill request to us. Please allow 3 business days for your refill to be completed.          Additional Information About Your Visit        Stor Networkshart Information     [employer] gives you secure access to your electronic health record. If you see a primary care provider, you can also send messages to your care team and make appointments. If you have questions, please call your primary care clinic.  If you do not have a primary care provider, please call [phone] and they will assist you.        Care EveryWhere ID     This is your Care EveryWhere ID. This could be used by other organizations to access your [facility] medical records  [id number]        Your Vitals Were     Pulse Temperature Height Pulse Oximetry BMI (Body Mass Index)       82 97.9  F (36.6  C) (Tympanic) 5' 5.75\" (1.67 m) 97% 30.61 kg/m2        Blood Pressure from Last 3 Encounters:   10/02/18 108/66   08/03/18 102/76   05/18/18 114/68    Weight from Last 3 Encounters:   10/02/18 188 lb 3.2 oz (85.4 kg)   08/03/18 189 lb 14.4 oz (86.1 kg)   05/18/18 186 lb 12.8 oz (84.7 kg)              We Performed the Following     Hepatic panel (Albumin, ALT, AST, Bili, Alk Phos, TP)        Primary Care Provider Office Phone # Fax #    LILIANA Diaz CNP " [phone] [address] [place] [address]        Equal Access to Services     [facility] : Hadii noam cabrera hadandreao Sodes, waaxda luqadaha, qaybta kaalmada yordy, meenakshi maurizioin hayaaethel arrietanissa zhou laCarinvitaly yee. So [facility] [phone].    ATENCIÓN: Si habla español, tiene a lowe disposición servicios gratuitos de asistencia lingüística. [facility] [phone].    We comply with applicable federal civil rights laws and [place] laws. We do not discriminate on the basis of race, color, national origin, age, disability, sex, sexual orientation, or gender identity.            Thank you!     Thank you for choosing [facility]  for your care. Our goal is always to provide you with excellent care. Hearing back from our patients is one way we can continue to improve our services. Please take a few minutes to complete the written survey that you may receive in the mail after your visit with us. Thank you!             Your Updated Medication List - Protect others around you: Learn how to safely use, store and throw away your medicines at www.disposemymeds.org.          This list is accurate as of 10/2/18  3:59 PM.  Always use your most recent med list.                   Brand Name Dispense Instructions for use Diagnosis    albuterol 108 (90 Base) MCG/ACT inhaler    PROAIR HFA/PROVENTIL HFA/VENTOLIN HFA    1 Inhaler    Inhale 2 puffs into the lungs every 6 hours as needed for shortness of breath / dyspnea or wheezing    Acute bronchitis with symptoms > 10 days       ALPHAGAN P 0.1 % ophthalmic solution   Generic drug:  brimonidine           bimatoprost 0.03 % ophthalmic solution    LUMIGAN     1 drop At Bedtime        cetirizine-pseudoePHEDrine 5-120 MG per 12 hr tablet    ZYRTEC-D ALLERGY & CONGESTION    28 tablet    Take 1 tablet by mouth 2 times daily    Chronic frontal sinusitis       COENZYME Q-10 PO           DAILY MULTIVITAMIN PO           GINGER PO       Chronic daily headache        GLUCOSAMINE CHONDROITIN ADV PO      Reported on 5/11/2017        IBUPROFEN PO       Muscle cramp       imiquimod 5 % cream    ALDARA    12 packet    Apply a small sized amount to warts or molluscum three times weekly at bedtime.   Wash off after 8 hours.   May use for up to 16 weeks.    Condyloma, AGW (anogenital warts)       KRILL OIL PO       Chronic daily headache       MAGNESIUM PO      Take 400 mg by mouth daily        MILK THISTLE PO       Chronic daily headache       PROBIOTIC PO       Chronic daily headache       TURMERIC PO       Chronic daily headache       vitamin  B-2 25 MG Tabs tablet           VITAMIN B 12 PO           vitamin B complex with vitamin C Tabs tablet      Take 1 tablet by mouth daily    Muscle cramp       vitamin D 1000 units capsule      Take 1 capsule by mouth daily

## 2018-10-02 NOTE — PROGRESS NOTES
"  SUBJECTIVE:   Mira Luis is a 48 year old female who presents to clinic today for the following health issues:    Patient here today for Follow-up on these items:  - bilirubin & cholesterol labs from May  - breast tenderness (I won't be seen for mammo until discussing this with primary care)  - low-spine issue  Patient declines flu vaccine.    Breast tenderness      Duration: 1 week - resolved now but concerned    Description (location/character/radiation): left breast tenderness    Intensity:  mild    Accompanying signs and symptoms: none    History (similar episodes/previous evaluation): None    Precipitating or alleviating factors: None    Therapies tried and outcome: None     Back Pain       Duration: 1 year - ongoing        Specific cause: none    Description:   Location of pain: low back both  Character of pain: stiffness and constant  Pain radiation:none  New numbness or weakness in legs, not attributed to pain:  no     Intensity:  moderate    History:   Pain interferes with job: YES, and interfering with sex life  History of back problems: previous history  Any previous MRI or X-rays: None  Sees a specialist for back pain:  No  Therapies tried without relief: activity, chiropractor, massage, NSAIDs, rest and stretch    Alleviating factors:   Improved by: none      Precipitating factors:  Worsened by: Nothing    ROS: const/msk/gi/neuro otherwise negative     OBJECTIVE:  /66 (BP Location: Right arm, Cuff Size: Adult Large)  Pulse 82  Temp 97.9  F (36.6  C) (Tympanic)  Ht 5' 5.75\" (1.67 m)  Wt 188 lb 3.2 oz (85.4 kg)  SpO2 97%  BMI 30.61 kg/m2  CONSTITUTIONAL: Alert, well-nourished, well-groomed, NAD  RESP: Lungs CTA. No wheeze, rhonchi, rales.  CV: HRRR S1 S2 No MRG. No peripheral edema  MSK: No deformity of spine. No TTP midline lumbar spine. No TTP paraspinal muscles. No TTP SI joint. 5/5 strength LEs.  Left great toe with extension only to 10 degrees. Joint slightly larger than " corresponding joint on right foot.   NEURO: +2 DTRs. Negative SLR.       ASSESSMENT/PLAN:  (M20.5X9) Hallux limitus, unspecified laterality  (primary encounter diagnosis)  Comment: Discussed pathophys.   Plan:   -Declines XR  -Recommended comfortable shoes.     (R17) Elevated bilirubin  Comment: Unclear etiology.  Plan:   -Recommended avoiding supplements as they have unknown efficacy  -Recheck bili as well as other liver labs today.     (M54.5) Bilateral low back pain without sciatica, unspecified chronicity  Comment: Ongoing. See previous visit notes. She is ready to start PT. Appears to be muscular in origin.   Plan: [facility] PT, HAND, AND CHIROPRACTIC REFERRAL        If no improvement in 6 weeks will MRI and refer to ortho.       Leanne Valdez, ERVINP-DNP.

## 2018-10-03 LAB
ALBUMIN SERPL-MCNC: 4.1 G/DL (ref 3.4–5)
ALP SERPL-CCNC: 56 U/L (ref 40–150)
ALT SERPL W P-5'-P-CCNC: 33 U/L (ref 0–50)
AST SERPL W P-5'-P-CCNC: 28 U/L (ref 0–45)
BILIRUB DIRECT SERPL-MCNC: 0.4 MG/DL (ref 0–0.2)
BILIRUB SERPL-MCNC: 2.1 MG/DL (ref 0.2–1.3)
PROT SERPL-MCNC: 7.5 G/DL (ref 6.8–8.8)

## 2018-10-12 ENCOUNTER — HOSPITAL ENCOUNTER (OUTPATIENT)
Dept: MAMMOGRAPHY | Facility: CLINIC | Age: 48
Discharge: HOME | End: 2018-10-12
Attending: NURSE PRACTITIONER | Admitting: NURSE PRACTITIONER
Payer: COMMERCIAL

## 2018-10-12 DIAGNOSIS — Z00.00 HEALTH CARE MAINTENANCE: ICD-10-CM

## 2018-10-12 PROCEDURE — 77067 SCR MAMMO BI INCL CAD: CPT

## 2019-06-12 ENCOUNTER — TRANSFERRED RECORDS (OUTPATIENT)
Dept: HEALTH INFORMATION MANAGEMENT | Facility: CLINIC | Age: 49
End: 2019-06-12

## 2019-06-12 DIAGNOSIS — R17 ELEVATED BILIRUBIN: ICD-10-CM

## 2019-06-12 DIAGNOSIS — Z00.00 HEALTH CARE MAINTENANCE: ICD-10-CM

## 2019-06-12 LAB
ALBUMIN SERPL-MCNC: 3.8 G/DL (ref 3.4–5)
ALBUMIN UR-MCNC: NEGATIVE MG/DL
ALP SERPL-CCNC: 50 U/L (ref 40–150)
ALT SERPL W P-5'-P-CCNC: 29 U/L (ref 0–50)
ANION GAP SERPL CALCULATED.3IONS-SCNC: 8 MMOL/L (ref 3–14)
APPEARANCE UR: CLEAR
AST SERPL W P-5'-P-CCNC: 23 U/L (ref 0–45)
BACTERIA #/AREA URNS HPF: ABNORMAL /HPF
BILIRUB DIRECT SERPL-MCNC: 0.3 MG/DL (ref 0–0.2)
BILIRUB SERPL-MCNC: 1.7 MG/DL (ref 0.2–1.3)
BILIRUB UR QL STRIP: NEGATIVE
BUN SERPL-MCNC: 18 MG/DL (ref 7–30)
CALCIUM SERPL-MCNC: 8.6 MG/DL (ref 8.5–10.1)
CHLORIDE SERPL-SCNC: 108 MMOL/L (ref 94–109)
CHOLEST SERPL-MCNC: 223 MG/DL
CO2 SERPL-SCNC: 22 MMOL/L (ref 20–32)
COLOR UR AUTO: YELLOW
COPATH REPORT: NORMAL
CREAT SERPL-MCNC: 1 MG/DL (ref 0.52–1.04)
CRP SERPL-MCNC: <2.9 MG/L (ref 0–8)
DEPRECATED CALCIDIOL+CALCIFEROL SERPL-MC: 56 UG/L (ref 20–75)
DIFFERENTIAL METHOD BLD: NORMAL
EOSINOPHIL # BLD AUTO: 0.1 10E9/L (ref 0–0.7)
EOSINOPHIL NFR BLD AUTO: 3 %
ERYTHROCYTE [DISTWIDTH] IN BLOOD BY AUTOMATED COUNT: 12.4 % (ref 10–15)
FERRITIN SERPL-MCNC: 113 NG/ML (ref 8–252)
FIBRINOGEN PPP-MCNC: 283 MG/DL (ref 200–420)
GFR SERPL CREATININE-BSD FRML MDRD: 67 ML/MIN/{1.73_M2}
GGT SERPL-CCNC: 20 U/L (ref 0–40)
GLUCOSE SERPL-MCNC: 104 MG/DL (ref 70–99)
GLUCOSE UR STRIP-MCNC: NEGATIVE MG/DL
HBA1C MFR BLD: 4.7 % (ref 0–5.6)
HCT VFR BLD AUTO: 41.2 % (ref 35–47)
HDLC SERPL-MCNC: 71 MG/DL
HGB BLD-MCNC: 14.9 G/DL (ref 11.7–15.7)
HGB UR QL STRIP: ABNORMAL
IRON SATN MFR SERPL: 37 % (ref 15–46)
IRON SERPL-MCNC: 94 UG/DL (ref 35–180)
KETONES UR STRIP-MCNC: NEGATIVE MG/DL
LDH SERPL L TO P-CCNC: 184 U/L (ref 81–234)
LDLC SERPL CALC-MCNC: 138 MG/DL
LEUKOCYTE ESTERASE UR QL STRIP: NEGATIVE
LYMPHOCYTES # BLD AUTO: 1.5 10E9/L (ref 0.8–5.3)
LYMPHOCYTES NFR BLD AUTO: 34 %
MAGNESIUM SERPL-MCNC: 2.2 MG/DL (ref 1.6–2.3)
MCH RBC QN AUTO: 32.7 PG (ref 26.5–33)
MCHC RBC AUTO-ENTMCNC: 36.2 G/DL (ref 31.5–36.5)
MCV RBC AUTO: 90 FL (ref 78–100)
MONOCYTES # BLD AUTO: 0.4 10E9/L (ref 0–1.3)
MONOCYTES NFR BLD AUTO: 10 %
NEUTROPHILS # BLD AUTO: 2.4 10E9/L (ref 1.6–8.3)
NEUTROPHILS NFR BLD AUTO: 53 %
NITRATE UR QL: NEGATIVE
NON-SQ EPI CELLS #/AREA URNS LPF: ABNORMAL /LPF
NONHDLC SERPL-MCNC: 152 MG/DL
PH UR STRIP: 7 PH (ref 5–7)
PLATELET # BLD AUTO: 176 10E9/L (ref 150–450)
PLATELET # BLD EST: NORMAL 10*3/UL
POTASSIUM SERPL-SCNC: 3.7 MMOL/L (ref 3.4–5.3)
PROT SERPL-MCNC: 6.9 G/DL (ref 6.8–8.8)
RBC # BLD AUTO: 4.56 10E12/L (ref 3.8–5.2)
RBC #/AREA URNS AUTO: ABNORMAL /HPF
RBC MORPH BLD: NORMAL
RETICS # AUTO: 111.1 10E9/L (ref 25–95)
RETICS/RBC NFR AUTO: 2.4 % (ref 0.5–2)
SODIUM SERPL-SCNC: 138 MMOL/L (ref 133–144)
SOURCE: ABNORMAL
SP GR UR STRIP: 1.01 (ref 1–1.03)
T3FREE SERPL-MCNC: 2.6 PG/ML (ref 2.3–4.2)
T4 FREE SERPL-MCNC: 0.92 NG/DL (ref 0.76–1.46)
TIBC SERPL-MCNC: 256 UG/DL (ref 240–430)
TRIGL SERPL-MCNC: 70 MG/DL
TSH SERPL DL<=0.005 MIU/L-ACNC: 1.85 MU/L (ref 0.4–4)
UROBILINOGEN UR STRIP-ACNC: 0.2 EU/DL (ref 0.2–1)
WBC # BLD AUTO: 4.4 10E9/L (ref 4–11)
WBC #/AREA URNS AUTO: ABNORMAL /HPF

## 2019-06-12 PROCEDURE — 83615 LACTATE (LD) (LDH) ENZYME: CPT | Performed by: NURSE PRACTITIONER

## 2019-06-12 PROCEDURE — 84445 ASSAY OF TSI GLOBULIN: CPT | Mod: 90 | Performed by: NURSE PRACTITIONER

## 2019-06-12 PROCEDURE — 86140 C-REACTIVE PROTEIN: CPT | Performed by: NURSE PRACTITIONER

## 2019-06-12 PROCEDURE — 85045 AUTOMATED RETICULOCYTE COUNT: CPT | Performed by: NURSE PRACTITIONER

## 2019-06-12 PROCEDURE — 83036 HEMOGLOBIN GLYCOSYLATED A1C: CPT | Performed by: NURSE PRACTITIONER

## 2019-06-12 PROCEDURE — 84479 ASSAY OF THYROID (T3 OR T4): CPT | Mod: 90 | Performed by: NURSE PRACTITIONER

## 2019-06-12 PROCEDURE — 83010 ASSAY OF HAPTOGLOBIN QUANT: CPT | Performed by: NURSE PRACTITIONER

## 2019-06-12 PROCEDURE — 36415 COLL VENOUS BLD VENIPUNCTURE: CPT | Performed by: NURSE PRACTITIONER

## 2019-06-12 PROCEDURE — 84482 T3 REVERSE: CPT | Mod: 90 | Performed by: NURSE PRACTITIONER

## 2019-06-12 PROCEDURE — 80061 LIPID PANEL: CPT | Performed by: NURSE PRACTITIONER

## 2019-06-12 PROCEDURE — 86376 MICROSOMAL ANTIBODY EACH: CPT | Performed by: NURSE PRACTITIONER

## 2019-06-12 PROCEDURE — 82306 VITAMIN D 25 HYDROXY: CPT | Performed by: NURSE PRACTITIONER

## 2019-06-12 PROCEDURE — 84439 ASSAY OF FREE THYROXINE: CPT | Performed by: NURSE PRACTITIONER

## 2019-06-12 PROCEDURE — 80053 COMPREHEN METABOLIC PANEL: CPT | Performed by: NURSE PRACTITIONER

## 2019-06-12 PROCEDURE — 84481 FREE ASSAY (FT-3): CPT | Performed by: NURSE PRACTITIONER

## 2019-06-12 PROCEDURE — 83090 ASSAY OF HOMOCYSTEINE: CPT | Performed by: NURSE PRACTITIONER

## 2019-06-12 PROCEDURE — 83550 IRON BINDING TEST: CPT | Performed by: NURSE PRACTITIONER

## 2019-06-12 PROCEDURE — 99000 SPECIMEN HANDLING OFFICE-LAB: CPT | Performed by: NURSE PRACTITIONER

## 2019-06-12 PROCEDURE — 82248 BILIRUBIN DIRECT: CPT | Performed by: NURSE PRACTITIONER

## 2019-06-12 PROCEDURE — 85025 COMPLETE CBC W/AUTO DIFF WBC: CPT | Performed by: NURSE PRACTITIONER

## 2019-06-12 PROCEDURE — 82977 ASSAY OF GGT: CPT | Performed by: NURSE PRACTITIONER

## 2019-06-12 PROCEDURE — 83540 ASSAY OF IRON: CPT | Performed by: NURSE PRACTITIONER

## 2019-06-12 PROCEDURE — 86800 THYROGLOBULIN ANTIBODY: CPT | Performed by: NURSE PRACTITIONER

## 2019-06-12 PROCEDURE — 81001 URINALYSIS AUTO W/SCOPE: CPT | Performed by: NURSE PRACTITIONER

## 2019-06-12 PROCEDURE — 82728 ASSAY OF FERRITIN: CPT | Performed by: NURSE PRACTITIONER

## 2019-06-12 PROCEDURE — 83735 ASSAY OF MAGNESIUM: CPT | Performed by: NURSE PRACTITIONER

## 2019-06-12 PROCEDURE — 85060 BLOOD SMEAR INTERPRETATION: CPT | Performed by: NURSE PRACTITIONER

## 2019-06-12 PROCEDURE — 85384 FIBRINOGEN ACTIVITY: CPT | Performed by: NURSE PRACTITIONER

## 2019-06-12 PROCEDURE — 84443 ASSAY THYROID STIM HORMONE: CPT | Performed by: NURSE PRACTITIONER

## 2019-06-13 LAB
HAPTOGLOB SERPL-MCNC: 27 MG/DL (ref 15–200)
HCYS SERPL-SCNC: 9.3 UMOL/L (ref 4–12)
THYROGLOB AB SERPL IA-ACNC: <20 IU/ML (ref 0–40)
THYROPEROXIDASE AB SERPL-ACNC: 13 IU/ML

## 2019-06-18 LAB — TSI SER-ACNC: <1 TSI INDEX

## 2019-07-18 LAB — T3REVERSE SERPL-MCNC: 13.3 NG/DL (ref 9–27)

## 2019-07-22 ENCOUNTER — ANCILLARY PROCEDURE (OUTPATIENT)
Dept: GENERAL RADIOLOGY | Facility: CLINIC | Age: 49
End: 2019-07-22
Attending: NURSE PRACTITIONER
Payer: COMMERCIAL

## 2019-07-22 ENCOUNTER — OFFICE VISIT (OUTPATIENT)
Dept: PEDIATRICS | Facility: CLINIC | Age: 49
End: 2019-07-22
Payer: COMMERCIAL

## 2019-07-22 VITALS
OXYGEN SATURATION: 98 % | DIASTOLIC BLOOD PRESSURE: 62 MMHG | TEMPERATURE: 98.6 F | BODY MASS INDEX: 31.39 KG/M2 | HEIGHT: 66 IN | SYSTOLIC BLOOD PRESSURE: 102 MMHG | HEART RATE: 71 BPM | WEIGHT: 195.3 LBS

## 2019-07-22 DIAGNOSIS — Z00.00 ROUTINE GENERAL MEDICAL EXAMINATION AT A HEALTH CARE FACILITY: Primary | ICD-10-CM

## 2019-07-22 DIAGNOSIS — M54.50 BILATERAL LOW BACK PAIN WITHOUT SCIATICA, UNSPECIFIED CHRONICITY: ICD-10-CM

## 2019-07-22 DIAGNOSIS — Z23 NEED FOR VACCINATION: ICD-10-CM

## 2019-07-22 DIAGNOSIS — N18.2 CKD (CHRONIC KIDNEY DISEASE) STAGE 2, GFR 60-89 ML/MIN: ICD-10-CM

## 2019-07-22 DIAGNOSIS — R73.01 IMPAIRED FASTING GLUCOSE: ICD-10-CM

## 2019-07-22 DIAGNOSIS — M79.675 PAIN OF TOE OF LEFT FOOT: ICD-10-CM

## 2019-07-22 DIAGNOSIS — R17 ELEVATED BILIRUBIN: ICD-10-CM

## 2019-07-22 DIAGNOSIS — R70.1 RETICULOCYTOSIS: ICD-10-CM

## 2019-07-22 PROCEDURE — 99396 PREV VISIT EST AGE 40-64: CPT | Performed by: NURSE PRACTITIONER

## 2019-07-22 PROCEDURE — 73630 X-RAY EXAM OF FOOT: CPT | Mod: LT

## 2019-07-22 PROCEDURE — 99213 OFFICE O/P EST LOW 20 MIN: CPT | Mod: 25 | Performed by: NURSE PRACTITIONER

## 2019-07-22 ASSESSMENT — ENCOUNTER SYMPTOMS
NERVOUS/ANXIOUS: 0
PALPITATIONS: 0
CONSTIPATION: 0
SORE THROAT: 0
HEARTBURN: 0
DIARRHEA: 0
HEADACHES: 0
DYSURIA: 0
SHORTNESS OF BREATH: 0
NAUSEA: 0
HEMATOCHEZIA: 0
HEMATURIA: 0
ARTHRALGIAS: 0
WEAKNESS: 0
DIZZINESS: 0
FEVER: 0
JOINT SWELLING: 0
FREQUENCY: 0
ABDOMINAL PAIN: 0
PARESTHESIAS: 0
BREAST MASS: 0
MYALGIAS: 0
COUGH: 0
EYE PAIN: 0
CHILLS: 0

## 2019-07-22 ASSESSMENT — MIFFLIN-ST. JEOR: SCORE: 1531.03

## 2019-07-22 NOTE — PATIENT INSTRUCTIONS
I recommend following up on your reticulocytosis (hematology) and elevated bilirubin (gastroenterology).         Preventive Health Recommendations  Female Ages 40 to 49    Yearly exam:     See your health care provider every year in order to  1. Review health changes.   2. Discuss preventive care.    3. Review your medicines if your doctor prescribed any.      Get a Pap test every three years (unless you have an abnormal result and your provider advises testing more often).      If you get Pap tests with HPV test, you only need to test every 5 years, unless you have an abnormal result. You do not need a Pap test if your uterus was removed (hysterectomy) and you have not had cancer.      You should be tested each year for STDs (sexually transmitted diseases), if you're at risk.     Ask your doctor if you should have a mammogram.      Have a colonoscopy (test for colon cancer) if someone in your family has had colon cancer or polyps before age 50.       Have a cholesterol test every 5 years.       Have a diabetes test (fasting glucose) after age 45. If you are at risk for diabetes, you should have this test every 3 years.    Shots: Get a flu shot each year. Get a tetanus shot every 10 years.     Nutrition:     Eat at least 5 servings of fruits and vegetables each day.    Eat whole-grain bread, whole-wheat pasta and brown rice instead of white grains and rice.    Get adequate Calcium and Vitamin D.      Lifestyle    Exercise at least 150 minutes a week (an average of 30 minutes a day, 5 days a week). This will help you control your weight and prevent disease.    Limit alcohol to one drink per day.    No smoking.     Wear sunscreen to prevent skin cancer.    See your dentist every six months for an exam and cleaning.

## 2019-07-22 NOTE — PROGRESS NOTES
SUBJECTIVE:   CC: Mira Luis is an 49 year old woman who presents for preventive health visit.     Healthy Habits:     Getting at least 3 servings of Calcium per day:  Yes    Bi-annual eye exam:  Yes    Dental care twice a year:  Yes    Sleep apnea or symptoms of sleep apnea:  None    Diet:  Other    Frequency of exercise:  2-3 days/week    Duration of exercise:  15-30 minutes    Taking medications regularly:  Not Applicable    Medication side effects:  Not applicable    PHQ-2 Total Score: 0    Additional concerns today:  No    Lab results discussion  Irregular periods started 1-2 months ago. UPT x 2 negative. No hot flashes, etc.     Total bili 1.7. Indirect 0.3  Reticulocytosis without anemia    -------------------------------------    Today's PHQ-2 Score:   PHQ-2 ( 1999 [employer]) 7/22/2019   Q1: Little interest or pleasure in doing things 0   Q2: Feeling down, depressed or hopeless 0   PHQ-2 Score 0   Q1: Little interest or pleasure in doing things Not at all   Q2: Feeling down, depressed or hopeless Not at all   PHQ-2 Score 0     Abuse: Current or Past(Physical, Sexual or Emotional)- No  Do you feel safe in your environment? Yes    Social History     Tobacco Use     Smoking status: Never Smoker     Smokeless tobacco: Never Used   Substance Use Topics     Alcohol use: Yes     Frequency: Never     Drinks per session: Patient refused     Binge frequency: Never     Comment: 1 time per week, 2 per time     If you drink alcohol do you typically have >3 drinks per day or >7 drinks per week? No    Alcohol Use 7/22/2019   Prescreen: >3 drinks/day or >7 drinks/week? Not Applicable   Prescreen: >3 drinks/day or >7 drinks/week? -   No flowsheet data found.    Reviewed orders with patient.  Reviewed health maintenance and updated orders accordingly - Yes  Lab work is in process    Mammogram Screening: Patient under age 50, mutual decision reflected in health maintenance.      Pertinent mammograms are reviewed under the  "imaging tab.  History of abnormal Pap smear: NO - age 30-65 PAP every 5 years with negative HPV co-testing recommended  PAP / HPV Latest Ref Rng & Units 10/25/2016 11/11/2013 8/14/2013   PAP - NIL ASC-US(A) NIL   HPV 16 DNA NEG Negative - -   HPV 18 DNA NEG Negative - -   OTHER HR HPV NEG Negative - -     Reviewed and updated as needed this visit by clinical staff  Tobacco  Allergies  Med Hx  Surg Hx  Fam Hx  Soc Hx        Reviewed and updated as needed this visit by Provider            Review of Systems   Constitutional: Negative for chills and fever.   HENT: Negative for congestion, ear pain, hearing loss and sore throat.    Eyes: Negative for pain and visual disturbance.   Respiratory: Negative for cough and shortness of breath.    Cardiovascular: Negative for chest pain, palpitations and peripheral edema.   Gastrointestinal: Negative for abdominal pain, constipation, diarrhea, heartburn, hematochezia and nausea.   Breasts:  Positive for tenderness. Negative for breast mass and discharge.   Genitourinary: Negative for dysuria, frequency, genital sores, hematuria, pelvic pain, urgency, vaginal bleeding and vaginal discharge.   Musculoskeletal: Negative for arthralgias, joint swelling and myalgias.   Skin: Negative for rash.   Neurological: Negative for dizziness, weakness, headaches and paresthesias.   Psychiatric/Behavioral: Negative for mood changes. The patient is not nervous/anxious.      Bilateral breast tenderness prior to periods     OBJECTIVE:   /62 (BP Location: Right arm, Cuff Size: Adult Large)   Pulse 71   Temp 98.6  F (37  C) (Tympanic)   Ht 1.674 m (5' 5.9\")   Wt 88.6 kg (195 lb 4.8 oz)   LMP 06/10/2019 (Exact Date)   SpO2 98%   BMI 31.62 kg/m    Physical Exam  GENERAL: healthy, alert and no distress  EYES: Eyes grossly normal to inspection, PERRL and conjunctivae and sclerae normal  HENT: ear canals and TM's normal, nose and mouth without ulcers or lesions  NECK: no adenopathy, no " "asymmetry, masses, or scars and thyroid normal to palpation  RESP: lungs clear to auscultation - no rales, rhonchi or wheezes  BREAST: normal without masses, tenderness or nipple discharge and no palpable axillary masses or adenopathy  CV: regular rate and rhythm, normal S1 S2, no S3 or S4, no murmur, click or rub, no peripheral edema and peripheral pulses strong  ABDOMEN: soft, nontender, no hepatosplenomegaly, no masses and bowel sounds normal  MS: no gross musculoskeletal defects noted, no edema  SKIN: no suspicious lesions or rashes  NEURO: Normal strength and tone, mentation intact and speech normal  PSYCH: mentation appears normal, affect normal/bright    Diagnostic Test Results:  Labs reviewed in Epic    ASSESSMENT/PLAN:   1. Routine general medical examination at a health care facility  - HIV Screening  - OPTOMETRY REFERRAL    2. Impaired fasting glucose  Normal A1c    3. Elevated bilirubin  Unclear etiology. Elevated conjugated and unconjugated bilirubin with normal LFTs. Elevated reticulocytosis but workup for hemolysis was otherwise negative. No GI sx.   - GASTROENTEROLOGY ADULT REF CONSULT ONLY    4. CKD (chronic kidney disease) stage 2, GFR 60-89 ml/min  Stable    5. Reticulocytosis  Unable to find the source of reticulocytosis without anemia with normal haptoglobin.   - ONC/HEME ADULT REFERRAL    6. Need for vaccination  - TDAP VACCINE (ADACEL)  -      ADMIN VACCINE, FIRST    COUNSELING:  Reviewed preventive health counseling, as reflected in patient instructions    Estimated body mass index is 31.62 kg/m  as calculated from the following:    Height as of this encounter: 1.674 m (5' 5.9\").    Weight as of this encounter: 88.6 kg (195 lb 4.8 oz).    Weight management plan: Discussed healthy diet and exercise guidelines     reports that she has never smoked. She has never used smokeless tobacco.      Counseling Resources:  ATP IV Guidelines  Pooled Cohorts Equation Calculator  Breast Cancer Risk " Calculator  FRAX Risk Assessment  ICSI Preventive Guidelines  Dietary Guidelines for Americans, 2010  USDA's MyPlate  ASA Prophylaxis  Lung CA Screening    Leanne Valdez, APRN CNP  [facility]

## 2019-08-14 ENCOUNTER — OFFICE VISIT (OUTPATIENT)
Dept: PODIATRY | Facility: CLINIC | Age: 49
End: 2019-08-14
Payer: COMMERCIAL

## 2019-08-14 VITALS
DIASTOLIC BLOOD PRESSURE: 66 MMHG | SYSTOLIC BLOOD PRESSURE: 106 MMHG | BODY MASS INDEX: 31.39 KG/M2 | WEIGHT: 195.3 LBS | HEIGHT: 66 IN

## 2019-08-14 DIAGNOSIS — M25.572 PAIN IN JOINT OF LEFT FOOT: Primary | ICD-10-CM

## 2019-08-14 DIAGNOSIS — M20.22 HALLUX RIGIDUS OF LEFT FOOT: ICD-10-CM

## 2019-08-14 PROCEDURE — 99243 OFF/OP CNSLTJ NEW/EST LOW 30: CPT | Performed by: PODIATRIST

## 2019-08-14 ASSESSMENT — MIFFLIN-ST. JEOR: SCORE: 1531.03

## 2019-08-14 NOTE — PROGRESS NOTES
"  ASSESSMENT/PLAN:    Encounter Diagnoses   Name Primary?     Pain in joint of left foot Yes     Hallux rigidus of left foot      The cause and natural course of hallux limitus/1st metatarsophalangeal joint degenerative joint disease was discussed.  An informational handout was provided.      Conservative cares were reviewed:  1) Rigid-soled, supportive shoes to externally splint the joint;  2)  Avoidance of barefoot walking   3)  Activity modification  4) antiinflammatory measures such as ice, NSAIDS, and injection therapy.    We also reviewed surgical intervention.  I explained that the goals of surgery are to reduce pain.  Given the condition of her joint, I think the best surgical option is arthrodesis.      Body mass index is 31.62 kg/m .    Weight management plan: Patient was referred to their PCP to discuss a diet and exercise plan.      Arturo Harris DPM, FACWILBERT, MS    [place] Department of Podiatry/Foot & Ankle Surgery      ____________________________________________________________________    HPI:       I was asked by Leanne Valdez CNP to evaluate this patient, in consultation, regarding pain of toe, left foot.     Chief Complaint: pain, restriction of movement in left great toe  Onset of problem: 1 year  Pain/ discomfort is described as:  ache  Ratin-6/10   Frequency:  daily    The pain is made worse with \"spinach, oats, heels\"  Previous treatment: ice, anti inflammatory  She was evaluated by Leanne Valdez CNP, X-ray was done    Patient Active Problem List   Diagnosis     Uterine fibroid     LSIL (low grade squamous intraepithelial lesion) on Pap smear     AGW (anogenital warts)     Fatigue     Obesity (BMI 30-39.9)     High vitamin D level     Vestibular migraine     Chronic daily headache     CKD (chronic kidney disease) stage 2, GFR 60-89 ml/min     Elevated bilirubin     Impaired fasting glucose     Reticulocytosis   *  *  Past Surgical History:   Procedure Laterality Date     EXCISE VULVA WIDE " LOCAL  1/16/2013    Procedure: EXCISE VULVA WIDE LOCAL;  Vaginal wide local excision of the vulva.   Diagnosis: vulva chondyloma.;  Surgeon: Pretty Collado MD;  Location: [facility] OR     SURGICAL HISTORY OF -   2/2008    Fibula Fx   *  *  Current Outpatient Medications   Medication Sig Dispense Refill     albuterol (PROAIR HFA/PROVENTIL HFA/VENTOLIN HFA) 108 (90 BASE) MCG/ACT Inhaler Inhale 2 puffs into the lungs every 6 hours as needed for shortness of breath / dyspnea or wheezing (Patient not taking: Reported on 5/11/2017) 1 Inhaler 0     ALPHAGAN P 0.1 % ophthalmic solution   2     bimatoprost (LUMIGAN) 0.03 % ophthalmic solution 1 drop At Bedtime       cetirizine-psuedoePHEDrine (ZYRTEC-D ALLERGY & CONGESTION) 5-120 MG per 12 hr tablet Take 1 tablet by mouth 2 times daily (Patient not taking: Reported on 6/30/2017) 28 tablet 0     Cholecalciferol (VITAMIN D) 1000 UNITS capsule Take 1 capsule by mouth daily       COENZYME Q-10 PO        Cyanocobalamin (VITAMIN B 12 PO)        Ginger, Zingiber officinalis, (GINGER PO)        GLUCOSAMINE CHONDROITIN ADV PO Reported on 5/11/2017       IBUPROFEN PO        imiquimod (ALDARA) 5 % cream Apply a small sized amount to warts or molluscum three times weekly at bedtime.   Wash off after 8 hours.   May use for up to 16 weeks. (Patient not taking: Reported on 5/18/2018) 12 packet 6     KRILL OIL PO        MAGNESIUM PO Take 400 mg by mouth daily        MILK THISTLE PO        Multiple Vitamin (DAILY MULTIVITAMIN PO)        Probiotic Product (PROBIOTIC PO)        Riboflavin (VITAMIN  B-2) 25 MG TABS tablet        TURMERIC PO        vitamin  B complex with vitamin C (VITAMIN  B COMPLEX) TABS Take 1 tablet by mouth daily         ROS:     A 10-point review of systems was performed and is positive for that noted above in the HPI and as seen below.  All other areas are negative.     Numbness in feet?  no   Calf pain with walking? no  Recent foot/ankle injury? no  Weight change over  past 12 months? 20# loss  Self perception as overweight? yes  Recent flu-like symptoms? no  Joint pain other than feet ? no    Social History: Employment:  Desk job;  Exercise/Physical activity:  Moderate walk a few times/ week;  Tobacco use:  no  Social History     Socioeconomic History     Marital status:      Spouse name: Not on file     Number of children: Not on file     Years of education: Not on file     Highest education level: Bachelor's degree (e.g., BA, AB, BS)   Occupational History     Not on file   Social Needs     Financial resource strain: Not on file     Food insecurity:     Worry: Not on file     Inability: Not on file     Transportation needs:     Medical: Not on file     Non-medical: Not on file   Tobacco Use     Smoking status: Never Smoker     Smokeless tobacco: Never Used   Substance and Sexual Activity     Alcohol use: Yes     Frequency: Never     Drinks per session: Patient refused     Binge frequency: Never     Comment: 1 time per week, 2 per time     Drug use: No     Sexual activity: Yes     Partners: Male     Birth control/protection: Pill     Comment: Aviane   Lifestyle     Physical activity:     Days per week: 1 day     Minutes per session: 20 min     Stress: Not at all   Relationships     Social connections:     Talks on phone: Patient refused     Gets together: Twice a week     Attends Anabaptism service: Patient refused     Active member of club or organization: Yes     Attends meetings of clubs or organizations: More than 4 times per year     Relationship status:      Intimate partner violence:     Fear of current or ex partner: Not on file     Emotionally abused: Not on file     Physically abused: Not on file     Forced sexual activity: Not on file   Other Topics Concern     Parent/sibling w/ CABG, MI or angioplasty before 65F 55M? Not Asked      Service No     Blood Transfusions No     Caffeine Concern No     Occupational Exposure No     Hobby Hazards No      "Sleep Concern No     Stress Concern No     Weight Concern No     Special Diet No     Back Care No     Exercise Yes     Bike Helmet No     Seat Belt Yes     Self-Exams Yes   Social History Narrative     Not on file       Family history:  Family History   Problem Relation Age of Onset     Ulcerative Colitis Mother      Arrhythmia Mother      Hypertension Maternal Grandmother      Cancer Maternal Aunt      Cancer Paternal Aunt      Diabetes No family hx of      Cerebrovascular Disease No family hx of      Thyroid Disease No family hx of      Glaucoma No family hx of      Macular Degeneration No family hx of        Rheumatoid arthritis:  no  Foot Problems: parent - bunion  Diabetes: no      EXAM:    Vitals: /66   Ht 1.674 m (5' 5.9\")   Wt 88.6 kg (195 lb 4.8 oz)   BMI 31.62 kg/m    BMI: Body mass index is 31.62 kg/m .  Height: 5' 5.9\"    Constitutional/ general:  Pt is in no apparent distress, appears well-nourished.  Cooperative with history and physical exam.     Vascular:  Pedal pulses are palpable bilaterally for both the DP and PT arteries.  CFT < 3 sec.  No edema.  Pedal hair growth noted.     Neuro:  Alert and oriented x 3. Coordinated gait.  Light touch sensation is intact to the L4, L5, S1 distributions. No obvious deficits.  No evidence of neurological-based weakness, spasticity, or contracture in the lower extremities.     Derm: Normal texture and turgor.  No erythema, ecchymosis, or cyanosis.  No open lesions.     Musculoskeletal:    Lower extremity muscle strength is normal.  Patient is ambulatory without an assistive device or brace .  No gross deformities.  Dorsal medial eminence, left 1st metatarsal head. Abnormal limitation of left hallux dorsiflexion with, and without, loading of the forefoot.        Radiographic Exam:  X-Ray Findings:  I personally reviewed the 7/22/19 films.  Sclerosis around the left 1st metatarsophalangeal joint.  Severe joint space narrowing.  Roxy-articular spurring. "       Arturo Harris DPM, FACFAS, MS    [place] Department of Podiatry/Foot & Ankle Surgery

## 2019-08-14 NOTE — LETTER
"    2019         RE: Mira Luis  [address] [place] [address]        Dear Colleague,    Thank you for referring your patient, Mira Luis, to the [facility]. Please see a copy of my visit note below.      ASSESSMENT/PLAN:    Encounter Diagnoses   Name Primary?     Pain in joint of left foot Yes     Hallux rigidus of left foot      The cause and natural course of hallux limitus/1st metatarsophalangeal joint degenerative joint disease was discussed.  An informational handout was provided.      Conservative cares were reviewed:  1) Rigid-soled, supportive shoes to externally splint the joint;  2)  Avoidance of barefoot walking   3)  Activity modification  4) antiinflammatory measures such as ice, NSAIDS, and injection therapy.    We also reviewed surgical intervention.  I explained that the goals of surgery are to reduce pain.  Given the condition of her joint, I think the best surgical option is arthrodesis.      Body mass index is 31.62 kg/m .    Weight management plan: Patient was referred to their PCP to discuss a diet and exercise plan.      Arturo Harris DPM, FACFAS, MS    [place] Department of Podiatry/Foot & Ankle Surgery      ____________________________________________________________________    HPI:       I was asked by Leanne Valdez CNP to evaluate this patient, in consultation, regarding pain of toe, left foot.     Chief Complaint: pain, restriction of movement in left great toe  Onset of problem: 1 year  Pain/ discomfort is described as:  ache  Ratin-6/10   Frequency:  daily    The pain is made worse with \"spinach, oats, heels\"  Previous treatment: ice, anti inflammatory  She was evaluated by Leanne Valdez CNP, X-ray was done    Patient Active Problem List   Diagnosis     Uterine fibroid     LSIL (low grade squamous intraepithelial lesion) on Pap smear     AGW (anogenital warts)     Fatigue     Obesity (BMI 30-39.9)     High vitamin D level     Vestibular migraine "     Chronic daily headache     CKD (chronic kidney disease) stage 2, GFR 60-89 ml/min     Elevated bilirubin     Impaired fasting glucose     Reticulocytosis   *  *  Past Surgical History:   Procedure Laterality Date     EXCISE VULVA WIDE LOCAL  1/16/2013    Procedure: EXCISE VULVA WIDE LOCAL;  Vaginal wide local excision of the vulva.   Diagnosis: vulva chondyloma.;  Surgeon: Pretty Collado MD;  Location: [facility] OR     SURGICAL HISTORY OF -   2/2008    Fibula Fx   *  *  Current Outpatient Medications   Medication Sig Dispense Refill     albuterol (PROAIR HFA/PROVENTIL HFA/VENTOLIN HFA) 108 (90 BASE) MCG/ACT Inhaler Inhale 2 puffs into the lungs every 6 hours as needed for shortness of breath / dyspnea or wheezing (Patient not taking: Reported on 5/11/2017) 1 Inhaler 0     ALPHAGAN P 0.1 % ophthalmic solution   2     bimatoprost (LUMIGAN) 0.03 % ophthalmic solution 1 drop At Bedtime       cetirizine-psuedoePHEDrine (ZYRTEC-D ALLERGY & CONGESTION) 5-120 MG per 12 hr tablet Take 1 tablet by mouth 2 times daily (Patient not taking: Reported on 6/30/2017) 28 tablet 0     Cholecalciferol (VITAMIN D) 1000 UNITS capsule Take 1 capsule by mouth daily       COENZYME Q-10 PO        Cyanocobalamin (VITAMIN B 12 PO)        Ginger, Zingiber officinalis, (GINGER PO)        GLUCOSAMINE CHONDROITIN ADV PO Reported on 5/11/2017       IBUPROFEN PO        imiquimod (ALDARA) 5 % cream Apply a small sized amount to warts or molluscum three times weekly at bedtime.   Wash off after 8 hours.   May use for up to 16 weeks. (Patient not taking: Reported on 5/18/2018) 12 packet 6     KRILL OIL PO        MAGNESIUM PO Take 400 mg by mouth daily        MILK THISTLE PO        Multiple Vitamin (DAILY MULTIVITAMIN PO)        Probiotic Product (PROBIOTIC PO)        Riboflavin (VITAMIN  B-2) 25 MG TABS tablet        TURMERIC PO        vitamin  B complex with vitamin C (VITAMIN  B COMPLEX) TABS Take 1 tablet by mouth daily         ROS:     A 10-point  review of systems was performed and is positive for that noted above in the HPI and as seen below.  All other areas are negative.     Numbness in feet?  no   Calf pain with walking? no  Recent foot/ankle injury? no  Weight change over past 12 months? 20# loss  Self perception as overweight? yes  Recent flu-like symptoms? no  Joint pain other than feet ? no    Social History: Employment:  Desk job;  Exercise/Physical activity:  Moderate walk a few times/ week;  Tobacco use:  no  Social History     Socioeconomic History     Marital status:      Spouse name: Not on file     Number of children: Not on file     Years of education: Not on file     Highest education level: Bachelor's degree (e.g., BA, AB, BS)   Occupational History     Not on file   Social Needs     Financial resource strain: Not on file     Food insecurity:     Worry: Not on file     Inability: Not on file     Transportation needs:     Medical: Not on file     Non-medical: Not on file   Tobacco Use     Smoking status: Never Smoker     Smokeless tobacco: Never Used   Substance and Sexual Activity     Alcohol use: Yes     Frequency: Never     Drinks per session: Patient refused     Binge frequency: Never     Comment: 1 time per week, 2 per time     Drug use: No     Sexual activity: Yes     Partners: Male     Birth control/protection: Pill     Comment: Aviane   Lifestyle     Physical activity:     Days per week: 1 day     Minutes per session: 20 min     Stress: Not at all   Relationships     Social connections:     Talks on phone: Patient refused     Gets together: Twice a week     Attends Shinto service: Patient refused     Active member of club or organization: Yes     Attends meetings of clubs or organizations: More than 4 times per year     Relationship status:      Intimate partner violence:     Fear of current or ex partner: Not on file     Emotionally abused: Not on file     Physically abused: Not on file     Forced sexual activity:  "Not on file   Other Topics Concern     Parent/sibling w/ CABG, MI or angioplasty before 65F 55M? Not Asked      Service No     Blood Transfusions No     Caffeine Concern No     Occupational Exposure No     Hobby Hazards No     Sleep Concern No     Stress Concern No     Weight Concern No     Special Diet No     Back Care No     Exercise Yes     Bike Helmet No     Seat Belt Yes     Self-Exams Yes   Social History Narrative     Not on file       Family history:  Family History   Problem Relation Age of Onset     Ulcerative Colitis Mother      Arrhythmia Mother      Hypertension Maternal Grandmother      Cancer Maternal Aunt      Cancer Paternal Aunt      Diabetes No family hx of      Cerebrovascular Disease No family hx of      Thyroid Disease No family hx of      Glaucoma No family hx of      Macular Degeneration No family hx of        Rheumatoid arthritis:  no  Foot Problems: parent - bunion  Diabetes: no      EXAM:    Vitals: /66   Ht 1.674 m (5' 5.9\")   Wt 88.6 kg (195 lb 4.8 oz)   BMI 31.62 kg/m     BMI: Body mass index is 31.62 kg/m .  Height: 5' 5.9\"    Constitutional/ general:  Pt is in no apparent distress, appears well-nourished.  Cooperative with history and physical exam.     Vascular:  Pedal pulses are palpable bilaterally for both the DP and PT arteries.  CFT < 3 sec.  No edema.  Pedal hair growth noted.     Neuro:  Alert and oriented x 3. Coordinated gait.  Light touch sensation is intact to the L4, L5, S1 distributions. No obvious deficits.  No evidence of neurological-based weakness, spasticity, or contracture in the lower extremities.     Derm: Normal texture and turgor.  No erythema, ecchymosis, or cyanosis.  No open lesions.     Musculoskeletal:    Lower extremity muscle strength is normal.  Patient is ambulatory without an assistive device or brace .  No gross deformities.  Dorsal medial eminence, left 1st metatarsal head. Abnormal limitation of left hallux dorsiflexion with, and " without, loading of the forefoot.        Radiographic Exam:  X-Ray Findings:  I personally reviewed the 7/22/19 films.  Sclerosis around the left 1st metatarsophalangeal joint.  Severe joint space narrowing.  Roxy-articular spurring.       Arturo Harris DPM, FACFAS, MS    [place] Department of Podiatry/Foot & Ankle Surgery                Again, thank you for allowing me to participate in the care of your patient.        Sincerely,        Arturo Harris DPM

## 2019-08-14 NOTE — PATIENT INSTRUCTIONS
"Thank you for choosing [facility]!    DR. OVALLES'S CLINIC LOCATIONS     MONDAY - [place] WEDNESDAY (AM ONLY) - [place]   [address] [place] [address] [id number] [address]   [phone] / [phone] / [phone]       THURSDAY - [place] SCHEDULE SURGERY: [phone]   [address] APPOINTMENTS: [phone]   [place] [address] BILLING QUESTIONS: [phone] / [phone] TRIAGE NURSE: [phone]       DEGENERATIVE ARTHRITIS OF THE BIG TOE JOINT   (hallux limitus/hallux rigidus)   Arthritis of the joint at the base of the big toe (metatarsophalangeal joint) has several causes. Usually it results from repetitive trauma to the joint, secondary to abnormal foot mechanics. Often it is hereditary. However, a one-time traumatic event can lead to arthritis. The condition doesn't improve with time, and even with treatment, can worsen. The cartilage wears out, joint surfaces are no longer smooth, bone rubs on bone, inflammation occurs with pain, and eventually bone spurs and loose fragments might develop.   The joint is often painful with activity, worse with flimsy shoes or walking barefoot, and it slowly progresses over time. A person might notice the toe \"locking up\" with walking. There often is an obvious, and irritating, bony bump on top of the foot. Shoes might be uncomfortable. In some people the pain is so bothersome that recreational activities sometimes even normal daily activities are difficult to perform.   The pain from this arthritis is likely a combination of joint jamming, cartilage loss and inflammation, and irritation from shoes rubbing on the bump. Sometimes other parts of the foot, leg, or back hurt from altering one's walk to compensate for the painful joint.     Ways to help a person live with the discomfort include wearing a good, supportive shoe with a rigid, rocker-type bottom. An " example is a hiking boot. A rigid sole minimizes bending of the joint, and therefore, joint motion and pain. Shoes with a high toe box allow for less rubbing on the bump. Avoiding barefoot walking, sandals, flip-flops and slippers usually helps.   Sometimes an insert or orthotic provides symptom relief. This might make shoe fit more difficult. Pads over the bump and occasionally injections into the joint provide relief.   Surgery for this condition is aimed towards alleviating pain. It does not cure the arthritis nor does it guarantee better joint motion. Depending on the condition of the metatarsophalangeal joint, there are several surgiqal options:    1.  Cutting off the bony bump(s) and cleaning the joint    2.  Loosening the joint up by making cuts in the first metatarsal bone or the big toe bone and removing a small section of bone.    3.  Repositioning bone to minimize jamming of the joint.    4.  In severe cases, the joint is fused. By fusing the joint, it will never bend again. This resolves the pain, because it's the movement of a worn out joint that causes pain. Oftentimes the operation involves a combination of these procedures and. requires the use of screws, pins, and/or a small surgical plate.     Healing after surgery requires about six weeks of protection. This allows the bone to heal. Maximum recovery takes about one year. The scar tissue and joint structures require this amount of time to finish the healing process. Expect stiffness, swelling and numbness during that time frame.   Surgery for arthritis of the metatarsophalangeal joint does involve side effects. Some side effects are predictable and others are less common but do occur. A scar will be visible and could be irritated by shoes. The shoe may rub on the screw or internal pin requiring surgical removal of these fixation devices. The screw and pin would likely be left in place for a full year. The first toe may remain stiff after surgery.  The amount of stiffness is variable. Most people never regain normal motion of the first toe. This is due to scar tissue inherent to any surgery, in addition to the cumUlative effects of arthritis. Sometimes the big toe drifts to one side or the other. Joint fusion is one option to correct an unstable, drifting toe. This procedure straightens the toe, however, no motion remains.   All surgical procedures involve risk of infection, numbness, pain, delayed bone healing, osteotomy (bone cut) dislocation, blood clots, continued foot pain, etc. Arthritic joint surgery is quite complex and should not be taken lightly.    Any skin incision can lead to infection. Deep infection might involve the bone and thus repeat surgery and six weeks of IV antibiotics. Scar tissue can cause nerve pain or numbness. his is generally temporary but can be permanent. We do not have treatments that cure nerve problems. Second toe pain could be related to altered mechanics and pressure transferred to the second toe. Delayed bone healing would lengthen the healing time. Some bones simply do not heal. This requires repeat surgery, electronic bone stimulation and/or extended protection. Smokers have an approximate 20% chance of poor bone healing. This is double that of a non-smoker. The bone cut may displace. This may need to be repaired with a second operation. Displacement can cause joint malalignment. Immobility after surgery can cause a blood clot in the legs and lungs. This could result in death.   Foot pain is complex. Most feet hurt for more than one reason. Operating on the arthritic   big toe joint will not necessarily create a pain free foot. Appropriate shoes, healthy body weight, avoidance of bare foot walking and moderation of activity will always be   necessary to enjoy foot comfort. Arthritis is incurable even with surgery.     Surgery for this type of arthritis is nevertheless quite successful. Most surgical patients are pleased  with their foot following surgery. Many of the issues described above can be controlled by taking proper care of your foot during the healing process.   Cosmetic bump surgery is discouraged for the reasons listed above. A bump and joint that is comfortable when wearing appropriate shoes should simply be treated with appropriate shoes.   Your surgeon would be happy to fully describe any of the above issues. You should pursue a full understanding of the operation, recovery process and any potential problems that could develop.

## 2020-02-23 ENCOUNTER — HEALTH MAINTENANCE LETTER (OUTPATIENT)
Age: 50
End: 2020-02-23

## 2020-03-06 ENCOUNTER — HOSPITAL ENCOUNTER (OUTPATIENT)
Dept: MAMMOGRAPHY | Facility: CLINIC | Age: 50
Discharge: HOME | End: 2020-03-06
Attending: NURSE PRACTITIONER | Admitting: NURSE PRACTITIONER
Payer: COMMERCIAL

## 2020-03-06 DIAGNOSIS — Z00.00 ROUTINE GENERAL MEDICAL EXAMINATION AT A HEALTH CARE FACILITY: ICD-10-CM

## 2020-03-06 PROCEDURE — 77063 BREAST TOMOSYNTHESIS BI: CPT

## 2020-07-01 ENCOUNTER — TRANSFERRED RECORDS (OUTPATIENT)
Dept: HEALTH INFORMATION MANAGEMENT | Facility: CLINIC | Age: 50
End: 2020-07-01

## 2020-07-01 LAB — RETINOPATHY: NORMAL

## 2020-09-22 ENCOUNTER — MYC MEDICAL ADVICE (OUTPATIENT)
Dept: PEDIATRICS | Facility: CLINIC | Age: 50
End: 2020-09-22

## 2020-09-22 NOTE — TELEPHONE ENCOUNTER
"Sent [employer] message.      - Fermin \"Sj\" VISHNU Whitfield - Patient Advocate Liason (PAL)  [facility]    "

## 2020-09-24 NOTE — TELEPHONE ENCOUNTER
"Leanne Valdez: Pt is seeking preventative visit and to discuss symptoms of fatigue, possible bonnie-menopause, and would like labs ordered prior to visit. Pt is informed that you would likely be on maternity leave during the time-frame she seeking an appointment, and that it would be appropriated to wait for labs to be ordered during that visit. However, the pt would like labs ordered prior if advised by you. Please advise.      - Fermin \"Sj\" VISHNU Whitfield - Patient Advocate Liason (PAL)  [facility]    "

## 2020-10-01 ENCOUNTER — VIRTUAL VISIT (OUTPATIENT)
Dept: PEDIATRICS | Facility: CLINIC | Age: 50
End: 2020-10-01
Payer: COMMERCIAL

## 2020-10-01 DIAGNOSIS — R70.1 RETICULOCYTOSIS: ICD-10-CM

## 2020-10-01 DIAGNOSIS — R73.01 IMPAIRED FASTING GLUCOSE: ICD-10-CM

## 2020-10-01 DIAGNOSIS — R05.9 COUGH: ICD-10-CM

## 2020-10-01 DIAGNOSIS — N95.1 MENOPAUSAL SYNDROME (HOT FLASHES): ICD-10-CM

## 2020-10-01 DIAGNOSIS — R17 ELEVATED BILIRUBIN: Primary | ICD-10-CM

## 2020-10-01 DIAGNOSIS — R00.2 PALPITATION: ICD-10-CM

## 2020-10-01 DIAGNOSIS — Z00.00 ROUTINE GENERAL MEDICAL EXAMINATION AT A HEALTH CARE FACILITY: ICD-10-CM

## 2020-10-01 PROCEDURE — 99214 OFFICE O/P EST MOD 30 MIN: CPT | Mod: 95 | Performed by: NURSE PRACTITIONER

## 2020-10-01 NOTE — PROGRESS NOTES
"Mira Luis is a 50 year old female who is being evaluated via a billable video visit.      The patient has been notified of following:     \"This video visit will be conducted via a call between you and your physician/provider. We have found that certain health care needs can be provided without the need for an in-person physical exam.  This service lets us provide the care you need with a video conversation.  If a prescription is necessary we can send it directly to your pharmacy.  If lab work is needed we can place an order for that and you can then stop by our lab to have the test done at a later time.    Video visits are billed at different rates depending on your insurance coverage.  Please reach out to your insurance provider with any questions.    If during the course of the call the physician/provider feels a video visit is not appropriate, you will not be charged for this service.\"    Patient has given verbal consent for Video visit? Yes  How would you like to obtain your AVS? MyChart  If you are dropped from the video visit, the video invite should be resent to: Send to e-mail at: [email or web address]  Will anyone else be joining your video visit? No    Subjective     Mira Luis is a 50 year old female who presents today via video visit for the following health issues:    Eye exam? July 2020 - Dr. Zechariah Wheeler  Asked to go to pharmacy for flu, tdap vaccines  HPI    Discuss perimenopausal symptoms & diagnostic options  Patient states symptoms have abated somewhat but still wants to discuss..:    Was exposed to someone who was exposed to covid back in February. The patient had viral sx. She could not get tested    Video Start Time: 3:21 PM    Review of Systems   Constitutional, HEENT, cardiovascular, pulmonary, GI, , musculoskeletal, neuro, skin, endocrine and psych systems are negative, except as otherwise noted.      Objective       Vitals:  No vitals were obtained today due to virtual visit.    Physical " Exam     GENERAL: Healthy, alert and no distress  EYES: Eyes grossly normal to inspection.  No discharge or erythema, or obvious scleral/conjunctival abnormalities.  RESP: No audible wheeze, cough, or visible cyanosis.  No visible retractions or increased work of breathing.    SKIN: Visible skin clear. No significant rash, abnormal pigmentation or lesions.  NEURO: Cranial nerves grossly intact.  Mentation and speech appropriate for age.  PSYCH: Mentation appears normal, affect normal/bright, judgement and insight intact, normal speech and appearance well-groomed.      No results found for any visits on 10/01/20.        Assessment & Plan   Elevated bilirubin  Needs recheck. Feels jaundiced but does not look jaundiced on video. No RUQ pain  - Comprehensive metabolic panel (BMP + Alb, Alk Phos, ALT, AST, Total. Bili, TP); Future    Reticulocytosis  Did not f/u with heme as recommended last year. Needs recheck  - CBC with platelets differential; Future  - Reticulocyte count; Future    Cough  Had a cough recently, now resolved.   - COVID-19 Virus (Coronavirus) Antibody & Titer Reflex; Future    Menopausal syndrome (hot flashes)  Wanting labs. I don't think...  - REVIEW OF HEALTH MAINTENANCE PROTOCOL ORDERS; Future  - Follicle stimulating hormone; Future    Impaired fasting glucose  - Comprehensive metabolic panel (BMP + Alb, Alk Phos, ALT, AST, Total. Bili, TP); Future    Routine general medical examination at a health care facility  Naturopath requesting all of these labs.   - REVIEW OF HEALTH MAINTENANCE PROTOCOL ORDERS; Future  - Comprehensive metabolic panel (BMP + Alb, Alk Phos, ALT, AST, Total. Bili, TP); Future  - CBC with platelets differential; Future  - Reticulocyte count; Future  - CRP, inflammation; Future  - Lipid panel reflex to direct LDL Fasting; Future  - Fibrinogen activity; Future  - **A1C FUTURE 1yr; Future  - Magnesium; Future  - Ferritin; Future  - Homocysteine; Future  - Vitamin D Deficiency;  Future  - Anti thyroglobulin antibody; Future  - Thyroid stimulating immunoglobulin; Future  - Thyroid peroxidase antibody; Future  - T3 reverse; Future  - T3 uptake; Future  - T4, free; Future  - T3, Free; Future  - TSH; Future  - Iron and iron binding capacity; Future  - GGT; Future  - Comprehensive metabolic panel (BMP + Alb, Alk Phos, ALT, AST, Total. Bili, TP); Future    Palpitation  Reports she feels heart is racing when she gets up in the morning. Not irregular. No dizziness, chest pain, shortness of breath, etc.   -Asked her to check rate and rhythm for a few days and get back to me. May need zio. Discussed reasons to seek care urgently.             See Patient Instructions    No follow-ups on file.    LILIANA Diaz CNP  [facility] [place]      Video-Visit Details    Type of service:  Video Visit    Video End Time:340    Originating Location (pt. Location): Home    Distant Location (provider location):  [facility] [place]     Platform used for Video Visit: Eric

## 2020-10-09 DIAGNOSIS — R73.01 IMPAIRED FASTING GLUCOSE: ICD-10-CM

## 2020-10-09 DIAGNOSIS — R70.1 RETICULOCYTOSIS: ICD-10-CM

## 2020-10-09 DIAGNOSIS — R05.9 COUGH: ICD-10-CM

## 2020-10-09 DIAGNOSIS — Z00.00 ROUTINE GENERAL MEDICAL EXAMINATION AT A HEALTH CARE FACILITY: ICD-10-CM

## 2020-10-09 DIAGNOSIS — N95.1 MENOPAUSAL SYNDROME (HOT FLASHES): ICD-10-CM

## 2020-10-09 DIAGNOSIS — R17 ELEVATED BILIRUBIN: ICD-10-CM

## 2020-10-09 LAB
BASOPHILS # BLD AUTO: 0 10E9/L (ref 0–0.2)
BASOPHILS NFR BLD AUTO: 0.7 %
CRP SERPL-MCNC: <2.9 MG/L (ref 0–8)
DIFFERENTIAL METHOD BLD: NORMAL
EOSINOPHIL # BLD AUTO: 0.1 10E9/L (ref 0–0.7)
EOSINOPHIL NFR BLD AUTO: 2.7 %
ERYTHROCYTE [DISTWIDTH] IN BLOOD BY AUTOMATED COUNT: 12.4 % (ref 10–15)
FIBRINOGEN PPP-MCNC: 266 MG/DL (ref 200–420)
FSH SERPL-ACNC: 87.5 IU/L
HBA1C MFR BLD: 4.7 % (ref 0–5.6)
HCT VFR BLD AUTO: 39.8 % (ref 35–47)
HGB BLD-MCNC: 13.9 G/DL (ref 11.7–15.7)
LYMPHOCYTES # BLD AUTO: 1.6 10E9/L (ref 0.8–5.3)
LYMPHOCYTES NFR BLD AUTO: 35.5 %
MCH RBC QN AUTO: 31.7 PG (ref 26.5–33)
MCHC RBC AUTO-ENTMCNC: 34.9 G/DL (ref 31.5–36.5)
MCV RBC AUTO: 91 FL (ref 78–100)
MONOCYTES # BLD AUTO: 0.4 10E9/L (ref 0–1.3)
MONOCYTES NFR BLD AUTO: 8.9 %
NEUTROPHILS # BLD AUTO: 2.3 10E9/L (ref 1.6–8.3)
NEUTROPHILS NFR BLD AUTO: 52.2 %
PLATELET # BLD AUTO: 190 10E9/L (ref 150–450)
RBC # BLD AUTO: 4.38 10E12/L (ref 3.8–5.2)
RETICS # AUTO: 116.9 10E9/L (ref 25–95)
RETICS/RBC NFR AUTO: 2.6 % (ref 0.5–2)
T3FREE SERPL-MCNC: 3.6 PG/ML (ref 2.3–4.2)
WBC # BLD AUTO: 4.4 10E9/L (ref 4–11)

## 2020-10-09 PROCEDURE — 84482 T3 REVERSE: CPT | Mod: 90 | Performed by: NURSE PRACTITIONER

## 2020-10-09 PROCEDURE — 83090 ASSAY OF HOMOCYSTEINE: CPT | Performed by: NURSE PRACTITIONER

## 2020-10-09 PROCEDURE — 85384 FIBRINOGEN ACTIVITY: CPT | Performed by: NURSE PRACTITIONER

## 2020-10-09 PROCEDURE — 84439 ASSAY OF FREE THYROXINE: CPT | Performed by: NURSE PRACTITIONER

## 2020-10-09 PROCEDURE — 84481 FREE ASSAY (FT-3): CPT | Performed by: NURSE PRACTITIONER

## 2020-10-09 PROCEDURE — 86140 C-REACTIVE PROTEIN: CPT | Performed by: NURSE PRACTITIONER

## 2020-10-09 PROCEDURE — 86800 THYROGLOBULIN ANTIBODY: CPT | Performed by: NURSE PRACTITIONER

## 2020-10-09 PROCEDURE — 84445 ASSAY OF TSI GLOBULIN: CPT | Mod: 90 | Performed by: NURSE PRACTITIONER

## 2020-10-09 PROCEDURE — 85045 AUTOMATED RETICULOCYTE COUNT: CPT | Performed by: NURSE PRACTITIONER

## 2020-10-09 PROCEDURE — 86769 SARS-COV-2 COVID-19 ANTIBODY: CPT | Performed by: NURSE PRACTITIONER

## 2020-10-09 PROCEDURE — 80050 GENERAL HEALTH PANEL: CPT | Performed by: NURSE PRACTITIONER

## 2020-10-09 PROCEDURE — 80061 LIPID PANEL: CPT | Performed by: NURSE PRACTITIONER

## 2020-10-09 PROCEDURE — 83735 ASSAY OF MAGNESIUM: CPT | Performed by: NURSE PRACTITIONER

## 2020-10-09 PROCEDURE — 86376 MICROSOMAL ANTIBODY EACH: CPT | Performed by: NURSE PRACTITIONER

## 2020-10-09 PROCEDURE — 82977 ASSAY OF GGT: CPT | Performed by: NURSE PRACTITIONER

## 2020-10-09 PROCEDURE — 36415 COLL VENOUS BLD VENIPUNCTURE: CPT | Performed by: NURSE PRACTITIONER

## 2020-10-09 PROCEDURE — 83540 ASSAY OF IRON: CPT | Performed by: NURSE PRACTITIONER

## 2020-10-09 PROCEDURE — 83001 ASSAY OF GONADOTROPIN (FSH): CPT | Performed by: NURSE PRACTITIONER

## 2020-10-09 PROCEDURE — 83550 IRON BINDING TEST: CPT | Performed by: NURSE PRACTITIONER

## 2020-10-09 PROCEDURE — 82306 VITAMIN D 25 HYDROXY: CPT | Performed by: NURSE PRACTITIONER

## 2020-10-09 PROCEDURE — 83036 HEMOGLOBIN GLYCOSYLATED A1C: CPT | Performed by: NURSE PRACTITIONER

## 2020-10-09 PROCEDURE — 99000 SPECIMEN HANDLING OFFICE-LAB: CPT | Performed by: NURSE PRACTITIONER

## 2020-10-09 PROCEDURE — 82728 ASSAY OF FERRITIN: CPT | Performed by: NURSE PRACTITIONER

## 2020-10-10 LAB
ALBUMIN SERPL-MCNC: 3.8 G/DL (ref 3.4–5)
ALP SERPL-CCNC: 56 U/L (ref 40–150)
ALT SERPL W P-5'-P-CCNC: 29 U/L (ref 0–50)
ANION GAP SERPL CALCULATED.3IONS-SCNC: 11 MMOL/L (ref 3–14)
AST SERPL W P-5'-P-CCNC: 21 U/L (ref 0–45)
BILIRUB SERPL-MCNC: 1.3 MG/DL (ref 0.2–1.3)
BUN SERPL-MCNC: 14 MG/DL (ref 7–30)
CALCIUM SERPL-MCNC: 9.2 MG/DL (ref 8.5–10.1)
CHLORIDE SERPL-SCNC: 110 MMOL/L (ref 94–109)
CHOLEST SERPL-MCNC: 213 MG/DL
CO2 SERPL-SCNC: 19 MMOL/L (ref 20–32)
COVID-19 SPIKE RBD ABY TITER: NORMAL
COVID-19 SPIKE RBD ABY: NEGATIVE
CREAT SERPL-MCNC: 1.01 MG/DL (ref 0.52–1.04)
FERRITIN SERPL-MCNC: 154 NG/ML (ref 8–252)
GFR SERPL CREATININE-BSD FRML MDRD: 65 ML/MIN/{1.73_M2}
GGT SERPL-CCNC: 16 U/L (ref 0–40)
GLUCOSE SERPL-MCNC: 99 MG/DL (ref 70–99)
HDLC SERPL-MCNC: 64 MG/DL
IRON SATN MFR SERPL: 36 % (ref 15–46)
IRON SERPL-MCNC: 93 UG/DL (ref 35–180)
LDLC SERPL CALC-MCNC: 130 MG/DL
MAGNESIUM SERPL-MCNC: 2.1 MG/DL (ref 1.6–2.3)
NONHDLC SERPL-MCNC: 149 MG/DL
POTASSIUM SERPL-SCNC: 3.9 MMOL/L (ref 3.4–5.3)
PROT SERPL-MCNC: 7.2 G/DL (ref 6.8–8.8)
SODIUM SERPL-SCNC: 140 MMOL/L (ref 133–144)
T3RU NFR SERPL: 34 % (ref 28–41)
T4 FREE SERPL-MCNC: 0.9 NG/DL (ref 0.76–1.46)
TIBC SERPL-MCNC: 258 UG/DL (ref 240–430)
TRIGL SERPL-MCNC: 97 MG/DL
TSH SERPL DL<=0.005 MIU/L-ACNC: 2.72 MU/L (ref 0.4–4)

## 2020-10-11 LAB — DEPRECATED CALCIDIOL+CALCIFEROL SERPL-MC: 67 UG/L (ref 20–75)

## 2020-10-12 LAB
THYROGLOB AB SERPL IA-ACNC: <20 IU/ML (ref 0–40)
THYROPEROXIDASE AB SERPL-ACNC: <10 IU/ML

## 2020-10-13 LAB
T3REVERSE SERPL-MCNC: 11 NG/DL (ref 9–27)
TSI SER-ACNC: <1 TSI INDEX

## 2020-10-14 LAB — HCYS SERPL-SCNC: 11.1 UMOL/L (ref 4–12)

## 2020-10-15 ENCOUNTER — TELEPHONE (OUTPATIENT)
Dept: ONCOLOGY | Facility: CLINIC | Age: 50
End: 2020-10-15

## 2020-10-16 ENCOUNTER — TELEPHONE (OUTPATIENT)
Dept: ONCOLOGY | Facility: CLINIC | Age: 50
End: 2020-10-16

## 2020-10-16 NOTE — TELEPHONE ENCOUNTER
The patient would not verify her chart so I told her she can call back to schedule and she agreed to call back.

## 2020-12-13 ENCOUNTER — HEALTH MAINTENANCE LETTER (OUTPATIENT)
Age: 50
End: 2020-12-13

## 2021-06-02 ENCOUNTER — E-VISIT (OUTPATIENT)
Dept: PEDIATRICS | Facility: CLINIC | Age: 51
End: 2021-06-02
Payer: COMMERCIAL

## 2021-06-02 DIAGNOSIS — Z87.898 HISTORY OF SEVERE REACTION TO INFLUENZA VACCINE: Primary | ICD-10-CM

## 2021-06-02 PROCEDURE — 99421 OL DIG E/M SVC 5-10 MIN: CPT | Performed by: NURSE PRACTITIONER

## 2021-06-06 ENCOUNTER — HEALTH MAINTENANCE LETTER (OUTPATIENT)
Age: 51
End: 2021-06-06

## 2021-06-11 ENCOUNTER — HOSPITAL ENCOUNTER (OUTPATIENT)
Dept: MAMMOGRAPHY | Facility: CLINIC | Age: 51
Discharge: HOME | End: 2021-06-11
Attending: NURSE PRACTITIONER | Admitting: NURSE PRACTITIONER
Payer: COMMERCIAL

## 2021-06-11 DIAGNOSIS — Z12.31 VISIT FOR SCREENING MAMMOGRAM: ICD-10-CM

## 2021-06-11 PROCEDURE — 77063 BREAST TOMOSYNTHESIS BI: CPT

## 2021-06-17 ENCOUNTER — APPOINTMENT (OUTPATIENT)
Dept: URGENT CARE | Facility: CLINIC | Age: 51
End: 2021-06-17
Payer: COMMERCIAL

## 2021-06-22 ASSESSMENT — ENCOUNTER SYMPTOMS
FEVER: 0
HEMATURIA: 0
COUGH: 0
JOINT SWELLING: 0
WEAKNESS: 0
EYE PAIN: 0
CONSTIPATION: 0
HEARTBURN: 0
ARTHRALGIAS: 0
ABDOMINAL PAIN: 0
FREQUENCY: 0
PARESTHESIAS: 0
NERVOUS/ANXIOUS: 0
CHILLS: 0
SORE THROAT: 0
DIARRHEA: 0
NAUSEA: 0
SHORTNESS OF BREATH: 0
DIZZINESS: 0
DYSURIA: 0
BREAST MASS: 0
HEADACHES: 0
HEMATOCHEZIA: 0
PALPITATIONS: 0
MYALGIAS: 0

## 2021-06-28 ENCOUNTER — OFFICE VISIT (OUTPATIENT)
Dept: FAMILY MEDICINE | Facility: CLINIC | Age: 51
End: 2021-06-28
Payer: COMMERCIAL

## 2021-06-28 VITALS
HEIGHT: 66 IN | OXYGEN SATURATION: 97 % | BODY MASS INDEX: 34.79 KG/M2 | RESPIRATION RATE: 16 BRPM | DIASTOLIC BLOOD PRESSURE: 80 MMHG | HEART RATE: 65 BPM | SYSTOLIC BLOOD PRESSURE: 118 MMHG | TEMPERATURE: 98.2 F | WEIGHT: 216.5 LBS

## 2021-06-28 DIAGNOSIS — Z00.00 ROUTINE GENERAL MEDICAL EXAMINATION AT A HEALTH CARE FACILITY: Primary | ICD-10-CM

## 2021-06-28 DIAGNOSIS — N95.1 MENOPAUSAL SYNDROME (HOT FLASHES): ICD-10-CM

## 2021-06-28 DIAGNOSIS — Z12.11 COLON CANCER SCREENING: ICD-10-CM

## 2021-06-28 DIAGNOSIS — E66.812 CLASS 2 OBESITY DUE TO EXCESS CALORIES WITHOUT SERIOUS COMORBIDITY WITH BODY MASS INDEX (BMI) OF 35.0 TO 35.9 IN ADULT: ICD-10-CM

## 2021-06-28 DIAGNOSIS — Z12.4 CERVICAL CANCER SCREENING: ICD-10-CM

## 2021-06-28 DIAGNOSIS — E66.09 CLASS 2 OBESITY DUE TO EXCESS CALORIES WITHOUT SERIOUS COMORBIDITY WITH BODY MASS INDEX (BMI) OF 35.0 TO 35.9 IN ADULT: ICD-10-CM

## 2021-06-28 DIAGNOSIS — Z01.89 ENCOUNTER FOR ROUTINE LABORATORY TESTING: ICD-10-CM

## 2021-06-28 LAB
BASOPHILS # BLD AUTO: 0 10E9/L (ref 0–0.2)
BASOPHILS NFR BLD AUTO: 0.5 %
CRP SERPL-MCNC: <2.9 MG/L (ref 0–8)
DEPRECATED CALCIDIOL+CALCIFEROL SERPL-MC: 49 UG/L (ref 20–75)
DIFFERENTIAL METHOD BLD: ABNORMAL
EOSINOPHIL # BLD AUTO: 0.1 10E9/L (ref 0–0.7)
EOSINOPHIL NFR BLD AUTO: 1.9 %
ERYTHROCYTE [DISTWIDTH] IN BLOOD BY AUTOMATED COUNT: 12.9 % (ref 10–15)
ERYTHROCYTE [SEDIMENTATION RATE] IN BLOOD BY WESTERGREN METHOD: 5 MM/H (ref 0–20)
FIBRINOGEN PPP-MCNC: 266 MG/DL (ref 200–420)
FSH SERPL-ACNC: 69.4 IU/L
HBA1C MFR BLD: 5.1 % (ref 0–5.6)
HCT VFR BLD AUTO: 39.5 % (ref 35–47)
HCV AB SERPL QL IA: NONREACTIVE
HGB BLD-MCNC: 14.1 G/DL (ref 11.7–15.7)
LH SERPL-ACNC: 30.1 IU/L
LYMPHOCYTES # BLD AUTO: 1.4 10E9/L (ref 0.8–5.3)
LYMPHOCYTES NFR BLD AUTO: 36.9 %
MCH RBC QN AUTO: 31.9 PG (ref 26.5–33)
MCHC RBC AUTO-ENTMCNC: 35.7 G/DL (ref 31.5–36.5)
MCV RBC AUTO: 89 FL (ref 78–100)
MONOCYTES # BLD AUTO: 0.4 10E9/L (ref 0–1.3)
MONOCYTES NFR BLD AUTO: 9.6 %
NEUTROPHILS # BLD AUTO: 1.9 10E9/L (ref 1.6–8.3)
NEUTROPHILS NFR BLD AUTO: 51.1 %
PLATELET # BLD AUTO: 179 10E9/L (ref 150–450)
RBC # BLD AUTO: 4.42 10E12/L (ref 3.8–5.2)
RETICS # AUTO: 102.6 10E9/L (ref 25–95)
RETICS/RBC NFR AUTO: 2.3 % (ref 0.5–2)
T3FREE SERPL-MCNC: 2.9 PG/ML (ref 2.3–4.2)
WBC # BLD AUTO: 3.7 10E9/L (ref 4–11)

## 2021-06-28 PROCEDURE — 84439 ASSAY OF FREE THYROXINE: CPT | Performed by: FAMILY MEDICINE

## 2021-06-28 PROCEDURE — 83540 ASSAY OF IRON: CPT | Performed by: FAMILY MEDICINE

## 2021-06-28 PROCEDURE — 86803 HEPATITIS C AB TEST: CPT | Performed by: FAMILY MEDICINE

## 2021-06-28 PROCEDURE — 85384 FIBRINOGEN ACTIVITY: CPT | Performed by: FAMILY MEDICINE

## 2021-06-28 PROCEDURE — 99000 SPECIMEN HANDLING OFFICE-LAB: CPT | Performed by: FAMILY MEDICINE

## 2021-06-28 PROCEDURE — 83001 ASSAY OF GONADOTROPIN (FSH): CPT | Performed by: FAMILY MEDICINE

## 2021-06-28 PROCEDURE — 80061 LIPID PANEL: CPT | Performed by: FAMILY MEDICINE

## 2021-06-28 PROCEDURE — 83735 ASSAY OF MAGNESIUM: CPT | Performed by: FAMILY MEDICINE

## 2021-06-28 PROCEDURE — 82306 VITAMIN D 25 HYDROXY: CPT | Performed by: FAMILY MEDICINE

## 2021-06-28 PROCEDURE — 83090 ASSAY OF HOMOCYSTEINE: CPT | Performed by: FAMILY MEDICINE

## 2021-06-28 PROCEDURE — 82728 ASSAY OF FERRITIN: CPT | Performed by: FAMILY MEDICINE

## 2021-06-28 PROCEDURE — 86140 C-REACTIVE PROTEIN: CPT | Performed by: FAMILY MEDICINE

## 2021-06-28 PROCEDURE — 83002 ASSAY OF GONADOTROPIN (LH): CPT | Performed by: FAMILY MEDICINE

## 2021-06-28 PROCEDURE — 84481 FREE ASSAY (FT-3): CPT | Performed by: FAMILY MEDICINE

## 2021-06-28 PROCEDURE — 85045 AUTOMATED RETICULOCYTE COUNT: CPT | Performed by: FAMILY MEDICINE

## 2021-06-28 PROCEDURE — 99396 PREV VISIT EST AGE 40-64: CPT | Performed by: FAMILY MEDICINE

## 2021-06-28 PROCEDURE — 83550 IRON BINDING TEST: CPT | Performed by: FAMILY MEDICINE

## 2021-06-28 PROCEDURE — 80050 GENERAL HEALTH PANEL: CPT | Performed by: FAMILY MEDICINE

## 2021-06-28 PROCEDURE — 36415 COLL VENOUS BLD VENIPUNCTURE: CPT | Performed by: FAMILY MEDICINE

## 2021-06-28 PROCEDURE — G0145 SCR C/V CYTO,THINLAYER,RESCR: HCPCS | Performed by: FAMILY MEDICINE

## 2021-06-28 PROCEDURE — 84445 ASSAY OF TSI GLOBULIN: CPT | Mod: 90 | Performed by: FAMILY MEDICINE

## 2021-06-28 PROCEDURE — 86800 THYROGLOBULIN ANTIBODY: CPT | Performed by: FAMILY MEDICINE

## 2021-06-28 PROCEDURE — 83036 HEMOGLOBIN GLYCOSYLATED A1C: CPT | Performed by: FAMILY MEDICINE

## 2021-06-28 PROCEDURE — 82977 ASSAY OF GGT: CPT | Performed by: FAMILY MEDICINE

## 2021-06-28 PROCEDURE — 85652 RBC SED RATE AUTOMATED: CPT | Performed by: FAMILY MEDICINE

## 2021-06-28 PROCEDURE — 87624 HPV HI-RISK TYP POOLED RSLT: CPT | Performed by: FAMILY MEDICINE

## 2021-06-28 RX ORDER — BLACK COHOSH ROOT 540 MG
1 CAPSULE ORAL DAILY
COMMUNITY
Start: 2021-06-09 | End: 2021-09-17

## 2021-06-28 RX ORDER — BRIMONIDINE TARTRATE 1.5 MG/ML
SOLUTION/ DROPS OPHTHALMIC
COMMUNITY
Start: 2020-08-09 | End: 2023-07-24

## 2021-06-28 ASSESSMENT — ENCOUNTER SYMPTOMS
EYE PAIN: 0
DIARRHEA: 0
ARTHRALGIAS: 0
CONSTIPATION: 0
WEAKNESS: 0
HEADACHES: 0
SHORTNESS OF BREATH: 0
MYALGIAS: 0
PALPITATIONS: 0
DYSURIA: 0
HEMATURIA: 0
BREAST MASS: 0
NERVOUS/ANXIOUS: 0
HEARTBURN: 0
CHILLS: 0
DIZZINESS: 0
NAUSEA: 0
FEVER: 0
JOINT SWELLING: 0
COUGH: 0
PARESTHESIAS: 0
ABDOMINAL PAIN: 0
FREQUENCY: 0
HEMATOCHEZIA: 0
SORE THROAT: 0

## 2021-06-28 ASSESSMENT — MIFFLIN-ST. JEOR: SCORE: 1622.92

## 2021-06-28 NOTE — PATIENT INSTRUCTIONS
Preventive Health Recommendations  Female Ages 40 to 49    Yearly exam:     See your health care provider every year in order to  1. Review health changes.   2. Discuss preventive care.    3. Review your medicines if your doctor prescribed any.      Get a Pap test every three years (unless you have an abnormal result and your provider advises testing more often).      If you get Pap tests with HPV test, you only need to test every 5 years, unless you have an abnormal result. You do not need a Pap test if your uterus was removed (hysterectomy) and you have not had cancer.      You should be tested each year for STDs (sexually transmitted diseases), if you're at risk.     Ask your doctor if you should have a mammogram.      Have a colonoscopy (test for colon cancer) if someone in your family has had colon cancer or polyps before age 50.       Have a cholesterol test every 5 years.       Have a diabetes test (fasting glucose) after age 45. If you are at risk for diabetes, you should have this test every 3 years.    Shots: Get a flu shot each year. Get a tetanus shot every 10 years.     Nutrition:     Eat at least 5 servings of fruits and vegetables each day.    Eat whole-grain bread, whole-wheat pasta and brown rice instead of white grains and rice.    Get adequate Calcium and Vitamin D.      Lifestyle    Exercise at least 150 minutes a week (an average of 30 minutes a day, 5 days a week). This will help you control your weight and prevent disease.    Limit alcohol to one drink per day.    No smoking.     Wear sunscreen to prevent skin cancer.    See your dentist every six months for an exam and cleaning.  Patient Education     Understanding Menopause  Menopause marks the point where you ve gone 12 months in a row without a period. The average age for this is around 51. But it can happen at younger or older ages. During the months or years before menopause, your body goes through many changes. It may be helpful to  understand these changes and what you can do about the symptoms that result.     Use a portable fan to help stay cool.    Symptoms  Perimenopause is sometimes called the menopause transition. It happens in the months or years before menopause. It may begin when you reach your mid-40s. During this time, your estrogen levels go up and down and then decrease. As a result, you may notice some of these symptoms:  Menstrual periods that come more or less often than normal  Menstrual periods that are lighter or heavier than normal  Increased premenstrual syndrome (PMS) symptoms  Hot flashes  Night sweats  Mood swings  Vaginal dryness with possible pain during sex  Trouble going to sleep or staying asleep  Decreased sexual drive and function  Urinating frequently  It is important to remember that you could become pregnant until 12 months have passed since your last menstrual period. Ask your healthcare provider about birth control choices.   Controlling symptoms  Your healthcare provider may suggest pills or an intrauterine device (IUD) that have the hormone progesterone. This can make your periods more regular and prevent excess bleeding. If you have symptoms due to lower estrogen levels, your healthcare provider may suggest pills that contain estrogen or progesterone. This is called hormone therapy.  There are also other prescription medicines that help control some of the symptoms, such as hot flashes, mood swings, and vaginal dryness.  Other ways for you to deal with symptoms are listed below.  Hot flashes. Wear layers that you can take off. Try all-cotton clothing, sheets, and blankets. Keep a glass of cold water by your bed.  Pain during sex. You can buy a water-based lubricant or vaginal moisturizer in the pharmacy that may help. Your provider may also prescribe an estrogen cream for your vagina.  Mood swings. Talking with friends who are going through the same changes can sometimes help.  [employer] last reviewed this  educational content on 3/1/2020    1257-6011 [employer]. All rights reserved. This information is not intended as a substitute for professional medical care. Always follow your healthcare professional's instructions.

## 2021-06-28 NOTE — PROGRESS NOTES
pap   SUBJECTIVE:   CC: Mira Luis is an 50 year old woman who presents for preventive health visit.     Patient has been advised of split billing requirements and indicates understanding: Yes  Healthy Habits:     Getting at least 3 servings of Calcium per day:  NO    Bi-annual eye exam:  Yes    Dental care twice a year:  Yes    Sleep apnea or symptoms of sleep apnea:  None    Diet:  Gluten-free/reduced and Other    Frequency of exercise:  2-3 days/week    Duration of exercise:  30-45 minutes    Taking medications regularly:  Not Applicable    Medication side effects:  Not applicable    PHQ-2 Total Score: 0    Additional concerns today:  Yes    Other concerns  1.COVID vaccine- had a strong reaction to flu vaccine and wonders about reaction polyethylene glycol polysorbate   2. Hot flashes- has tried many over the counter remedies without relief. Recently seen on 6/9 by functional medicine for evaluation.     Wt Readings from Last 4 Encounters:   06/28/21 98.2 kg (216 lb 8 oz)   08/14/19 88.6 kg (195 lb 4.8 oz)   07/22/19 88.6 kg (195 lb 4.8 oz)   10/02/18 85.4 kg (188 lb 3.2 oz)       Today's PHQ-2 Score:   PHQ-2 ( 1999 Pfizer) 6/22/2021   Q1: Little interest or pleasure in doing things 0   Q2: Feeling down, depressed or hopeless 0   PHQ-2 Score 0   Q1: Little interest or pleasure in doing things Not at all   Q2: Feeling down, depressed or hopeless Not at all   PHQ-2 Score 0       Abuse: Current or Past (Physical, Sexual or Emotional) - No  Do you feel safe in your environment? Yes    Have you ever done Advance Care Planning? (For example, a Health Directive, POLST, or a discussion with a medical provider or your loved ones about your wishes): No, advance care planning information given to patient to review.  Patient plans to discuss their wishes with loved ones or provider.      Social History     Tobacco Use     Smoking status: Never Smoker     Smokeless tobacco: Never Used   Substance Use Topics     Alcohol  use: Not Currently     Frequency: Never     Drinks per session: Patient refused     Binge frequency: Never     Comment: 1 time per week, 2 per time     If you drink alcohol do you typically have >3 drinks per day or >7 drinks per week? No    No flowsheet data found.    Reviewed orders with patient.  Reviewed health maintenance and updated orders accordingly - Yes  Labs reviewed in EPIC    Breast Cancer Screening:  Any new diagnosis of family breast, ovarian, or bowel cancer? No    FSH-7: No flowsheet data found.  click delete button to remove this line now  Mammogram Screening: Recommended annual mammography  Pertinent mammograms are reviewed under the imaging tab.    History of abnormal Pap smear: NO - age 30-65 PAP every 5 years with negative HPV co-testing recommended  PAP / HPV Latest Ref Rng & Units 10/25/2016 11/11/2013 8/14/2013   PAP - NIL ASC-US(A) NIL   HPV 16 DNA NEG Negative - -   HPV 18 DNA NEG Negative - -   OTHER HR HPV NEG Negative - -     Reviewed and updated as needed this visit by clinical staff  Tobacco  Allergies  Meds      Soc Hx        Reviewed and updated as needed this visit by Provider    Meds                 Review of Systems   Constitutional: Negative for chills and fever.   HENT: Negative for congestion, ear pain, hearing loss and sore throat.    Eyes: Negative for pain and visual disturbance.   Respiratory: Negative for cough and shortness of breath.    Cardiovascular: Negative for chest pain, palpitations and peripheral edema.   Gastrointestinal: Negative for abdominal pain, constipation, diarrhea, heartburn, hematochezia and nausea.   Breasts:  Negative for tenderness, breast mass and discharge.   Genitourinary: Negative for dysuria, frequency, genital sores, hematuria, pelvic pain, urgency, vaginal bleeding and vaginal discharge.   Musculoskeletal: Negative for arthralgias, joint swelling and myalgias.   Skin: Negative for rash.   Neurological: Negative for dizziness, weakness,  "headaches and paresthesias.   Psychiatric/Behavioral: Negative for mood changes. The patient is not nervous/anxious.           OBJECTIVE:   /80 (BP Location: Right arm, Patient Position: Chair, Cuff Size: Adult Large)   Pulse 65   Temp 98.2  F (36.8  C) (Oral)   Resp 16   Ht 1.675 m (5' 5.95\")   Wt 98.2 kg (216 lb 8 oz)   LMP 07/14/2020   SpO2 97%   BMI 35.00 kg/m    Physical Exam  GENERAL APPEARANCE: healthy, alert and no distress  EYES: Eyes grossly normal to inspection, PERRL and conjunctivae and sclerae normal  HENT: ear canals and TM's normal, nose and mouth without ulcers or lesions, oropharynx clear and oral mucous membranes moist  NECK: no adenopathy, no asymmetry, masses, or scars and thyroid normal to palpation  RESP: lungs clear to auscultation - no rales, rhonchi or wheezes  CV: regular rate and rhythm, normal S1 S2, no S3 or S4, no murmur, click or rub, no peripheral edema and peripheral pulses strong  ABDOMEN: soft, nontender, no hepatosplenomegaly, no masses and bowel sounds normal   (female): normal female external genitalia, normal urethral meatus, vaginal mucosal atrophy noted, normal cervix, adnexae, and uterus without masses or abnormal discharge  MS: no musculoskeletal defects are noted and gait is age appropriate without ataxia  SKIN: no suspicious lesions or rashes  NEURO: Normal strength and tone, sensory exam grossly normal, mentation intact and speech normal  PSYCH: mentation appears normal and affect normal/bright    Diagnostic Test Results:  Labs reviewed in Epic  none     ASSESSMENT/PLAN:   1. Routine general medical examination at a health care facility  - CBC with platelets and differential  - Comprehensive metabolic panel (BMP + Alb, Alk Phos, ALT, AST, Total. Bili, TP)  - TSH with free T4 reflex  - Hepatitis C Screen Reflex to HCV RNA Quant and Genotype  - CRP, inflammation  - ESR: Erythrocyte sedimentation rate  - Follicle stimulating hormone  - Lutropin  - Lipid " "panel reflex to direct LDL Fasting  - Hemoglobin A1c  - T3, Free  - T4 free  - Vitamin D Deficiency  - Homocysteine  - Magnesium  - Ferritin  - GGT  - Fibrinogen activity  - Thyroid stimulating immunoglobulin  - Anti thyroglobulin antibody  - Iron and iron binding capacity  - Reticulocyte count    2. Cervical cancer screening  - Pap imaged thin layer screen with HPV - recommended age 30 - 65 years (select HPV order below)  - HPV High Risk Types DNA Cervical    3. Encounter for routine laboratory testing  - CBC with platelets and differential  - Comprehensive metabolic panel (BMP + Alb, Alk Phos, ALT, AST, Total. Bili, TP)  - TSH with free T4 reflex  - Hepatitis C Screen Reflex to HCV RNA Quant and Genotype  - CRP, inflammation  - ESR: Erythrocyte sedimentation rate  - Follicle stimulating hormone  - Lutropin  - Lipid panel reflex to direct LDL Fasting  - Hemoglobin A1c  - T3, Free  - T4 free  - Vitamin D Deficiency  - Homocysteine  - Magnesium  - Ferritin  - GGT  - Fibrinogen activity  - Thyroid stimulating immunoglobulin  - Anti thyroglobulin antibody  - Iron and iron binding capacity  - Reticulocyte count    4. Colon cancer screening  - GASTROENTEROLOGY ADULT REF PROCEDURE ONLY; Future    5. Class 2 obesity due to excess calories without serious comorbidity with body mass index (BMI) of 35.0 to 35.9 in adult  - diet and lifestyle changes     6. Menopausal syndrome (hot flashes)  - seeing a functional physician for her symptoms.   - recommend trial of cool bedjet    Patient has been advised of split billing requirements and indicates understanding: Yes  COUNSELING:  Reviewed preventive health counseling, as reflected in patient instructions       Regular exercise       Healthy diet/nutrition       Colon cancer screening    Estimated body mass index is 35 kg/m  as calculated from the following:    Height as of this encounter: 1.675 m (5' 5.95\").    Weight as of this encounter: 98.2 kg (216 lb 8 oz).    Weight " management plan: Discussed healthy diet and exercise guidelines    She reports that she has never smoked. She has never used smokeless tobacco.      Counseling Resources:  ATP IV Guidelines  Pooled Cohorts Equation Calculator  Breast Cancer Risk Calculator  BRCA-Related Cancer Risk Assessment: FHS-7 Tool  FRAX Risk Assessment  ICSI Preventive Guidelines  Dietary Guidelines for Americans, 2010  USDA's MyPlate  ASA Prophylaxis  Lung CA Screening    Lupe Putnam MD  [facility]

## 2021-06-29 LAB
ALBUMIN SERPL-MCNC: 3.8 G/DL (ref 3.4–5)
ALP SERPL-CCNC: 61 U/L (ref 40–150)
ALT SERPL W P-5'-P-CCNC: 37 U/L (ref 0–50)
ANION GAP SERPL CALCULATED.3IONS-SCNC: 5 MMOL/L (ref 3–14)
AST SERPL W P-5'-P-CCNC: 27 U/L (ref 0–45)
BILIRUB SERPL-MCNC: 1.1 MG/DL (ref 0.2–1.3)
BUN SERPL-MCNC: 12 MG/DL (ref 7–30)
CALCIUM SERPL-MCNC: 8.9 MG/DL (ref 8.5–10.1)
CHLORIDE SERPL-SCNC: 110 MMOL/L (ref 94–109)
CHOLEST SERPL-MCNC: 202 MG/DL
CO2 SERPL-SCNC: 26 MMOL/L (ref 20–32)
CREAT SERPL-MCNC: 1.01 MG/DL (ref 0.52–1.04)
FERRITIN SERPL-MCNC: 164 NG/ML (ref 8–252)
GFR SERPL CREATININE-BSD FRML MDRD: 65 ML/MIN/{1.73_M2}
GGT SERPL-CCNC: 21 U/L (ref 0–40)
GLUCOSE SERPL-MCNC: 117 MG/DL (ref 70–99)
HCYS SERPL-SCNC: 12.1 UMOL/L (ref 4–12)
HDLC SERPL-MCNC: 49 MG/DL
IRON SATN MFR SERPL: 36 % (ref 15–46)
IRON SERPL-MCNC: 100 UG/DL (ref 35–180)
LDLC SERPL CALC-MCNC: 125 MG/DL
MAGNESIUM SERPL-MCNC: 2.1 MG/DL (ref 1.6–2.3)
NONHDLC SERPL-MCNC: 153 MG/DL
POTASSIUM SERPL-SCNC: 4.2 MMOL/L (ref 3.4–5.3)
PROT SERPL-MCNC: 6.8 G/DL (ref 6.8–8.8)
SODIUM SERPL-SCNC: 141 MMOL/L (ref 133–144)
T4 FREE SERPL-MCNC: 0.91 NG/DL (ref 0.76–1.46)
THYROGLOB AB SERPL IA-ACNC: <20 IU/ML (ref 0–40)
TIBC SERPL-MCNC: 279 UG/DL (ref 240–430)
TRIGL SERPL-MCNC: 140 MG/DL
TSH SERPL DL<=0.005 MIU/L-ACNC: 1.48 MU/L (ref 0.4–4)

## 2021-06-30 DIAGNOSIS — Z11.59 ENCOUNTER FOR SCREENING FOR OTHER VIRAL DISEASES: ICD-10-CM

## 2021-06-30 LAB
COPATH REPORT: NORMAL
PAP: NORMAL

## 2021-07-02 LAB
FINAL DIAGNOSIS: NORMAL
HPV HR 12 DNA CVX QL NAA+PROBE: NEGATIVE
HPV16 DNA SPEC QL NAA+PROBE: NEGATIVE
HPV18 DNA SPEC QL NAA+PROBE: NEGATIVE
SPECIMEN DESCRIPTION: NORMAL
SPECIMEN SOURCE CVX/VAG CYTO: NORMAL

## 2021-07-06 LAB — TSI SER-ACNC: <1 TSI INDEX

## 2021-07-12 ENCOUNTER — IMMUNIZATION (OUTPATIENT)
Dept: NURSING | Facility: CLINIC | Age: 51
End: 2021-07-12
Payer: COMMERCIAL

## 2021-07-12 PROCEDURE — 91300 PR COVID VAC PFIZER DIL RECON 30 MCG/0.3 ML IM: CPT

## 2021-07-12 PROCEDURE — 0001A PR COVID VAC PFIZER DIL RECON 30 MCG/0.3 ML IM: CPT

## 2021-07-14 ENCOUNTER — OFFICE VISIT (OUTPATIENT)
Dept: PODIATRY | Facility: CLINIC | Age: 51
End: 2021-07-14
Payer: COMMERCIAL

## 2021-07-14 VITALS
BODY MASS INDEX: 34.55 KG/M2 | WEIGHT: 215 LBS | SYSTOLIC BLOOD PRESSURE: 116 MMHG | HEIGHT: 66 IN | DIASTOLIC BLOOD PRESSURE: 80 MMHG

## 2021-07-14 DIAGNOSIS — M20.22 HALLUX RIGIDUS OF LEFT FOOT: ICD-10-CM

## 2021-07-14 DIAGNOSIS — R20.2 PARESTHESIA OF LEFT FOOT: ICD-10-CM

## 2021-07-14 DIAGNOSIS — M19.072 PRIMARY OSTEOARTHRITIS OF LEFT FOOT: Primary | ICD-10-CM

## 2021-07-14 PROCEDURE — 99214 OFFICE O/P EST MOD 30 MIN: CPT | Performed by: PODIATRIST

## 2021-07-14 RX ORDER — PROGESTERONE 100 MG/1
150 CAPSULE ORAL AT BEDTIME
COMMUNITY
Start: 2021-06-28

## 2021-07-14 ASSESSMENT — MIFFLIN-ST. JEOR: SCORE: 1611.19

## 2021-07-14 NOTE — PROGRESS NOTES
ASSESSMENT:  Encounter Diagnoses   Name Primary?     Primary osteoarthritis of left foot Yes     Hallux rigidus of left foot      Paresthesia of left foot      MEDICAL DECISION MAKING:  The cause and natural course of hallux limitus/1st metatarsophalangeal joint degenerative joint disease was discussed.  I explained it is a progressive problem. An informational handout was provided.      I reviewed the conservative cares that we went over in 2019.  She inquired about surgery.    We reviewed surgical intervention.  I explained that the goals of surgery are to reduce pain. We reviewed joint preserving procedures, as well as the more definitive first metatarsophalangeal joint arthrodesis.      I think some of her discomfort is related to the first dorsal digital nerve traveling over the prominent bony bump.  Removal of this bump might reduce symptoms, yet I explained that numbness can persist.    I offered either a simple cheilectomy to remove the osteophyte and bony spurring versus arthrodesis of the left first metatarsophalangeal joint.  I explained that the latter is more definitive.  The procedures and postoperative course for both were discussed in detail.    We discussed the surgical procedure and expected length of recovery.   Mira was asked to carefully consider surgery.  If it is scheduled, she will need to return for a more detailed discussion including the planned procedure(s), risks, benefits, post operative cares, course and prognosis.    At this time she is leaning towards the cheilectomy procedure, realizing that she may need additional surgery involving joint fusion in the future.  I explained that removing the bone spur might lead to some increased range of motion.  This this comes with the risk of increased pain in a severely arthritic joint.      Disclaimer: This note consists of symbols derived from keyboarding, dictation and/or voice recognition software. As a result, there may be errors in the  script that have gone undetected. Please consider this when interpreting information found in this chart.    Arturo Harris, SANJEEV, FACFAS, MS    [facility]le [facility]      ____________________________________________________________________    HPI:           Chief Complaint: arthritic left great toe  Onset of problem: 1-2 years  Painful and she is also noted some paresthesias around the first metatarsophalangeal joint  Pain is described as an ache.  Evaluated her in 2019 diagnosed her with hallux limitus/rigidus.  X-ray showed fairly advanced degenerative joint disease.  Conservative cares were reviewed as well as surgical options.    Today she returns inquiring about surgical options.  *  Past Medical History:   Diagnosis Date     ASCUS (atypical squamous cells of undetermined significance) on Pap smear 11/11/13    neg HPV. plan to repeat pap/HPV in 1 year     Heart palpitations 10/1998     LSIL (low grade squamous intraepithelial lesion) on Pap smear 10/12/11, 6/2012 11/11/11 colp - LSIL     Migraines Age 20's    Cluster     Obesity    *  *  Past Surgical History:   Procedure Laterality Date     EXCISE VULVA WIDE LOCAL  1/16/2013    Procedure: EXCISE VULVA WIDE LOCAL;  Vaginal wide local excision of the vulva.   Diagnosis: vulva chondyloma.;  Surgeon: Pretty Collado MD;  Location:  OR     SURGICAL HISTORY OF -   2/2008    Fibula Fx   *  *  Current Outpatient Medications   Medication Sig Dispense Refill     BIESTROGEN, 20-80, 1.25 MG COMPOUND Apply 1 mg topically daily 50/50 1MG/GRAM APPLY 2 PUMPS TOPICALLY ONCE A DAY       Black Cohosh 540 MG CAPS Take 1 tablet by mouth daily       brimonidine (ALPHAGAN-P) 0.15 % ophthalmic solution INSTILL 1 DROP INTO BOTH EYES TWICE DAILY AS DIRECTED       Elliot, Zingiber officinalis, (ELLIOT PO)        progesterone (PROMETRIUM) 100 MG capsule Take 100 mg by mouth At Bedtime       UNABLE TO FIND TAYLOR 1000 mg/day       UNABLE TO FIND  "Melatonin Blend 100 g theanine, 5 mg melatonin       UNABLE TO FIND C+ Quercitin 1000 mg/day       UNABLE TO FIND Dessicated Liver 3000 mg/day       UNABLE TO FIND Valerian Root 1590 mg/day       UNABLE TO FIND GIANNI-e 400 mg/day           EXAM:    Vitals: /80   Ht 1.675 m (5' 5.95\")   Wt 97.5 kg (215 lb)   BMI 34.75 kg/m    BMI: Body mass index is 34.75 kg/m .    Constitutional:  Mira Luis is in no apparent distress, appears well-nourished.  Cooperative with history and physical exam.    Vascular:  Pedal pulses are palpable bilaterally for both the DP and PT arteries.  CFT < 3 sec.  No edema.  Pedal hair growth noted.      Neuro:  Alert and oriented x 3. Coordinated gait.  Light touch sensation is intact to the L4, L5, S1 distributions. No obvious deficits.  No evidence of neurological-based weakness, spasticity, or contracture in the lower extremities.      Derm: Normal texture and turgor.  No erythema, ecchymosis, or cyanosis.  No open lesions.      Musculoskeletal:    Lower extremity muscle strength is normal.  Patient is ambulatory without an assistive device or brace .  No gross deformities.  Dorsal medial eminence, left 1st metatarsal head. Abnormal limitation of left hallux dorsiflexion with, and without, loading of the forefoot.         Radiographic Exam:       LEFT FOOT THREE OR MORE VIEWS   7/22/2019 10:50 AM      HISTORY: Right great MTP pain chronic. Pain of toe of left foot.     COMPARISON: None.                                                                      IMPRESSION: Advanced degenerative changes of the first MTP joint with  loss of joint space and subchondral sclerosis and subchondral cyst  formation. Otherwise unremarkable.     EDVIN PARK MD  "

## 2021-07-14 NOTE — PATIENT INSTRUCTIONS
"Thank you for choosing [facility] Podiatry / Foot & Ankle Surgery!    DR. OVALLES'S CLINIC LOCATIONS     [facility] SCHEDULE SURGERY: [phone]   [address] [place] [address] APPOINTMENTS: [phone]   [place] [address] BILLING QUESTIONS: [phone]  [phone] RADIOLOGY: [phone]       [facility]    [id number]    [place] [address]    [phone]  [phone]      Follow up: as needed    DEGENERATIVE ARTHRITIS OF THE BIG TOE JOINT   (hallux limitus/hallux rigidus)   Arthritis of the joint at the base of the big toe (metatarsophalangeal joint) has several causes. Usually it results from repetitive trauma to the joint, secondary to abnormal foot mechanics. Often it is hereditary. However, a one-time traumatic event can lead to arthritis. The condition doesn't improve with time, and even with treatment, can worsen. The cartilage wears out, joint surfaces are no longer smooth, bone rubs on bone, inflammation occurs with pain, and eventually bone spurs and loose fragments might develop.   The joint is often painful with activity, worse with flimsy shoes or walking barefoot, and it slowly progresses over time. A person might notice the toe \"locking up\" with walking. There often is an obvious, and irritating, bony bump on top of the foot. Shoes might be uncomfortable. In some people the pain is so bothersome that recreational activities sometimes even normal daily activities are difficult to perform.   The pain from this arthritis is likely a combination of joint jamming, cartilage loss and inflammation, and irritation from shoes rubbing on the bump. Sometimes other parts of the foot, leg, or back hurt from altering one's walk to compensate for the painful joint.     Ways to help a person live with the discomfort include wearing a good, supportive shoe with a rigid, rocker-type bottom. An example is a hiking boot. A rigid sole minimizes bending of the joint, and therefore, " joint motion and pain. Shoes with a high toe box allow for less rubbing on the bump. Avoiding barefoot walking, sandals, flip-flops and slippers usually helps.   Sometimes an insert or orthotic provides symptom relief. This might make shoe fit more difficult. Pads over the bump and occasionally injections into the joint provide relief.   Surgery for this condition is aimed towards alleviating pain. It does not cure the arthritis nor does it guarantee better joint motion. Depending on the condition of the metatarsophalangeal joint, there are several surgiqal options:    1.  Cutting off the bony bump(s) and cleaning the joint    2.  Loosening the joint up by making cuts in the first metatarsal bone or the big toe bone and removing a small section of bone.    3.  Repositioning bone to minimize jamming of the joint.    4.  In severe cases, the joint is fused. By fusing the joint, it will never bend again. This resolves the pain, because it's the movement of a worn out joint that causes pain. Oftentimes the operation involves a combination of these procedures and. requires the use of screws, pins, and/or a small surgical plate.     Healing after surgery requires about six weeks of protection. This allows the bone to heal. Maximum recovery takes about one year. The scar tissue and joint structures require this amount of time to finish the healing process. Expect stiffness, swelling and numbness during that time frame.   Surgery for arthritis of the metatarsophalangeal joint does involve side effects. Some side effects are predictable and others are less common but do occur. A scar will be visible and could be irritated by shoes. The shoe may rub on the screw or internal pin requiring surgical removal of these fixation devices. The screw and pin would likely be left in place for a full year. The first toe may remain stiff after surgery. The amount of stiffness is variable. Most people never regain normal motion of the  first toe. This is due to scar tissue inherent to any surgery, in addition to the cumUlative effects of arthritis. Sometimes the big toe drifts to one side or the other. Joint fusion is one option to correct an unstable, drifting toe. This procedure straightens the toe, however, no motion remains.   All surgical procedures involve risk of infection, numbness, pain, delayed bone healing, osteotomy (bone cut) dislocation, blood clots, continued foot pain, etc. Arthritic joint surgery is quite complex and should not be taken lightly.    Any skin incision can lead to infection. Deep infection might involve the bone and thus repeat surgery and six weeks of IV antibiotics. Scar tissue can cause nerve pain or numbness. his is generally temporary but can be permanent. We do not have treatments that cure nerve problems. Second toe pain could be related to altered mechanics and pressure transferred to the second toe. Delayed bone healing would lengthen the healing time. Some bones simply do not heal. This requires repeat surgery, electronic bone stimulation and/or extended protection. Smokers have an approximate 20% chance of poor bone healing. This is double that of a non-smoker. The bone cut may displace. This may need to be repaired with a second operation. Displacement can cause joint malalignment. Immobility after surgery can cause a blood clot in the legs and lungs. This could result in death.   Foot pain is complex. Most feet hurt for more than one reason. Operating on the arthritic   big toe joint will not necessarily create a pain free foot. Appropriate shoes, healthy body weight, avoidance of bare foot walking and moderation of activity will always be   necessary to enjoy foot comfort. Arthritis is incurable even with surgery.     Surgery for this type of arthritis is nevertheless quite successful. Most surgical patients are pleased with their foot following surgery. Many of the issues described above can be  "controlled by taking proper care of your foot during the healing process.   Cosmetic bump surgery is discouraged for the reasons listed above. A bump and joint that is comfortable when wearing appropriate shoes should simply be treated with appropriate shoes.   Your surgeon would be happy to fully describe any of the above issues. You should pursue a full understanding of the operation, recovery process and any potential problems that could develop.     FOOT & ANKLE SURGERY PLANNING CHECKLIST  If you have decided to have surgery, follow these 5 steps to get the procedure scheduled and to have the proper paperwork filled out. If you are unsure about surgery or would like to sit down and further discuss your issue and treatment options, please make an appointment.    1.  Pick the date that you would like to have surgery. Surgery is done on a Tuesday. Keep in mind that you will likely need at least 2 weeks off after the procedure for proper rest and healing.    2.  Call the surgery scheduling line at [phone] to get the procedure scheduled. Our  will also help you make your pre-operative physical with a primary doctor, your surgery consult appointment with me and your one week follow up after surgery for your first dressing change.    3.  If our surgery scheduler does not make your surgery consultation appointment with me then call to schedule that. When making the appointment, say \"I need to make a 30 minute surgical consult appointment\". It is recommended that you bring a spouse, family member or friend with you. There will be lots of information presented. It can be overwhelming and it is better to have someone there to help sort out the details.    4. Contact your employer to request time off from work if needed. If there is going to be FMLA used, please have them fax the forms to [phone] at least one week prior to surgery.    5. If you will need to be non-weight bearing after surgery and are " interested in a physical therapy referral to learn to safely get around on crutches or a knee scooter, please let us know.    * If you have any post-operative questions regarding your procedure, call our triage team at the [facility] at [phone] (option 3).

## 2021-07-14 NOTE — LETTER
7/14/2021         RE: Mira Luis  [address] [place] [address]        Dear Colleague,    Thank you for referring your patient, Mira Luis, to the [facility]. Please see a copy of my visit note below.    ASSESSMENT:  Encounter Diagnoses   Name Primary?     Primary osteoarthritis of left foot Yes     Hallux rigidus of left foot      Paresthesia of left foot      MEDICAL DECISION MAKING:  The cause and natural course of hallux limitus/1st metatarsophalangeal joint degenerative joint disease was discussed.  I explained it is a progressive problem. An informational handout was provided.      I reviewed the conservative cares that we went over in 2019.  She inquired about surgery.    We reviewed surgical intervention.  I explained that the goals of surgery are to reduce pain. We reviewed joint preserving procedures, as well as the more definitive first metatarsophalangeal joint arthrodesis.      I think some of her discomfort is related to the first dorsal digital nerve traveling over the prominent bony bump.  Removal of this bump might reduce symptoms, yet I explained that numbness can persist.    I offered either a simple cheilectomy to remove the osteophyte and bony spurring versus arthrodesis of the left first metatarsophalangeal joint.  I explained that the latter is more definitive.  The procedures and postoperative course for both were discussed in detail.    We discussed the surgical procedure and expected length of recovery.   Mira was asked to carefully consider surgery.  If it is scheduled, she will need to return for a more detailed discussion including the planned procedure(s), risks, benefits, post operative cares, course and prognosis.    At this time she is leaning towards the cheilectomy procedure, realizing that she may need additional surgery involving joint fusion in the future.  I explained that removing the bone spur might lead to some increased  range of motion.  This this comes with the risk of increased pain in a severely arthritic joint.      Disclaimer: This note consists of symbols derived from keyboarding, dictation and/or voice recognition software. As a result, there may be errors in the script that have gone undetected. Please consider this when interpreting information found in this chart.    Arturo Harris DPM, FACFAS, MS    [place] [facility] & Ankle Surgery      ____________________________________________________________________    HPI:           Chief Complaint: arthritic left great toe  Onset of problem: 1-2 years  Painful and she is also noted some paresthesias around the first metatarsophalangeal joint  Pain is described as an ache.  Evaluated her in 2019 diagnosed her with hallux limitus/rigidus.  X-ray showed fairly advanced degenerative joint disease.  Conservative cares were reviewed as well as surgical options.    Today she returns inquiring about surgical options.  *  Past Medical History:   Diagnosis Date     ASCUS (atypical squamous cells of undetermined significance) on Pap smear 11/11/13    neg HPV. plan to repeat pap/HPV in 1 year     Heart palpitations 10/1998     LSIL (low grade squamous intraepithelial lesion) on Pap smear 10/12/11, 6/2012 11/11/11 colp - LSIL     Migraines Age 20's    Cluster     Obesity    *  *  Past Surgical History:   Procedure Laterality Date     EXCISE VULVA WIDE LOCAL  1/16/2013    Procedure: EXCISE VULVA WIDE LOCAL;  Vaginal wide local excision of the vulva.   Diagnosis: vulva chondyloma.;  Surgeon: Pretty Collado MD;  Location: [facility] OR     SURGICAL HISTORY OF -   2/2008    Fibula Fx   *  *  Current Outpatient Medications   Medication Sig Dispense Refill     BIESTROGEN, 20-80, 1.25 MG COMPOUND Apply 1 mg topically daily 50/50 1MG/GRAM APPLY 2 PUMPS TOPICALLY ONCE A DAY       Black Cohosh 540 MG CAPS Take 1 tablet by mouth daily       brimonidine (ALPHAGAN-P) 0.15 % ophthalmic  "solution INSTILL 1 DROP INTO BOTH EYES TWICE DAILY AS DIRECTED       Elliot, Zingiber officinalis, (ELLIOT PO)        progesterone (PROMETRIUM) 100 MG capsule Take 100 mg by mouth At Bedtime       UNABLE TO FIND TAYLOR 1000 mg/day       UNABLE TO FIND Melatonin Blend 100 g theanine, 5 mg melatonin       UNABLE TO FIND C+ Quercitin 1000 mg/day       UNABLE TO FIND Dessicated Liver 3000 mg/day       UNABLE TO FIND Valerian Root 1590 mg/day       UNABLE TO FIND GIANNI-e 400 mg/day           EXAM:    Vitals: /80   Ht 1.675 m (5' 5.95\")   Wt 97.5 kg (215 lb)   BMI 34.75 kg/m    BMI: Body mass index is 34.75 kg/m .    Constitutional:  Mira Luis is in no apparent distress, appears well-nourished.  Cooperative with history and physical exam.    Vascular:  Pedal pulses are palpable bilaterally for both the DP and PT arteries.  CFT < 3 sec.  No edema.  Pedal hair growth noted.      Neuro:  Alert and oriented x 3. Coordinated gait.  Light touch sensation is intact to the L4, L5, S1 distributions. No obvious deficits.  No evidence of neurological-based weakness, spasticity, or contracture in the lower extremities.      Derm: Normal texture and turgor.  No erythema, ecchymosis, or cyanosis.  No open lesions.      Musculoskeletal:    Lower extremity muscle strength is normal.  Patient is ambulatory without an assistive device or brace .  No gross deformities.  Dorsal medial eminence, left 1st metatarsal head. Abnormal limitation of left hallux dorsiflexion with, and without, loading of the forefoot.         Radiographic Exam:       LEFT FOOT THREE OR MORE VIEWS   7/22/2019 10:50 AM      HISTORY: Right great MTP pain chronic. Pain of toe of left foot.     COMPARISON: None.                                                                      IMPRESSION: Advanced degenerative changes of the first MTP joint with  loss of joint space and subchondral sclerosis and subchondral cyst  formation. Otherwise unremarkable.     EDVIN" MD VIVIAN      Again, thank you for allowing me to participate in the care of your patient.        Sincerely,        Arturo Harris DPM

## 2021-07-29 ENCOUNTER — TELEPHONE (OUTPATIENT)
Dept: PODIATRY | Facility: CLINIC | Age: 51
End: 2021-07-29

## 2021-07-29 DIAGNOSIS — M25.572 PAIN IN JOINT OF LEFT FOOT: ICD-10-CM

## 2021-07-29 DIAGNOSIS — M19.072 ARTHRITIS OF FIRST METATARSOPHALANGEAL (MTP) JOINT OF LEFT FOOT: ICD-10-CM

## 2021-07-29 DIAGNOSIS — M20.22 HALLUX RIGIDUS OF LEFT FOOT: Primary | ICD-10-CM

## 2021-07-29 NOTE — TELEPHONE ENCOUNTER
Patient called with interest to schedule surgery. Please place surgery order.     Looking for September date.      Shari Rodriguez, Surgery Scheduler

## 2021-07-30 ENCOUNTER — TELEPHONE (OUTPATIENT)
Dept: PODIATRY | Facility: CLINIC | Age: 51
End: 2021-07-30

## 2021-07-30 PROBLEM — M25.572 PAIN IN JOINT OF LEFT FOOT: Status: ACTIVE | Noted: 2021-07-30

## 2021-07-30 PROBLEM — M19.072 ARTHRITIS OF FIRST METATARSOPHALANGEAL (MTP) JOINT OF LEFT FOOT: Status: ACTIVE | Noted: 2021-07-30

## 2021-07-30 PROBLEM — M20.22 HALLUX RIGIDUS OF LEFT FOOT: Status: ACTIVE | Noted: 2021-07-30

## 2021-07-30 NOTE — TELEPHONE ENCOUNTER
Scheduled surgery    ATC: please place covid order, reflect DOS     Type of surgery: left cheilectomy  Location of surgery: [facility]  Date and time of surgery: 10/5/21 @ 0730  Surgeon: jeffrey  Pre-Op Appt Date: 9/17/21  Post-Op Appt Date: 10/12/21   Packet sent out: Yes  Pre-cert/Authorization completed:  No  Date: 7/30/21      Shari Rodriguez, Surgery Scheduler

## 2021-08-01 DIAGNOSIS — Z11.59 ENCOUNTER FOR SCREENING FOR OTHER VIRAL DISEASES: ICD-10-CM

## 2021-08-02 ENCOUNTER — IMMUNIZATION (OUTPATIENT)
Dept: NURSING | Facility: CLINIC | Age: 51
End: 2021-08-02
Attending: PEDIATRICS
Payer: COMMERCIAL

## 2021-08-02 PROCEDURE — 91300 PR COVID VAC PFIZER DIL RECON 30 MCG/0.3 ML IM: CPT

## 2021-08-02 PROCEDURE — 0002A PR COVID VAC PFIZER DIL RECON 30 MCG/0.3 ML IM: CPT

## 2021-08-15 ENCOUNTER — LAB (OUTPATIENT)
Dept: URGENT CARE | Facility: URGENT CARE | Age: 51
End: 2021-08-15
Payer: COMMERCIAL

## 2021-08-15 DIAGNOSIS — Z11.59 ENCOUNTER FOR SCREENING FOR OTHER VIRAL DISEASES: ICD-10-CM

## 2021-08-15 LAB — SARS-COV-2 RNA RESP QL NAA+PROBE: NEGATIVE

## 2021-08-15 PROCEDURE — U0005 INFEC AGEN DETEC AMPLI PROBE: HCPCS

## 2021-08-15 PROCEDURE — U0003 INFECTIOUS AGENT DETECTION BY NUCLEIC ACID (DNA OR RNA); SEVERE ACUTE RESPIRATORY SYNDROME CORONAVIRUS 2 (SARS-COV-2) (CORONAVIRUS DISEASE [COVID-19]), AMPLIFIED PROBE TECHNIQUE, MAKING USE OF HIGH THROUGHPUT TECHNOLOGIES AS DESCRIBED BY CMS-2020-01-R: HCPCS

## 2021-08-19 ENCOUNTER — HOSPITAL ENCOUNTER (OUTPATIENT)
Facility: CLINIC | Age: 51
Discharge: HOME | End: 2021-08-19
Attending: INTERNAL MEDICINE | Admitting: INTERNAL MEDICINE
Payer: COMMERCIAL

## 2021-08-19 VITALS
WEIGHT: 200 LBS | HEIGHT: 66 IN | OXYGEN SATURATION: 96 % | DIASTOLIC BLOOD PRESSURE: 64 MMHG | BODY MASS INDEX: 32.14 KG/M2 | TEMPERATURE: 97.4 F | RESPIRATION RATE: 16 BRPM | HEART RATE: 60 BPM | SYSTOLIC BLOOD PRESSURE: 104 MMHG

## 2021-08-19 LAB — COLONOSCOPY: NORMAL

## 2021-08-19 PROCEDURE — G0500 MOD SEDAT ENDO SERVICE >5YRS: HCPCS | Performed by: INTERNAL MEDICINE

## 2021-08-19 PROCEDURE — 45378 DIAGNOSTIC COLONOSCOPY: CPT | Performed by: INTERNAL MEDICINE

## 2021-08-19 PROCEDURE — G0121 COLON CA SCRN NOT HI RSK IND: HCPCS | Performed by: INTERNAL MEDICINE

## 2021-08-19 PROCEDURE — 250N000011 HC RX IP 250 OP 636: Performed by: INTERNAL MEDICINE

## 2021-08-19 RX ORDER — LIDOCAINE 40 MG/G
CREAM TOPICAL
Status: DISCONTINUED | OUTPATIENT
Start: 2021-08-19 | End: 2021-08-19 | Stop reason: HOSPADM

## 2021-08-19 RX ORDER — ATROPINE SULFATE 0.4 MG/ML
0.4 AMPUL (ML) INJECTION
Status: DISCONTINUED | OUTPATIENT
Start: 2021-08-19 | End: 2021-08-19 | Stop reason: HOSPADM

## 2021-08-19 RX ORDER — SIMETHICONE 40MG/0.6ML
133 SUSPENSION, DROPS(FINAL DOSAGE FORM)(ML) ORAL
Status: DISCONTINUED | OUTPATIENT
Start: 2021-08-19 | End: 2021-08-19 | Stop reason: HOSPADM

## 2021-08-19 RX ORDER — NALOXONE HYDROCHLORIDE 0.4 MG/ML
0.4 INJECTION, SOLUTION INTRAMUSCULAR; INTRAVENOUS; SUBCUTANEOUS
Status: DISCONTINUED | OUTPATIENT
Start: 2021-08-19 | End: 2021-08-19 | Stop reason: HOSPADM

## 2021-08-19 RX ORDER — FENTANYL CITRATE 50 UG/ML
50-100 INJECTION, SOLUTION INTRAMUSCULAR; INTRAVENOUS
Status: COMPLETED | OUTPATIENT
Start: 2021-08-19 | End: 2021-08-19

## 2021-08-19 RX ORDER — FLUMAZENIL 0.1 MG/ML
0.2 INJECTION, SOLUTION INTRAVENOUS
Status: DISCONTINUED | OUTPATIENT
Start: 2021-08-19 | End: 2021-08-19 | Stop reason: HOSPADM

## 2021-08-19 RX ORDER — NALOXONE HYDROCHLORIDE 0.4 MG/ML
0.2 INJECTION, SOLUTION INTRAMUSCULAR; INTRAVENOUS; SUBCUTANEOUS
Status: DISCONTINUED | OUTPATIENT
Start: 2021-08-19 | End: 2021-08-19 | Stop reason: HOSPADM

## 2021-08-19 RX ORDER — ONDANSETRON 4 MG/1
4 TABLET, ORALLY DISINTEGRATING ORAL EVERY 6 HOURS PRN
Status: DISCONTINUED | OUTPATIENT
Start: 2021-08-19 | End: 2021-08-19 | Stop reason: HOSPADM

## 2021-08-19 RX ORDER — ONDANSETRON 2 MG/ML
4 INJECTION INTRAMUSCULAR; INTRAVENOUS
Status: DISCONTINUED | OUTPATIENT
Start: 2021-08-19 | End: 2021-08-19 | Stop reason: HOSPADM

## 2021-08-19 RX ORDER — FENTANYL CITRATE 50 UG/ML
25-50 INJECTION, SOLUTION INTRAMUSCULAR; INTRAVENOUS
Status: DISCONTINUED | OUTPATIENT
Start: 2021-08-19 | End: 2021-08-19 | Stop reason: HOSPADM

## 2021-08-19 RX ORDER — PROCHLORPERAZINE MALEATE 10 MG
10 TABLET ORAL EVERY 6 HOURS PRN
Status: DISCONTINUED | OUTPATIENT
Start: 2021-08-19 | End: 2021-08-19 | Stop reason: HOSPADM

## 2021-08-19 RX ORDER — ONDANSETRON 2 MG/ML
4 INJECTION INTRAMUSCULAR; INTRAVENOUS EVERY 6 HOURS PRN
Status: DISCONTINUED | OUTPATIENT
Start: 2021-08-19 | End: 2021-08-19 | Stop reason: HOSPADM

## 2021-08-19 RX ORDER — EPINEPHRINE 1 MG/ML
0.1 INJECTION, SOLUTION, CONCENTRATE INTRAVENOUS
Status: DISCONTINUED | OUTPATIENT
Start: 2021-08-19 | End: 2021-08-19 | Stop reason: HOSPADM

## 2021-08-19 RX ADMIN — MIDAZOLAM 2 MG: 1 INJECTION INTRAMUSCULAR; INTRAVENOUS at 07:56

## 2021-08-19 RX ADMIN — FENTANYL CITRATE 100 MCG: 50 INJECTION, SOLUTION INTRAMUSCULAR; INTRAVENOUS at 07:56

## 2021-08-19 ASSESSMENT — MIFFLIN-ST. JEOR: SCORE: 1543.94

## 2021-08-19 NOTE — H&P
Pre-Endoscopy History and Physical     Mira Luis MRN# [id number]   YOB: 1970 Age: 51 year old     Date of Procedure: 8/19/2021  Primary care provider: Leanne Valdez  Type of Endoscopy: Colonoscopy with possible biopsy, possible polypectomy  Reason for Procedure: screen  Type of Anesthesia Anticipated: Conscious Sedation    HPI:    Mira is a 51 year old female who will be undergoing the above procedure.      A history and physical has been performed. The patient's medications and allergies have been reviewed. The risks and benefits of the procedure and the sedation options and risks were discussed with the patient.  All questions were answered and informed consent was obtained.      She denies a personal or family history of anesthesia complications or bleeding disorders.     Patient Active Problem List   Diagnosis     Uterine fibroid     AGW (anogenital warts)     Fatigue     Obesity (BMI 30-39.9)     High vitamin D level     Vestibular migraine     Chronic daily headache     CKD (chronic kidney disease) stage 2, GFR 60-89 ml/min     Elevated bilirubin     Impaired fasting glucose     Reticulocytosis     Menopausal syndrome (hot flashes)     Hallux rigidus of left foot     Arthritis of first metatarsophalangeal (MTP) joint of left foot     Pain in joint of left foot        Past Medical History:   Diagnosis Date     Arthritis of first metatarsophalangeal (MTP) joint of left foot 7/30/2021     ASCUS (atypical squamous cells of undetermined significance) on Pap smear 11/11/13    neg HPV. plan to repeat pap/HPV in 1 year     Heart palpitations 10/1998     LSIL (low grade squamous intraepithelial lesion) on Pap smear 10/12/11, 6/2012 11/11/11 colp - LSIL     Migraines Age 20's    Cluster     Obesity         Past Surgical History:   Procedure Laterality Date     EXCISE VULVA WIDE LOCAL  1/16/2013    Procedure: EXCISE VULVA WIDE LOCAL;  Vaginal wide local excision of the vulva.   Diagnosis:  vulva chondyloma.;  Surgeon: Pretty Collado MD;  Location: [facility] OR     SURGICAL HISTORY OF -   2/2008    Fibula Fx       Social History     Tobacco Use     Smoking status: Never Smoker     Smokeless tobacco: Never Used   Substance Use Topics     Alcohol use: Not Currently     Comment: 1 time per week, 2 per time       Family History   Problem Relation Age of Onset     Ulcerative Colitis Mother      Arrhythmia Mother      Hypertension Maternal Grandmother      Cancer Maternal Aunt      Cancer Paternal Aunt      Diabetes No family hx of      Cerebrovascular Disease No family hx of      Thyroid Disease No family hx of      Glaucoma No family hx of      Macular Degeneration No family hx of        Prior to Admission medications    Medication Sig Start Date End Date Taking? Authorizing Provider   BIESTROGEN, 20-80, 1.25 MG COMPOUND Apply 1 mg topically daily 50/50 1MG/GRAM APPLY 2 PUMPS TOPICALLY ONCE A DAY 6/28/21   Reported, Patient   Black Cohosh 540 MG CAPS Take 1 tablet by mouth daily 6/9/21   Reported, Patient   brimonidine (ALPHAGAN-P) 0.15 % ophthalmic solution INSTILL 1 DROP INTO BOTH EYES TWICE DAILY AS DIRECTED 8/9/20   Reported, Patient   Elliot, Zingiber officinalis, (ELLIOT PO)     Reported, Patient   progesterone (PROMETRIUM) 100 MG capsule Take 100 mg by mouth At Bedtime 6/28/21   Reported, Patient   UNABLE TO FIND TAYLOR 1000 mg/day 6/9/21   Reported, Patient   UNABLE TO FIND Melatonin Blend 100 g theanine, 5 mg melatonin 6/9/21   Reported, Patient   UNABLE TO FIND C+ Quercitin 1000 mg/day 6/9/21   Reported, Patient   UNABLE TO FIND Dessicated Liver 3000 mg/day 6/9/21   Reported, Patient   UNABLE TO FIND Valerian Root 1590 mg/day 6/9/21   Reported, Patient   UNABLE TO FIND GIANNI-e 400 mg/day 6/9/21   Reported, Patient       Allergies   Allergen Reactions     Shellfish Allergy      Throat closed     Sulfa Drugs         REVIEW OF SYSTEMS:   5 point ROS negative except as noted above in HPI, including Gen.,  "Resp., CV, GI &  system review.    PHYSICAL EXAM:   There were no vitals taken for this visit. Estimated body mass index is 34.75 kg/m  as calculated from the following:    Height as of 7/14/21: 1.675 m (5' 5.95\").    Weight as of 7/14/21: 97.5 kg (215 lb).   GENERAL APPEARANCE: alert, and oriented  MENTAL STATUS: alert  AIRWAY EXAM: Mallampatti Class I (visualization of the soft palate, fauces, uvula, anterior and posterior pillars)  RESP: lungs clear to auscultation - no rales, rhonchi or wheezes  CV: regular rates and rhythm  DIAGNOSTICS:    Not indicated    IMPRESSION   ASA Class 2 - Mild systemic disease    PLAN:   Plan for Colonoscopy with possible biopsy, possible polypectomy. We discussed the risks, benefits and alternatives and the patient wished to proceed.    The above has been forwarded to the consulting provider.      Signed Electronically by: Corwin Remy MD  August 19, 2021          "

## 2021-08-19 NOTE — LETTER
August 4, 2021      Mira Luis  [address] [place] [address]        Dear Mira,     Please be aware that coverage of these services is subject to the terms and limitations of your health insurance plan.  Call member services at your health plan with any benefit or coverage questions.    Thank you for choosing [facility]. You are scheduled for the following service(s):    Date:  8/19/2021 Thursday             Procedure:  COLONOSCOPY  Doctor:        Dr. Remy   Arrival Time:  7:30 am *Enter and check in at the Main Hospital Entrance*  Procedure Time:  8:00 am      Location:   [facility]        Endoscopy Department, First Floor         [address] [place], [place] [address] [phone] or [phone] ([facility]) to reschedule        MIRALAX -GATORADE  PREP  Colonoscopy is the most accurate test to detect colon polyps and colon cancer; and the only test where polyps can be removed. During this procedure, a doctor examines the lining of your large intestine and rectum through a flexible tube.   Transportation  You must arrange for a ride for the day of your procedure with a responsible adult. A taxi , Uber, etc, is not an option unless you are accompanied by a responsible adult. If you fail to arrange transportation with a responsible adult, your procedure will be cancelled and rescheduled.    Purchase the  following supplies at your local pharmacy:  - 2 (two) bisacodyl tablets: each tablet contains 5 mg.  (Dulcolax  laxative NOT Dulcolax  stool softener)   - 1 (one) 8.3 oz bottle of Polyethylene Glycol (PEG) 3350 Powder   (MiraLAX , Smooth LAX , ClearLAX  or equivalent)  - 64 oz Gatorade    Regular Gatorade, Gatorade G2 , Powerade , Powerade Zero  or Pedialyte  is acceptable. Red colored flavors are not allowed; all other colors (yellow, green, orange, purple and blue) are okay. It is also okay to buy two 2.12 oz packets of powdered  Gatorade that can be mixed with water to a total volume of 64 oz of liquid.  - 1 (one) 10 oz bottle of Magnesium Citrate (Red colored flavors are not allowed)  It is also okay for you to use a 0.5 oz package of powdered magnesium citrate (17 g) mixed with 10 oz of water.      PREPARATION FOR COLONOSCOPY    7 days before:    Discontinue fiber supplements and medications containing iron. This includes Metamucil  and Fibercon ; and multivitamins with iron.    3 days before:    Begin a low-fiber diet. A low-fiber diet helps making the cleanout more effective.     Examples of a low-fiber diet include (but are not limited to): white bread, white rice, pasta, crackers, fish, chicken, eggs, ground beef, creamy peanut butter, cooked/steamed/boiled vegetables, canned fruit, bananas, melons, milk, plain yogurt cheese, salad dressing and other condiments.     The following are not allowed on a low-fiber diet: seeds, nuts, popcorn, bran, whole wheat, corn, quinoa, raw fruits and vegetables, berries and dried fruit, beans and lentils.    For additional details on low-fiber diet, please refer to the table on the last page.    2 days before:    Continue the low-fiber diet.     Drink at least 8 glasses of water throughout the day.     Stop eating solid foods at 11:45 pm.    1 day before:    In the morning: begin a clear liquid diet (liquids you can see through).     Examples of a clear liquid diet include: water, clear broth or bouillon, Gatorade, Pedialyte or Powerade, carbonated and non-carbonated soft drinks (Sprite , 7-Up , ginger ale), strained fruit juices without pulp (apple, white grape, white cranberry), Jell-O  and popsicles.     The following are not allowed on a clear liquid diet: red liquids, alcoholic beverages, dairy products (milk, creamer, and yogurt), protein shakes, creamy broths, juice with pulp and chewing tobacco.    At noon: take 2 (two) bisacodyl tablets     At 4 (and no later than 6pm): start drinking the  Miralax-Gatorade preparation (8.3 oz of Miralax mixed with 64 oz of Gatorade in a large pitcher). Drink 1(one) 8 oz glass every 15 minutes thereafter, until the mixture is gone.    COLON CLEANSING TIPS: drink adequate amounts of fluids before and after your colon cleansing to prevent dehydration. Stay near a toilet because you will have diarrhea. Even if you are sitting on the toilet, continue to drink the cleansing solution every 15 minutes. If you feel nauseous or vomit, rinse your mouth with water, take a 15 to 30-minute-break and then continue drinking the solution. You will be uncomfortable until the stool has flushed from your colon (in about 2 to 4 hours). You may feel chilled.    Day of your procedure  You may take all of your morning medications including blood pressure medications, blood thinners (if you have not been instructed to stop these by our office), methadone, anti-seizure medications with sips of water 3 hours prior to your procedure or earlier. Do not take insulin or vitamins prior to your procedure. Continue the clear liquid diet.   4 hours prior: drink 10 oz of magnesium citrate. It may be easier to drink it with a straw.    STOP consuming all liquids after that.     Do not take anything by mouth during this time.     Allow extra time to travel to your procedure as you may need to stop and use a restroom along the way.    You are ready for the procedure, if you followed all instructions and your stool is no longer formed, but clear or yellow liquid. If you are unsure whether your colon is clean, please call our office at [phone] before you leave for your appointment.    Bring the following to your procedure:  - Insurance Card/Photo ID.   - List of current medications including over-the-counter medications and supplements.   - Your rescue inhaler if you currently use one to control asthma.    Canceling or rescheduling your appointment:   If you must cancel or reschedule your appointment,  please call [phone] as soon as possible.      COLONOSCOPY PRE-PROCEDURE CHECKLIST    If you have diabetes, ask your regular doctor for diet and medication restrictions.  If you take an anticoagulant or anti-platelet medication (such as Coumadin , Lovenox , Pradaxa , Xarelto , Eliquis , etc.), please call your primary doctor for advice on holding this medication.  If you take aspirin you may continue to do so.  If you are or may be pregnant, please discuss the risks and benefits of this procedure with your doctor.        What happens during a colonoscopy?    Plan to spend up to two hours, starting at registration time, at the endoscopy center the day of your procedure. The colonoscopy takes an average of 15 to 30 minutes. Recovery time is about 30 minutes.      Before the exam:    You will change into a gown.    Your medical history and medication list will be reviewed with you, unless that has been done over the phone prior to the procedure.     A nurse will insert an intravenous (IV) line into your hand or arm.    The doctor will meet with you and will give you a consent form to sign.  During the exam:     Medicine will be given through the IV line to help you relax.     Your heart rate and oxygen levels will be monitored. If your blood pressure is low, you may be given fluids through the IV line.     The doctor will insert a flexible hollow tube, called a colonoscope, into your rectum. The scope will be advanced slowly through the large intestine (colon).    You may have a feeling of fullness or pressure.     If an abnormal tissue or a polyp is found, the doctor may remove it through the endoscope for closer examination, or biopsy. Tissue removal is painless    After the exam:           Any tissue samples removed during the exam will be sent to a lab for evaluation. It may take 5-7 working days for you to be notified of the results.     A nurse will provide you with complete discharge instructions before you  leave the endoscopy center. Be sure to ask the nurse for specific instructions if you take blood thinners such as Aspirin, Coumadin or Plavix.     The doctor will prepare a full report for you and for the physician who referred you for the procedure.     Your doctor will talk with you about the initial results of your exam.      Medication given during the exam will prohibit you from driving for the rest of the day.     Following the exam, you may resume your normal diet. Your first meal should be light, no greasy foods. Avoid alcohol until the next day.     You may resume your regular activities the day after the procedure.         LOW-FIBER DIET    Foods RECOMMENDED Foods to AVOID   Breads, Cereal, Rice and Pasta:   White bread, rolls, biscuits, croissant and kyle toast.   Waffles, Cameroonian toast and pancakes.   White rice, noodles, pasta, macaroni and peeled cooked potatoes.   Plain crackers and saltines.   Cooked cereals: farina, cream of rice.   Cold cereals: Puffed Rice , Rice Krispies , Corn Flakes  and Special K    Breads, Cereal, Rice and Pasta:   Breads or rolls with nuts, seeds or fruit.   Whole wheat, pumpernickel, rye breads and cornbread.   Potatoes with skin, brown or wild rice, and kasha (buckwheat).     Vegetables:   Tender cooked and canned vegetables without seeds: carrots, asparagus tips, green or wax beans, pumpkin, spinach, lima beans. Vegetables:   Raw or steamed vegetables.   Vegetables with seeds.   Sauerkraut.   Winter squash, peas, broccoli, Brussel sprouts, cabbage, onions, cauliflower, baked beans, peas and corn.   Fruits:   Strained fruit juice.   Canned fruit, except pineapple.   Ripe bananas and melon. Fruits:   Prunes and prune juice.   Raw fruits.   Dried fruits: figs, dates and raisins.   Milk/Dairy:   Milk: plain or flavored.   Yogurt, custard and ice cream.   Cheese and cottage cheese Milk/Dairy:     Meat and other proteins:   ground, well-cooked tender beef, lamb, ham, veal,  pork, fish, poultry and organ meats.   Eggs.   Peanut butter without nuts. Meat and other proteins:   Tough, fibrous meats with gristle.   Dry beans, peas and lentils.   Peanut butter with nuts.   Tofu.   Fats, Snack, Sweets, Condiments and Beverages:   Margarine, butter, oils, mayonnaise, sour cream and salad dressing, plain gravy.   Sugar, hard candy, clear jelly, honey and syrup.   Spices, cooked herbs, bouillon, broth and soups made with allowed vegetable, ketchup and mustard.   Coffee, tea and carbonated drinks.   Plain cakes, cookies and pretzels.   Gelatin, plain puddings, custard, ice cream, sherbet and popsicles. Fats, Snack, Sweets, Condiments and Beverages:   Nuts, seeds and coconut.   Jam, marmalade and preserves.   Pickles, olives, relish and horseradish.   All desserts containing nuts, seeds, dried fruit and coconut; or made from whole grains or bran.   Candy made with nuts or seeds.   Popcorn.     DIRECTIONS TO THE ENDOSCOPY DEPARTMENT    From the north ([facility])  Take [address] South, exit on [address] [address]. Get into the left hand helen, turn left (east), go one-half mile to [address] and turn left. Go north to the second stoplight, take a right on [address] and follow it to the Main Hospital entrance.    From the south ([facility])  Take 35N to the [address]E split and exit on [address] [address]. On [address] [address], turn left (west) to [address]. Turn right (north) on [address]. Go north to the second stoplight, take a right on [address] and follow it to the Main Hospital entrance.    From the east via 35E ([facility])  Take [address] south to [address] [address] exit. Turn right on [address] [address]. Go west to [address]. Turn right (north) on [address]. Go to the second stoplight, take a right on [address] to the Main Hospital entrance.    From the east via [address] ([facility])  Take [address] to [address].  Turn left (south) on [address] to [address], turn left (east) on [address] and follow it to the Main Hospital entrance.    From the west via [address] ([place])  Take [address] east to [address]. Turn right (south) on [address] to [address], turn left (east) on [address] and follow it to the Main Hospital entrance.

## 2021-08-19 NOTE — DISCHARGE INSTRUCTIONS

## 2021-09-10 ENCOUNTER — ANCILLARY PROCEDURE (OUTPATIENT)
Dept: GENERAL RADIOLOGY | Facility: CLINIC | Age: 51
End: 2021-09-10
Attending: PODIATRIST
Payer: COMMERCIAL

## 2021-09-10 ENCOUNTER — OFFICE VISIT (OUTPATIENT)
Dept: PODIATRY | Facility: CLINIC | Age: 51
End: 2021-09-10
Payer: COMMERCIAL

## 2021-09-10 VITALS
BODY MASS INDEX: 34.78 KG/M2 | DIASTOLIC BLOOD PRESSURE: 70 MMHG | SYSTOLIC BLOOD PRESSURE: 110 MMHG | HEIGHT: 66 IN | WEIGHT: 216.4 LBS

## 2021-09-10 DIAGNOSIS — M19.079 ARTHRITIS OF FIRST METATARSOPHALANGEAL (MTP) JOINT: ICD-10-CM

## 2021-09-10 DIAGNOSIS — M20.22 HALLUX RIGIDUS OF LEFT FOOT: ICD-10-CM

## 2021-09-10 DIAGNOSIS — M25.572 PAIN IN JOINT OF LEFT FOOT: Primary | ICD-10-CM

## 2021-09-10 PROCEDURE — 73630 X-RAY EXAM OF FOOT: CPT | Mod: LT | Performed by: RADIOLOGY

## 2021-09-10 PROCEDURE — 99214 OFFICE O/P EST MOD 30 MIN: CPT | Performed by: PODIATRIST

## 2021-09-10 ASSESSMENT — MIFFLIN-ST. JEOR: SCORE: 1618.33

## 2021-09-10 NOTE — PATIENT INSTRUCTIONS
Thank you for choosing [facility] Podiatry / Foot & Ankle Surgery!    DR. OVALLES'S CLINIC LOCATIONS     [facility] SCHEDULE SURGERY: [phone]   [address] [place] [address] APPOINTMENTS: [phone]   [place] [address] BILLING QUESTIONS: [phone]  [phone] RADIOLOGY: [phone]       [place]    [id number]    [place] [address]    [phone]  [phone]        REVIEW OF SURGICAL RISKS  The following is a list of possible risks associated with foot and ankle surgery. This is not all-inclusive. Please realize that risks associated with elective foot surgery are low and there are ways to deal with them when they occur.     1) Infection:  Probably the most concerning and discussed risk of surgery, the chance of infection is about 1% with elective surgery. Often it involves the skin around the incision and resolves with oral antibiotics. It is rare that infection will be deep and require surgical intervention. You will receive an antibiotic prior to surgery.    2)  Pain: Surgery is trauma to the foot. Therefore a certain amount of pain is to be expected. This will be most bothersome during the first 1-2 days. Taking your pain medication, elevating the extremity above heart level, and using ice are all very important for adequate pain management.     3)  Swelling: This is due to the trauma of surgery. A certain degree of swelling is normal. However, if there is too much, it can impair healing, increase the risk of infection, add to your pain, and linger for several months after surgery making return to normal shoes difficult. Elevating the limb is extremely important.    4)  Healing Complications: There are many factors that can impair healing. There is no way to speed up the biological rate of healing. People tend to find ways to slow it down. Proper nutrition, not smoking, following the instructions provided by your surgeon are ways to help with normal  healing.    Sometimes the skin is slow to heal, and an open area along the incision develops.  If this happens, it cannot be stitched closed. It then needs to heal from the inside out. Rarely there will be an issue with bone healing, which might require a special device called a bone stimulator and/or a revision surgery.    5)  Temporary and Permanent Numbness: Numbness beyond the area of surgery is to be expected. Initially it is from the local anesthetic that was injected into your foot. This wears off within 24 hours. Continued numbness or tingling is usually from swelling that compresses the nerves. Numbness that lasts for weeks is from nerve injury/irritation. This might eventually resolve or be permanent.      6)  Blood Clot:  Although rare, this is potentially the most dangerous risk associated with foot surgery. A blood clot in the leg can lead to varicose veins and chronic swelling. A blood clot that travels to the lungs is a very serious condition requiring hospitalization. Increased risk is related to trauma of surgery and degree of immobilization. There are many other risk factors that are not related directly to surgery (smoking, family history, personal history of varicose veins and/or blood clots, cancer, high fat cholesterol and lipids). Your surgeon will discuss this with you and devise an appropriate plan to help prevent this complication.    7)  Hypertrophic Scar: Some people have skin that is prone to forming exaggerated scar tissue. This can be unsightly and uncomfortable. There are ways to treat it.        8)  Complex Regional Pain Syndrome:  Rarely some people have pain that is out of proportion to the surgical procedure and harder to get control of. This pain can linger and cause changes in skin temperature and appearance. If this occurs, physical therapy and/or a pain specialist might be needed.      9) There are also intra-operative challenges and complications that can occur.    "Intra-operative challenges can involve finding poor bone quality, difficulty with the internal fixation (when used), and even inadvertent injury to neighboring structures that would then need to be repaired.     Please remember that surgical complications are rare. Your surgeon has a plan to deal with them, should one occur. The primary goal of surgery is pain reduction. There are no guarantees. An \"abnormal\" foot prior to surgery, is not necessarily a \"normal\" foot afterwards. Hopefully it is a less painful one.      If you have any questions, please discuss with Dr. Harris prior to surgery.        POST-OPERATIVE CARE RECOMENDATIONS    ACTIVITY:    1)  Weight bearing status: __________________________    2)  Keep your surgical lower extremity elevated 23/24 hours above heart level. You will want to keep your foot elevated as much as possible for the first week after surgery.     3)  It is recommended that you get up and move around (walk, crutch, roll) for short periods each hour while you are awake. It is okay to wiggle your toes. If you are not in a splint or cast, move your ankle joint. Motion helps lower risk of a blood clot in your leg.      4)  If you bathe or shower, the dressing must be kept dry. Safety is a concern and sponge bathing might be best. You can purchase a water proof cast protector.     5)  You are not to drive while you are taking pain medications. No driving, if surgery was done on the right foot/ ankle or if you drive a stick shift.     SPECIFIC CARES:    1)  Keep the dressing clean, dry, and intact. If your dressing gets wet, comes apart, or falls off, please call your clinic and notify Dr. Harris. Some bleeding through the dressing is okay. But if it causes a spot bigger than 2 inches in diameter, please notify Dr. Harris.     2)  Place an ice pack behind the knee of the surgical side for up to 20min/hour. It can also be placed on the front of the ankle.     4)  Call your clinic if you " experience calf pain, chest pain, or problems breathing.    5)  Call your clinic if fever, increasing pain that you can't control or a large amount of bleeding.      6) What to do if your pain seems out of control:   1)  Make sure you are truly elevating the foot/ankle above heart level.   2)  Make sure you are using a cold pack/ ice   3)  Check the pain medication instructions:     Usually you can take two pain pills every four hours, if needed.   4)  If the above are not adequate, then you need to remove the brace/ boot and   remove the compression wrap. The wrap might be too tight. After you do   this, elevate the foot for an hour and then re-wrap the foot lightly and   replace the splint / boot.      Follow up in clinic one week after surgery. This appointment should already be set up.      MEDICATIONS:    IMPORTANT:  Take your pain medication when you get home, even if you are not having pain. You want it in your system before the local anesthetic (foot numbness from the shot) wears off.      1)  Take your pain medication with food. This will help prevent any nausea side effects.  If hydroxyzine was prescribed, it is for itching, leg spasms, and makes the other pain medication work better.    2)  Anti blood clot plan: To help prevent a blood clot in your legs, it is important that you wiggle your toes and ankles frequently. Obviously, this might be limited on the surgical side. Get up and move around a few minutes every hour while you are awake.  Keep the white sock on the non-surgical side and the compression wraps to the knee on the surgical side. If Dr. Harris thinks that you are at high risk for a blood clot, he might put you on a blood thinner called enoxaparin.    Please call the clinic if you have any pain or swelling in either calf.        SHOWERING BEFORE SURGERY  You must wash with the soap (Hibiclens - 4% CHG) twice before coming to the hospital for your surgery. This will decrease bacteria (germs) on  your skin. It will also help reduce your chance of infection (illness caused by germs) after surgery.  Read the directions and safety tips on the bottle of soap. Wash once the evening before surgery and once the morning of surgery. Use 4 ounces of soap each time. When showering, it is best to use two fresh washcloths and a fresh towel.   Items you will need for showerin newly washed washcloths    2 newly washed towels    8 ounces of one of the soap  FOLLOW THESE INSTRUCTIONS:  The evening before surgery   1. Shower or bathe as you normally would, use your regular soap and a clean washcloth. Give special attention to places where your incision (surgical cut) or catheters will be. This includes your groin area. Rinse well. You may wash your hair with your regular shampoo.  2. Next, wash your entire body from your chin down with the antiseptic soap. See the next page for directions about how to do this.  3. Rinse well and dry off using a newly washed towel.  The morning of surgery  Repeat steps 1, 2 and 3.   Other suggestions:    Wear freshly washed pajamas or clothing after your evening shower.    Wear freshly washed clothes the day of surgery.    Wash and change your bed sheets the day before surgery to have clean bed sheets after you shower and when you get home from surgery.    If you have trouble washing all areas, make sure someone helps you.    Don t use any deodorant, lotion or powder after your shower.    Women who are menstruating should wear a fresh sanitary pad to the hospital.       **If you have questions or concerns after surgery, please call: Podiatry/Foot & Ankle Surgery Triage Team at the [facility] at [phone] (option 3 for triage).

## 2021-09-10 NOTE — LETTER
9/10/2021         RE: Mira Luis  [address] [place] [address]        Dear Colleague,    Thank you for referring your patient, Mira Luis, to the [facility] PODIATRY. Please see a copy of my visit note below.    PRE-OP QUESTIONNAIRE:    1)  Do you smoke?  no    2)  Have you ever had a blood clot in your legs or lungs?  no    3)  Any family history of blood clots or bleeding disorders?  no    4)  Do you have an allergy or intolerance to any pain medication? no    5)  Do you have a history of any dependency on pain medications? no    6)  Does another doctor treat you for chronic pain or prescribe pain medication?  no    7)  Do you have help at home (family/friends) to assist after surgery?  yes    8)  Have you ever had complications after surgery? no      9)  Do you have problems with balance?  sometimes    10) Any allergy to metal or jewelry?  no      11) Any allergy to suture material? yes in 2014    12) How much time off from work are you allowed or planning for?  will talk about it    Subjective:    Mira Luis is a 51 year old female who presents to clinic today for surgical planning and discussion.  She is scheduled to undergo left foot surgery on 10/5/21: Cheilectomy of left foot.  She has degenerative joint disease of the left first metatarsophalangeal joint with prolific dorsal spurring.  I last evaluated her on 7/14/2021.  Conservative cares and surgical options were reviewed.  She was asked to carefully consider the surgical options.  Please see the clinic note for details. She returns to clinic today having decided to proceed with surgery.    Please see the surgical planning questionnaire below.     Patient Active Problem List   Diagnosis     Uterine fibroid     AGW (anogenital warts)     Fatigue     Obesity (BMI 30-39.9)     High vitamin D level     Vestibular migraine     Chronic daily headache     CKD (chronic kidney disease) stage 2, GFR 60-89 ml/min      Elevated bilirubin     Impaired fasting glucose     Reticulocytosis     Menopausal syndrome (hot flashes)     Hallux rigidus of left foot     Arthritis of first metatarsophalangeal (MTP) joint of left foot     Pain in joint of left foot       Current Outpatient Medications   Medication     BIESTROGEN, 20-80, 1.25 MG COMPOUND     Black Cohosh 540 MG CAPS     brimonidine (ALPHAGAN-P) 0.15 % ophthalmic solution     [place], Zingiber officinalis, (MADISON PO)     progesterone (PROMETRIUM) 100 MG capsule     UNABLE TO FIND     UNABLE TO FIND     UNABLE TO FIND     UNABLE TO FIND     UNABLE TO FIND     UNABLE TO FIND     No current facility-administered medications for this visit.        Objective:   Vascular:  Pedal pulses are palpable bilaterally for both the DP and PT arteries.  CFT < 3 sec.  No edema.  Pedal hair growth noted.      Neuro:  Alert and oriented x 3. Coordinated gait.  Light touch sensation is intact to the L4, L5, S1 distributions. No obvious deficits.  No evidence of neurological-based weakness, spasticity, or contracture in the lower extremities.      Derm: Normal texture and turgor.  No erythema, ecchymosis, or cyanosis.  No open lesions.      Musculoskeletal:    Lower extremity muscle strength is normal.  Patient is ambulatory without an assistive device or brace .  No gross deformities.  Dorsal medial eminence, left 1st metatarsal head. Abnormal limitation of left hallux dorsiflexion with, and without, loading of the forefoot.         Radiographic Exam:      Assessment:  FOOT LEFT THREE OR MORE VIEWS   9/10/2021 9:40 AM      HISTORY: Left 1st metatarsophalangeal joint pain with degenerative  joint disease; surgical planning. Pain in joint of left foot.  Arthritis of first metatarsophalangeal (MTP) joint. Hallux rigidus of  left foot.     COMPARISON: 7/22/2019 x-ray.                                                                      IMPRESSION: Advanced first MTP degenerative changes with bulbous  first  metatarsal head. Otherwise unremarkable. Mild progression since the  comparison study.    Plan:  The surgical procedure was discussed in detail, including risks, benefits, post operative course and cares, and prognosis.  Risks discussed include, but are not limited to,  postoperative pain, swelling,  infection, healing complications, temporary or permanent numbness,  DVT, hypertropohic scar formation, complex regional pain syndrome and need for additional surgery.       I also personally reviewed the x-ray images with her.  I showed her the advanced degenerative changes.  I reminded her that fusion of this joint is an option.  She considered giving me the option of making a intraoperative decision.  Ultimately after discussion, she elected to proceed with the cheilectomy.  She will consider fusion later in life, should the cheilectomy not adequately relieve pain.  I explained that the arthritis is not cured by this surgical procedure.  The goal is to reduce her pain.  I also explained that increased range of motion that can sometimes occur after removal of the spur can lead to additional pain.    I explained that although infrequent, there are intra-operative and post-operative challenges and complications that can occur.  Some of the latter are listed above.  Intra-operative challenges can involve finding poor bone quality, difficulty with the internal fixation (when used), and even inadvertent injury to neighboring structures that would then need to be repaired.     No guarantees were given.     The patient was also informed that a  might be present on the day of surgery.     Total time spent on this patient's care, date of service 9/10/2021, was 30 minutes.  This included physical exam of her left foot, acquisition of x-rays and personal review of the x-rays, review of surgical risks the procedures and the postoperative course.    Arturo Harris DPM, FACFAS, MS    [facility] Department of  Podiatry/Foot & Ankle Surgery        Again, thank you for allowing me to participate in the care of your patient.        Sincerely,        Arturo Harris DPM

## 2021-09-10 NOTE — PROGRESS NOTES
PRE-OP QUESTIONNAIRE:    1)  Do you smoke?  no    2)  Have you ever had a blood clot in your legs or lungs?  no    3)  Any family history of blood clots or bleeding disorders?  no    4)  Do you have an allergy or intolerance to any pain medication? no    5)  Do you have a history of any dependency on pain medications? no    6)  Does another doctor treat you for chronic pain or prescribe pain medication?  no    7)  Do you have help at home (family/friends) to assist after surgery?  yes    8)  Have you ever had complications after surgery? no      9)  Do you have problems with balance?  sometimes    10) Any allergy to metal or jewelry?  no      11) Any allergy to suture material? yes in 2014    12) How much time off from work are you allowed or planning for?  will talk about it

## 2021-09-10 NOTE — PROGRESS NOTES
Subjective:    Mira Luis is a 51 year old female who presents to clinic today for surgical planning and discussion.  She is scheduled to undergo left foot surgery on 10/5/21: Cheilectomy of left foot.  She has degenerative joint disease of the left first metatarsophalangeal joint with prolific dorsal spurring.  I last evaluated her on 7/14/2021.  Conservative cares and surgical options were reviewed.  She was asked to carefully consider the surgical options.  Please see the clinic note for details. She returns to clinic today having decided to proceed with surgery.    Please see the surgical planning questionnaire below.     Patient Active Problem List   Diagnosis     Uterine fibroid     AGW (anogenital warts)     Fatigue     Obesity (BMI 30-39.9)     High vitamin D level     Vestibular migraine     Chronic daily headache     CKD (chronic kidney disease) stage 2, GFR 60-89 ml/min     Elevated bilirubin     Impaired fasting glucose     Reticulocytosis     Menopausal syndrome (hot flashes)     Hallux rigidus of left foot     Arthritis of first metatarsophalangeal (MTP) joint of left foot     Pain in joint of left foot       Current Outpatient Medications   Medication     BIESTROGEN, 20-80, 1.25 MG COMPOUND     Black Cohosh 540 MG CAPS     brimonidine (ALPHAGAN-P) 0.15 % ophthalmic solution     [place], Zingiber officinalis, (MADISON PO)     progesterone (PROMETRIUM) 100 MG capsule     UNABLE TO FIND     UNABLE TO FIND     UNABLE TO FIND     UNABLE TO FIND     UNABLE TO FIND     UNABLE TO FIND     No current facility-administered medications for this visit.        Objective:   Vascular:  Pedal pulses are palpable bilaterally for both the DP and PT arteries.  CFT < 3 sec.  No edema.  Pedal hair growth noted.      Neuro:  Alert and oriented x 3. Coordinated gait.  Light touch sensation is intact to the L4, L5, S1 distributions. No obvious deficits.  No evidence of neurological-based weakness, spasticity, or contracture  in the lower extremities.      Derm: Normal texture and turgor.  No erythema, ecchymosis, or cyanosis.  No open lesions.      Musculoskeletal:    Lower extremity muscle strength is normal.  Patient is ambulatory without an assistive device or brace .  No gross deformities.  Dorsal medial eminence, left 1st metatarsal head. Abnormal limitation of left hallux dorsiflexion with, and without, loading of the forefoot.         Radiographic Exam:      Assessment:  FOOT LEFT THREE OR MORE VIEWS   9/10/2021 9:40 AM      HISTORY: Left 1st metatarsophalangeal joint pain with degenerative  joint disease; surgical planning. Pain in joint of left foot.  Arthritis of first metatarsophalangeal (MTP) joint. Hallux rigidus of  left foot.     COMPARISON: 7/22/2019 x-ray.                                                                      IMPRESSION: Advanced first MTP degenerative changes with bulbous first  metatarsal head. Otherwise unremarkable. Mild progression since the  comparison study.    Plan:  The surgical procedure was discussed in detail, including risks, benefits, post operative course and cares, and prognosis.  Risks discussed include, but are not limited to,  postoperative pain, swelling,  infection, healing complications, temporary or permanent numbness,  DVT, hypertropohic scar formation, complex regional pain syndrome and need for additional surgery.       I also personally reviewed the x-ray images with her.  I showed her the advanced degenerative changes.  I reminded her that fusion of this joint is an option.  She considered giving me the option of making a intraoperative decision.  Ultimately after discussion, she elected to proceed with the cheilectomy.  She will consider fusion later in life, should the cheilectomy not adequately relieve pain.  I explained that the arthritis is not cured by this surgical procedure.  The goal is to reduce her pain.  I also explained that increased range of motion that can  sometimes occur after removal of the spur can lead to additional pain.    I explained that although infrequent, there are intra-operative and post-operative challenges and complications that can occur.  Some of the latter are listed above.  Intra-operative challenges can involve finding poor bone quality, difficulty with the internal fixation (when used), and even inadvertent injury to neighboring structures that would then need to be repaired.     No guarantees were given.     The patient was also informed that a  might be present on the day of surgery.     Total time spent on this patient's care, date of service 9/10/2021, was 30 minutes.  This included physical exam of her left foot, acquisition of x-rays and personal review of the x-rays, review of surgical risks the procedures and the postoperative course.    Arturo Harris DPM, FACFAS, MS [facility] Department of Podiatry/Foot & Ankle Surgery

## 2021-09-17 ENCOUNTER — OFFICE VISIT (OUTPATIENT)
Dept: PEDIATRICS | Facility: CLINIC | Age: 51
End: 2021-09-17
Payer: COMMERCIAL

## 2021-09-17 VITALS
OXYGEN SATURATION: 97 % | WEIGHT: 214.6 LBS | TEMPERATURE: 98.3 F | DIASTOLIC BLOOD PRESSURE: 86 MMHG | SYSTOLIC BLOOD PRESSURE: 123 MMHG | HEART RATE: 72 BPM | BODY MASS INDEX: 34.64 KG/M2

## 2021-09-17 DIAGNOSIS — T50.Z95S ADVERSE EFFECT OF VACCINE, SEQUELA: ICD-10-CM

## 2021-09-17 DIAGNOSIS — Z87.898 HISTORY OF SEVERE REACTION TO INFLUENZA VACCINE: ICD-10-CM

## 2021-09-17 DIAGNOSIS — Z01.818 PREOP GENERAL PHYSICAL EXAM: Primary | ICD-10-CM

## 2021-09-17 DIAGNOSIS — M79.672 LEFT FOOT PAIN: ICD-10-CM

## 2021-09-17 PROBLEM — T50.Z95A VACCINE REACTION: Status: ACTIVE | Noted: 2021-09-17

## 2021-09-17 PROCEDURE — 99214 OFFICE O/P EST MOD 30 MIN: CPT | Performed by: NURSE PRACTITIONER

## 2021-09-17 NOTE — PROGRESS NOTES
[facility]  [address]  [address]  [facility] [address]  Phone: [phone]  Fax: [phone]  Primary Provider: Deann Coffey  Pre-op Performing Provider: DEANN COFFEY      PREOPERATIVE EVALUATION:  Today's date: 9/17/2021    Mira Luis is a 51 year old female who presents for a preoperative evaluation.    Surgical Information:  Surgery/Procedure: Cheilectomy  Left Foot   Surgery Location: [facility]  Surgeon: Steven  Surgery Date: 10/5/21  Time of Surgery: 7:30 am   Where patient plans to recover: At home with family  Fax number for surgical facility: Note does not need to be faxed, will be available electronically in Epic.    Type of Anesthesia Anticipated: Local with MAC    Assessment & Plan     The proposed surgical procedure is considered INTERMEDIATE risk.    (Z01.818) Preop general physical exam  (primary encounter diagnosis)  (M79.672) Left foot pain    (Z87.898) History of severe reaction to influenza vaccine  Comment: See problem list. Suggested econsult to an allergist. She will consider.   Plan: Wishes to hold off on further vaccines until she consider. Had minimal reaction to the covid vaccine.       Risks and Recommendations:  The patient has the following additional risks and recommendations for perioperative complications:   - No identified additional risk factors other than previously addressed    Medication Instructions:  Patient is to take all scheduled medications on the day of surgery    RECOMMENDATION:  APPROVAL GIVEN to proceed with proposed procedure, without further diagnostic evaluation.        Subjective     HPI related to upcoming procedure:     Preop Questions 9/11/2021   1. Have you ever had a heart attack or stroke? No   2. Have you ever had surgery on your heart or blood vessels, such as a stent placement, a coronary artery bypass, or surgery on an artery in your head, neck, heart, or legs? No   3. Do you have chest  pain with activity? No   4. Do you have a history of  heart failure? No   5. Do you currently have a cold, bronchitis or symptoms of other infection? No   6. Do you have a cough, shortness of breath, or wheezing? No   7. Do you or anyone in your family have previous history of blood clots? No   8. Do you or does anyone in your family have a serious bleeding problem such as prolonged bleeding following surgeries or cuts? No   9. Have you ever had problems with anemia or been told to take iron pills? No   10. Have you had any abnormal blood loss such as black, tarry or bloody stools, or abnormal vaginal bleeding? No   11. Have you ever had a blood transfusion? No   12. Are you willing to have a blood transfusion if it is medically needed before, during, or after your surgery? Yes   13. Have you or any of your relatives ever had problems with anesthesia? No   14. Do you have sleep apnea, excessive snoring or daytime drowsiness? No   15. Do you have any artifical heart valves or other implanted medical devices like a pacemaker, defibrillator, or continuous glucose monitor? No   16. Do you have artificial joints? No   17. Are you allergic to latex? No   18. Is there any chance that you may be pregnant? No    Review of Systems  CONSTITUTIONAL: NEGATIVE for fever, chills, change in weight  ENT/MOUTH: NEGATIVE for ear, mouth and throat problems  RESP: NEGATIVE for significant cough or SOB  CV: NEGATIVE for chest pain, palpitations or peripheral edema    Patient Active Problem List    Diagnosis Date Noted     Hallux rigidus of left foot 07/30/2021     Priority: Medium     Added automatically from request for surgery [id number]       Arthritis of first metatarsophalangeal (MTP) joint of left foot 07/30/2021     Priority: Medium     Added automatically from request for surgery [id number]       Pain in joint of left foot 07/30/2021     Priority: Medium     Added automatically from request for surgery [id number]       Menopausal syndrome  (hot flashes) 06/28/2021     Priority: Medium     CKD (chronic kidney disease) stage 2, GFR 60-89 ml/min 07/22/2019     Priority: Medium     Elevated bilirubin 07/22/2019     Priority: Medium     Impaired fasting glucose 07/22/2019     Priority: Medium     Reticulocytosis 07/22/2019     Priority: Medium     Chronic daily headache 04/23/2018     Priority: Medium     Vestibular migraine 06/30/2017     Priority: Medium     High vitamin D level 06/17/2014     Priority: Medium     Obesity (BMI 30-39.9) 11/04/2013     Priority: Medium     Fatigue 10/07/2013     Priority: Medium     AGW (anogenital warts) 03/16/2012     Priority: Medium     Uterine fibroid 06/06/2011     Priority: Medium      Past Medical History:   Diagnosis Date     Arthritis of first metatarsophalangeal (MTP) joint of left foot 7/30/2021     ASCUS (atypical squamous cells of undetermined significance) on Pap smear 11/11/13    neg HPV. plan to repeat pap/HPV in 1 year     Heart palpitations 10/1998     LSIL (low grade squamous intraepithelial lesion) on Pap smear 10/12/11, 6/2012 11/11/11 colp - LSIL     Migraines Age 20's    Cluster     Obesity      Past Surgical History:   Procedure Laterality Date     COLONOSCOPY N/A 8/19/2021    Procedure: COLONOSCOPY;  Surgeon: Corwin Remy MD;  Location:  GI     EXCISE VULVA WIDE LOCAL  1/16/2013    Procedure: EXCISE VULVA WIDE LOCAL;  Vaginal wide local excision of the vulva.   Diagnosis: vulva chondyloma.;  Surgeon: Pretty Collado MD;  Location:  OR     SURGICAL HISTORY OF -   2/2008    Fibula Fx     Current Outpatient Medications   Medication Sig Dispense Refill     BIESTROGEN, 20-80, 1.25 MG COMPOUND Apply 1 mg topically daily 50/50 1MG/GRAM APPLY 2 PUMPS TOPICALLY ONCE A DAY       Black Cohosh 540 MG CAPS Take 1 tablet by mouth daily       brimonidine (ALPHAGAN-P) 0.15 % ophthalmic solution INSTILL 1 DROP INTO BOTH EYES TWICE DAILY AS DIRECTED       [place], Zingiber officinalis, ([place] PO)         progesterone (PROMETRIUM) 100 MG capsule Take 100 mg by mouth At Bedtime       UNABLE TO FIND TAYLOR 1000 mg/day       UNABLE TO FIND Melatonin Blend 100 g theanine, 5 mg melatonin       UNABLE TO FIND C+ Quercitin 1000 mg/day       UNABLE TO FIND Dessicated Liver 3000 mg/day       UNABLE TO FIND Valerian Root 1590 mg/day       UNABLE TO FIND GIANNI-e 400 mg/day         Allergies   Allergen Reactions     Shellfish Allergy      Throat closed     Sulfa Drugs         Social History     Tobacco Use     Smoking status: Never Smoker     Smokeless tobacco: Never Used   Substance Use Topics     Alcohol use: Not Currently     Family History   Problem Relation Age of Onset     Ulcerative Colitis Mother      Arrhythmia Mother      Hypertension Maternal Grandmother      Cancer Maternal Aunt      Cancer Paternal Aunt      Diabetes No family hx of      Cerebrovascular Disease No family hx of      Thyroid Disease No family hx of      Glaucoma No family hx of      Macular Degeneration No family hx of      Colon Cancer No family hx of      History   Drug Use No         Objective     There were no vitals taken for this visit.    Physical Exam    GENERAL APPEARANCE: healthy, alert and no distress     EYES: EOMI, PERRL     HENT: ear canals and TM's normal and nose and mouth without ulcers or lesions     NECK: no adenopathy, no asymmetry, masses, or scars and thyroid normal to palpation     RESP: lungs clear to auscultation - no rales, rhonchi or wheezes     CV: regular rates and rhythm, normal S1 S2, no S3 or S4 and no murmur, click or rub     ABDOMEN:  soft, nontender, no HSM or masses and bowel sounds normal     MS: extremities normal- no gross deformities noted, no evidence of inflammation in joints, FROM in all extremities.     SKIN: no suspicious lesions or rashes     NEURO: Normal strength and tone, sensory exam grossly normal, mentation intact and speech normal     PSYCH: mentation appears normal. and affect normal/bright      LYMPHATICS: No cervical adenopathy    Recent Labs   Lab Test 06/28/21  0947 10/09/20  0758   HGB 14.1 13.9    190    140   POTASSIUM 4.2 3.9   CR 1.01 1.01   A1C 5.1 4.7        Signed Electronically by: LILIANA Diaz CNP  Copy of this evaluation report is provided to requesting physician.

## 2021-09-17 NOTE — PATIENT INSTRUCTIONS

## 2021-09-26 ENCOUNTER — HEALTH MAINTENANCE LETTER (OUTPATIENT)
Age: 51
End: 2021-09-26

## 2021-10-01 ENCOUNTER — LAB (OUTPATIENT)
Dept: LAB | Facility: CLINIC | Age: 51
End: 2021-10-01
Payer: COMMERCIAL

## 2021-10-01 DIAGNOSIS — Z11.59 ENCOUNTER FOR SCREENING FOR OTHER VIRAL DISEASES: ICD-10-CM

## 2021-10-01 PROCEDURE — U0003 INFECTIOUS AGENT DETECTION BY NUCLEIC ACID (DNA OR RNA); SEVERE ACUTE RESPIRATORY SYNDROME CORONAVIRUS 2 (SARS-COV-2) (CORONAVIRUS DISEASE [COVID-19]), AMPLIFIED PROBE TECHNIQUE, MAKING USE OF HIGH THROUGHPUT TECHNOLOGIES AS DESCRIBED BY CMS-2020-01-R: HCPCS

## 2021-10-01 PROCEDURE — U0005 INFEC AGEN DETEC AMPLI PROBE: HCPCS

## 2021-10-02 LAB — SARS-COV-2 RNA RESP QL NAA+PROBE: NEGATIVE

## 2021-10-04 ENCOUNTER — ANESTHESIA EVENT (OUTPATIENT)
Dept: SURGERY | Facility: CLINIC | Age: 51
End: 2021-10-04

## 2021-10-04 RX ORDER — CEFAZOLIN SODIUM 2 G/100ML
2 INJECTION, SOLUTION INTRAVENOUS SEE ADMIN INSTRUCTIONS
Status: CANCELLED | OUTPATIENT
Start: 2021-10-04

## 2021-10-04 RX ORDER — CEFAZOLIN SODIUM 2 G/100ML
2 INJECTION, SOLUTION INTRAVENOUS
Status: CANCELLED | OUTPATIENT
Start: 2021-10-04

## 2021-10-04 NOTE — ANESTHESIA PREPROCEDURE EVALUATION
Anesthesia Pre-Procedure Evaluation    Patient: Mira Luis   MRN: [id number] : 1970        Preoperative Diagnosis: Hallux rigidus of left foot [M20.22]  Arthritis of first metatarsophalangeal (MTP) joint of left foot [M19.072]  Pain in joint of left foot [M25.572]    Procedure : Procedure(s):  CHEILECTOMY left foot          Past Medical History:   Diagnosis Date     Arthritis of first metatarsophalangeal (MTP) joint of left foot 2021     ASCUS (atypical squamous cells of undetermined significance) on Pap smear 13    neg HPV. plan to repeat pap/HPV in 1 year     Heart palpitations 10/1998     LSIL (low grade squamous intraepithelial lesion) on Pap smear 10/12/11, 11 colp - LSIL     Migraines Age 20's    Cluster     Obesity       Past Surgical History:   Procedure Laterality Date     COLONOSCOPY N/A 2021    Procedure: COLONOSCOPY;  Surgeon: Corwin Remy MD;  Location:  GI     EXCISE VULVA WIDE LOCAL  2013    Procedure: EXCISE VULVA WIDE LOCAL;  Vaginal wide local excision of the vulva.   Diagnosis: vulva chondyloma.;  Surgeon: Pretty Collado MD;  Location:  OR     SURGICAL HISTORY OF -   2008    Fibula Fx      Allergies   Allergen Reactions     Shellfish Allergy      Throat closed     Sulfa Drugs       Social History     Tobacco Use     Smoking status: Never Smoker     Smokeless tobacco: Never Used   Substance Use Topics     Alcohol use: Not Currently      Wt Readings from Last 1 Encounters:   21 97.3 kg (214 lb 9.6 oz)        Anesthesia Evaluation            ROS/MED HX  ENT/Pulmonary:       Neurologic:     (+) migraines,     Cardiovascular:       METS/Exercise Tolerance:     Hematologic:       Musculoskeletal:   (+) arthritis,     GI/Hepatic:       Renal/Genitourinary:       Endo: Comment: BMI 35    (+) Obesity,     Psychiatric/Substance Use:       Infectious Disease:       Malignancy:       Other:               OUTSIDE LABS:  CBC:   Lab Results    Component Value Date    WBC 3.7 (L) 06/28/2021    WBC 4.4 10/09/2020    HGB 14.1 06/28/2021    HGB 13.9 10/09/2020    HCT 39.5 06/28/2021    HCT 39.8 10/09/2020     06/28/2021     10/09/2020     BMP:   Lab Results   Component Value Date     06/28/2021     10/09/2020    POTASSIUM 4.2 06/28/2021    POTASSIUM 3.9 10/09/2020    CHLORIDE 110 (H) 06/28/2021    CHLORIDE 110 (H) 10/09/2020    CO2 26 06/28/2021    CO2 19 (L) 10/09/2020    BUN 12 06/28/2021    BUN 14 10/09/2020    CR 1.01 06/28/2021    CR 1.01 10/09/2020     (H) 06/28/2021    GLC 99 10/09/2020     COAGS:   Lab Results   Component Value Date    FIBR 266 06/28/2021     POC:   Lab Results   Component Value Date    HCG neg 01/16/2013     HEPATIC:   Lab Results   Component Value Date    ALBUMIN 3.8 06/28/2021    PROTTOTAL 6.8 06/28/2021    ALT 37 06/28/2021    AST 27 06/28/2021    GGT 21 06/28/2021    ALKPHOS 61 06/28/2021    BILITOTAL 1.1 06/28/2021     OTHER:   Lab Results   Component Value Date    A1C 5.1 06/28/2021    CHUY 8.9 06/28/2021    MAG 2.1 06/28/2021    TSH 1.48 06/28/2021    T4 0.91 06/28/2021    CRP <2.9 06/28/2021    SED 5 06/28/2021       Anesthesia Plan    ASA Status:  2      Anesthesia Type: MAC.              Consents            Postoperative Care    Pain management: Multi-modal analgesia.   PONV prophylaxis: Ondansetron (or other 5HT-3)     Comments:                Robinson Calix MD

## 2021-10-05 ENCOUNTER — ANESTHESIA (OUTPATIENT)
Dept: SURGERY | Facility: CLINIC | Age: 51
End: 2021-10-05

## 2021-10-05 ENCOUNTER — HOSPITAL ENCOUNTER (OUTPATIENT)
Facility: CLINIC | Age: 51
Discharge: HOME | End: 2021-10-05
Attending: PODIATRIST | Admitting: PODIATRIST
Payer: COMMERCIAL

## 2021-10-05 DIAGNOSIS — M20.22 HALLUX RIGIDUS OF LEFT FOOT: ICD-10-CM

## 2021-10-05 DIAGNOSIS — M25.572 PAIN IN JOINT OF LEFT FOOT: ICD-10-CM

## 2021-10-05 DIAGNOSIS — Z11.59 ENCOUNTER FOR SCREENING FOR OTHER VIRAL DISEASES: ICD-10-CM

## 2021-10-05 DIAGNOSIS — M19.072 ARTHRITIS OF FIRST METATARSOPHALANGEAL (MTP) JOINT OF LEFT FOOT: ICD-10-CM

## 2021-10-08 ENCOUNTER — PREP FOR PROCEDURE (OUTPATIENT)
Dept: PODIATRY | Facility: CLINIC | Age: 51
End: 2021-10-08

## 2021-10-08 ENCOUNTER — HOSPITAL ENCOUNTER (OUTPATIENT)
Facility: CLINIC | Age: 51
End: 2021-10-08
Attending: PODIATRIST | Admitting: PODIATRIST
Payer: COMMERCIAL

## 2021-10-08 ENCOUNTER — TELEPHONE (OUTPATIENT)
Dept: PODIATRY | Facility: CLINIC | Age: 51
End: 2021-10-08

## 2021-10-08 DIAGNOSIS — M25.572 PAIN IN JOINT OF LEFT FOOT: ICD-10-CM

## 2021-10-08 DIAGNOSIS — M20.22 HALLUX RIGIDUS OF LEFT FOOT: ICD-10-CM

## 2021-10-08 DIAGNOSIS — M19.072 ARTHRITIS OF FIRST METATARSOPHALANGEAL (MTP) JOINT OF LEFT FOOT: ICD-10-CM

## 2021-10-08 DIAGNOSIS — M20.22 HALLUX RIGIDUS OF LEFT FOOT: Primary | ICD-10-CM

## 2021-10-15 ENCOUNTER — TELEPHONE (OUTPATIENT)
Dept: PEDIATRICS | Facility: CLINIC | Age: 51
End: 2021-10-15

## 2021-10-15 NOTE — TELEPHONE ENCOUNTER
I am fine with signing off and approving that her previous pre-op suffices.  I called and informed CAE Toth.    Dr. Harris

## 2021-10-15 NOTE — TELEPHONE ENCOUNTER
[facility] pre op calling saying pre op is outdated by two days could we update her pre op to work for her DOS 10/19.   Please advise and addend if appropriate. Dr. Harris would just need to sign off when he is there for surgery so she does not have to a another pre op .   Phone [phone] Rhonda BELLO   Thank you,   Vera Felix

## 2021-10-16 ENCOUNTER — LAB (OUTPATIENT)
Dept: FAMILY MEDICINE | Facility: CLINIC | Age: 51
End: 2021-10-16
Payer: COMMERCIAL

## 2021-10-16 DIAGNOSIS — Z11.59 ENCOUNTER FOR SCREENING FOR OTHER VIRAL DISEASES: ICD-10-CM

## 2021-10-16 PROCEDURE — U0003 INFECTIOUS AGENT DETECTION BY NUCLEIC ACID (DNA OR RNA); SEVERE ACUTE RESPIRATORY SYNDROME CORONAVIRUS 2 (SARS-COV-2) (CORONAVIRUS DISEASE [COVID-19]), AMPLIFIED PROBE TECHNIQUE, MAKING USE OF HIGH THROUGHPUT TECHNOLOGIES AS DESCRIBED BY CMS-2020-01-R: HCPCS

## 2021-10-16 PROCEDURE — U0005 INFEC AGEN DETEC AMPLI PROBE: HCPCS

## 2021-10-17 LAB — SARS-COV-2 RNA RESP QL NAA+PROBE: NEGATIVE

## 2021-10-18 RX ORDER — CEFAZOLIN SODIUM 2 G/100ML
2 INJECTION, SOLUTION INTRAVENOUS
Status: CANCELLED | OUTPATIENT
Start: 2021-10-18

## 2021-10-18 RX ORDER — CEFAZOLIN SODIUM 2 G/100ML
2 INJECTION, SOLUTION INTRAVENOUS SEE ADMIN INSTRUCTIONS
Status: CANCELLED | OUTPATIENT
Start: 2021-10-18

## 2021-10-19 ENCOUNTER — APPOINTMENT (OUTPATIENT)
Dept: GENERAL RADIOLOGY | Facility: CLINIC | Age: 51
End: 2021-10-19
Attending: PODIATRIST
Payer: COMMERCIAL

## 2021-10-19 ENCOUNTER — ANESTHESIA (OUTPATIENT)
Dept: SURGERY | Facility: CLINIC | Age: 51
End: 2021-10-19
Payer: COMMERCIAL

## 2021-10-19 ENCOUNTER — TELEPHONE (OUTPATIENT)
Dept: PODIATRY | Facility: CLINIC | Age: 51
End: 2021-10-19

## 2021-10-19 ENCOUNTER — HOSPITAL ENCOUNTER (OUTPATIENT)
Facility: CLINIC | Age: 51
Discharge: HOME | End: 2021-10-19
Attending: PODIATRIST | Admitting: PODIATRIST
Payer: COMMERCIAL

## 2021-10-19 ENCOUNTER — ANESTHESIA EVENT (OUTPATIENT)
Dept: SURGERY | Facility: CLINIC | Age: 51
End: 2021-10-19
Payer: COMMERCIAL

## 2021-10-19 VITALS
WEIGHT: 214.4 LBS | HEIGHT: 66 IN | TEMPERATURE: 97 F | HEART RATE: 61 BPM | RESPIRATION RATE: 16 BRPM | OXYGEN SATURATION: 99 % | SYSTOLIC BLOOD PRESSURE: 141 MMHG | BODY MASS INDEX: 34.46 KG/M2 | DIASTOLIC BLOOD PRESSURE: 97 MMHG

## 2021-10-19 DIAGNOSIS — T50.905A ITCHING DUE TO DRUG: ICD-10-CM

## 2021-10-19 DIAGNOSIS — M20.22 HALLUX RIGIDUS OF LEFT FOOT: ICD-10-CM

## 2021-10-19 DIAGNOSIS — Z98.890 POSTOPERATIVE STATE: ICD-10-CM

## 2021-10-19 DIAGNOSIS — L29.89 ITCHING DUE TO DRUG: ICD-10-CM

## 2021-10-19 DIAGNOSIS — M19.072 ARTHRITIS OF FIRST METATARSOPHALANGEAL (MTP) JOINT OF LEFT FOOT: ICD-10-CM

## 2021-10-19 DIAGNOSIS — G89.18 ACUTE POST-OPERATIVE PAIN: Primary | ICD-10-CM

## 2021-10-19 DIAGNOSIS — M25.572 PAIN IN JOINT OF LEFT FOOT: ICD-10-CM

## 2021-10-19 PROCEDURE — 271N000001 HC OR GENERAL SUPPLY NON-STERILE: Performed by: PODIATRIST

## 2021-10-19 PROCEDURE — 272N000001 HC OR GENERAL SUPPLY STERILE: Performed by: PODIATRIST

## 2021-10-19 PROCEDURE — 710N000009 HC RECOVERY PHASE 1, LEVEL 1, PER MIN: Performed by: PODIATRIST

## 2021-10-19 PROCEDURE — 999N000063 XR FOOT PORT LEFT 3 VIEWS: Mod: LT

## 2021-10-19 PROCEDURE — 28289 CORRJ HALUX RIGDUS W/O IMPLT: CPT | Mod: TA | Performed by: PODIATRIST

## 2021-10-19 PROCEDURE — 250N000013 HC RX MED GY IP 250 OP 250 PS 637: Performed by: ANESTHESIOLOGY

## 2021-10-19 PROCEDURE — 258N000003 HC RX IP 258 OP 636: Performed by: NURSE ANESTHETIST, CERTIFIED REGISTERED

## 2021-10-19 PROCEDURE — 999N000141 HC STATISTIC PRE-PROCEDURE NURSING ASSESSMENT: Performed by: PODIATRIST

## 2021-10-19 PROCEDURE — 250N000009 HC RX 250: Performed by: PODIATRIST

## 2021-10-19 PROCEDURE — 370N000017 HC ANESTHESIA TECHNICAL FEE, PER MIN: Performed by: PODIATRIST

## 2021-10-19 PROCEDURE — L4361 PNEUMA/VAC WALK BOOT PRE OTS: HCPCS

## 2021-10-19 PROCEDURE — 360N000076 HC SURGERY LEVEL 3, PER MIN: Performed by: PODIATRIST

## 2021-10-19 PROCEDURE — 999N000179 XR SURGERY CARM FLUORO LESS THAN 5 MIN W STILLS

## 2021-10-19 PROCEDURE — 710N000012 HC RECOVERY PHASE 2, PER MINUTE: Performed by: PODIATRIST

## 2021-10-19 PROCEDURE — 250N000009 HC RX 250: Performed by: NURSE ANESTHETIST, CERTIFIED REGISTERED

## 2021-10-19 PROCEDURE — 250N000011 HC RX IP 250 OP 636: Performed by: PODIATRIST

## 2021-10-19 PROCEDURE — 250N000011 HC RX IP 250 OP 636: Performed by: NURSE ANESTHETIST, CERTIFIED REGISTERED

## 2021-10-19 RX ORDER — ACETAMINOPHEN 500 MG
1000 TABLET ORAL EVERY 8 HOURS PRN
Qty: 42 TABLET | Refills: 1 | Status: SHIPPED | OUTPATIENT
Start: 2021-10-19 | End: 2021-12-02

## 2021-10-19 RX ORDER — MAGNESIUM HYDROXIDE 1200 MG/15ML
LIQUID ORAL PRN
Status: DISCONTINUED | OUTPATIENT
Start: 2021-10-19 | End: 2021-10-19 | Stop reason: HOSPADM

## 2021-10-19 RX ORDER — BUPIVACAINE HYDROCHLORIDE 5 MG/ML
INJECTION, SOLUTION PERINEURAL PRN
Status: DISCONTINUED | OUTPATIENT
Start: 2021-10-19 | End: 2021-10-19 | Stop reason: HOSPADM

## 2021-10-19 RX ORDER — BUPIVACAINE HYDROCHLORIDE 5 MG/ML
INJECTION, SOLUTION EPIDURAL; INTRACAUDAL
Status: DISCONTINUED
Start: 2021-10-19 | End: 2021-10-19 | Stop reason: HOSPADM

## 2021-10-19 RX ORDER — LIDOCAINE HYDROCHLORIDE 20 MG/ML
INJECTION, SOLUTION INFILTRATION; PERINEURAL PRN
Status: DISCONTINUED | OUTPATIENT
Start: 2021-10-19 | End: 2021-10-19

## 2021-10-19 RX ORDER — ONDANSETRON 2 MG/ML
INJECTION INTRAMUSCULAR; INTRAVENOUS PRN
Status: DISCONTINUED | OUTPATIENT
Start: 2021-10-19 | End: 2021-10-19

## 2021-10-19 RX ORDER — CEFAZOLIN SODIUM 2 G/100ML
2 INJECTION, SOLUTION INTRAVENOUS SEE ADMIN INSTRUCTIONS
Status: DISCONTINUED | OUTPATIENT
Start: 2021-10-19 | End: 2021-10-19 | Stop reason: HOSPADM

## 2021-10-19 RX ORDER — LIDOCAINE HYDROCHLORIDE 20 MG/ML
INJECTION, SOLUTION INFILTRATION; PERINEURAL PRN
Status: DISCONTINUED | OUTPATIENT
Start: 2021-10-19 | End: 2021-10-19 | Stop reason: HOSPADM

## 2021-10-19 RX ORDER — FENTANYL CITRATE 50 UG/ML
INJECTION, SOLUTION INTRAMUSCULAR; INTRAVENOUS PRN
Status: DISCONTINUED | OUTPATIENT
Start: 2021-10-19 | End: 2021-10-19

## 2021-10-19 RX ORDER — SODIUM CHLORIDE, SODIUM LACTATE, POTASSIUM CHLORIDE, CALCIUM CHLORIDE 600; 310; 30; 20 MG/100ML; MG/100ML; MG/100ML; MG/100ML
INJECTION, SOLUTION INTRAVENOUS CONTINUOUS PRN
Status: DISCONTINUED | OUTPATIENT
Start: 2021-10-19 | End: 2021-10-19

## 2021-10-19 RX ORDER — CEFAZOLIN SODIUM 2 G/100ML
2 INJECTION, SOLUTION INTRAVENOUS
Status: DISCONTINUED | OUTPATIENT
Start: 2021-10-19 | End: 2021-10-19 | Stop reason: HOSPADM

## 2021-10-19 RX ORDER — PROPOFOL 10 MG/ML
INJECTION, EMULSION INTRAVENOUS CONTINUOUS PRN
Status: DISCONTINUED | OUTPATIENT
Start: 2021-10-19 | End: 2021-10-19

## 2021-10-19 RX ORDER — OXYCODONE HYDROCHLORIDE 5 MG/1
5 TABLET ORAL EVERY 4 HOURS PRN
Status: DISCONTINUED | OUTPATIENT
Start: 2021-10-19 | End: 2021-10-19 | Stop reason: HOSPADM

## 2021-10-19 RX ORDER — FENTANYL CITRATE 0.05 MG/ML
25 INJECTION, SOLUTION INTRAMUSCULAR; INTRAVENOUS
Status: DISCONTINUED | OUTPATIENT
Start: 2021-10-19 | End: 2021-10-19 | Stop reason: HOSPADM

## 2021-10-19 RX ORDER — FENTANYL CITRATE 0.05 MG/ML
25 INJECTION, SOLUTION INTRAMUSCULAR; INTRAVENOUS EVERY 5 MIN PRN
Status: DISCONTINUED | OUTPATIENT
Start: 2021-10-19 | End: 2021-10-19 | Stop reason: HOSPADM

## 2021-10-19 RX ORDER — DEXAMETHASONE SODIUM PHOSPHATE 4 MG/ML
INJECTION, SOLUTION INTRA-ARTICULAR; INTRALESIONAL; INTRAMUSCULAR; INTRAVENOUS; SOFT TISSUE PRN
Status: DISCONTINUED | OUTPATIENT
Start: 2021-10-19 | End: 2021-10-19

## 2021-10-19 RX ORDER — HYDROXYZINE HYDROCHLORIDE 25 MG/1
25 TABLET, FILM COATED ORAL 3 TIMES DAILY PRN
Qty: 30 TABLET | Refills: 0 | Status: SHIPPED | OUTPATIENT
Start: 2021-10-19 | End: 2021-12-02

## 2021-10-19 RX ORDER — SODIUM CHLORIDE, SODIUM LACTATE, POTASSIUM CHLORIDE, CALCIUM CHLORIDE 600; 310; 30; 20 MG/100ML; MG/100ML; MG/100ML; MG/100ML
INJECTION, SOLUTION INTRAVENOUS CONTINUOUS
Status: DISCONTINUED | OUTPATIENT
Start: 2021-10-19 | End: 2021-10-19 | Stop reason: HOSPADM

## 2021-10-19 RX ORDER — ONDANSETRON 2 MG/ML
4 INJECTION INTRAMUSCULAR; INTRAVENOUS EVERY 30 MIN PRN
Status: DISCONTINUED | OUTPATIENT
Start: 2021-10-19 | End: 2021-10-19 | Stop reason: HOSPADM

## 2021-10-19 RX ORDER — CEFAZOLIN SODIUM 2 G/100ML
2 INJECTION, SOLUTION INTRAVENOUS
Status: COMPLETED | OUTPATIENT
Start: 2021-10-19 | End: 2021-10-19

## 2021-10-19 RX ORDER — LIDOCAINE 40 MG/G
CREAM TOPICAL
Status: DISCONTINUED | OUTPATIENT
Start: 2021-10-19 | End: 2021-10-19 | Stop reason: HOSPADM

## 2021-10-19 RX ORDER — ONDANSETRON 4 MG/1
4 TABLET, ORALLY DISINTEGRATING ORAL EVERY 30 MIN PRN
Status: DISCONTINUED | OUTPATIENT
Start: 2021-10-19 | End: 2021-10-19 | Stop reason: HOSPADM

## 2021-10-19 RX ORDER — HYDROMORPHONE HCL IN WATER/PF 6 MG/30 ML
0.2 PATIENT CONTROLLED ANALGESIA SYRINGE INTRAVENOUS EVERY 5 MIN PRN
Status: DISCONTINUED | OUTPATIENT
Start: 2021-10-19 | End: 2021-10-19 | Stop reason: HOSPADM

## 2021-10-19 RX ORDER — OXYCODONE HYDROCHLORIDE 5 MG/1
5 TABLET ORAL EVERY 6 HOURS PRN
Qty: 16 TABLET | Refills: 0 | Status: SHIPPED | OUTPATIENT
Start: 2021-10-19 | End: 2021-10-23

## 2021-10-19 RX ORDER — IBUPROFEN 600 MG/1
600 TABLET, FILM COATED ORAL EVERY 6 HOURS PRN
Qty: 28 TABLET | Refills: 1 | Status: SHIPPED | OUTPATIENT
Start: 2021-10-19 | End: 2023-07-24

## 2021-10-19 RX ADMIN — OXYCODONE HYDROCHLORIDE 5 MG: 5 TABLET ORAL at 12:29

## 2021-10-19 RX ADMIN — SODIUM CHLORIDE, POTASSIUM CHLORIDE, SODIUM LACTATE AND CALCIUM CHLORIDE: 600; 310; 30; 20 INJECTION, SOLUTION INTRAVENOUS at 10:56

## 2021-10-19 RX ADMIN — ONDANSETRON 4 MG: 2 INJECTION INTRAMUSCULAR; INTRAVENOUS at 11:03

## 2021-10-19 RX ADMIN — LIDOCAINE HYDROCHLORIDE 80 MG: 20 INJECTION, SOLUTION INFILTRATION; PERINEURAL at 10:59

## 2021-10-19 RX ADMIN — DEXAMETHASONE SODIUM PHOSPHATE 4 MG: 4 INJECTION, SOLUTION INTRA-ARTICULAR; INTRALESIONAL; INTRAMUSCULAR; INTRAVENOUS; SOFT TISSUE at 11:03

## 2021-10-19 RX ADMIN — FENTANYL CITRATE 50 MCG: 50 INJECTION, SOLUTION INTRAMUSCULAR; INTRAVENOUS at 11:02

## 2021-10-19 RX ADMIN — MIDAZOLAM 2 MG: 1 INJECTION INTRAMUSCULAR; INTRAVENOUS at 10:56

## 2021-10-19 RX ADMIN — CEFAZOLIN SODIUM 2 G: 2 INJECTION, SOLUTION INTRAVENOUS at 10:57

## 2021-10-19 RX ADMIN — PROPOFOL 100 MCG/KG/MIN: 10 INJECTION, EMULSION INTRAVENOUS at 10:59

## 2021-10-19 RX ADMIN — FENTANYL CITRATE 50 MCG: 50 INJECTION, SOLUTION INTRAMUSCULAR; INTRAVENOUS at 10:59

## 2021-10-19 ASSESSMENT — MIFFLIN-ST. JEOR: SCORE: 1609.26

## 2021-10-19 NOTE — ANESTHESIA POSTPROCEDURE EVALUATION
Patient: Mira Luis    Procedure: Procedure(s):  LEFT FOOT CHEILECTOMY       Diagnosis:Hallux rigidus of left foot [M20.22]  Arthritis of first metatarsophalangeal (MTP) joint of left foot [M19.072]  Pain in joint of left foot [M25.572]  Diagnosis Additional Information: No value filed.    Anesthesia Type:  MAC    Note:     Postop Pain Control: Uneventful            Sign Out: Well controlled pain   PONV: No   Neuro/Psych: Uneventful            Sign Out: Acceptable/Baseline neuro status   Airway/Respiratory: Uneventful            Sign Out: Acceptable/Baseline resp. status   CV/Hemodynamics: Uneventful            Sign Out: Acceptable CV status; No obvious hypovolemia; No obvious fluid overload   Other NRE: NONE   DID A NON-ROUTINE EVENT OCCUR? No           Last vitals:  Vitals Value Taken Time   /95 10/19/21 1230   Temp 36.1  C (97  F) 10/19/21 1215   Pulse 60 10/19/21 1233   Resp 11 10/19/21 1233   SpO2 98 % 10/19/21 1234   Vitals shown include unvalidated device data.    Electronically Signed By: Onur Olivera MD  October 19, 2021  12:51 PM

## 2021-10-19 NOTE — OR NURSING
Discharge instructions given to patient and (via phone).  Discharge paperwork and medications placed in patient's belongings bag. Per patient all belongings returned to patient.

## 2021-10-19 NOTE — ANESTHESIA PREPROCEDURE EVALUATION
Anesthesia Pre-Procedure Evaluation    Patient: Mira Luis   MRN: [id number] : 1970        Preoperative Diagnosis: Hallux rigidus of left foot [M20.22]  Arthritis of first metatarsophalangeal (MTP) joint of left foot [M19.072]  Pain in joint of left foot [M25.572]    Procedure : Procedure(s):  left foot CHEILECTOMY          Past Medical History:   Diagnosis Date     AGW (anogenital warts)      Arthritis of first metatarsophalangeal (MTP) joint of left foot 2021     ASCUS (atypical squamous cells of undetermined significance) on Pap smear 13    neg HPV. plan to repeat pap/HPV in 1 year     CKD (chronic kidney disease)      Headache      Heart palpitations 10/1998     LSIL (low grade squamous intraepithelial lesion) on Pap smear 10/12/11, 11 colp - LSIL     Lyme disease      Migraines Age 20's    Cluster     Obesity      Obesity      PONV (postoperative nausea and vomiting)      Reticulocytosis       Past Surgical History:   Procedure Laterality Date     COLONOSCOPY N/A 2021    Procedure: COLONOSCOPY;  Surgeon: Corwin Remy MD;  Location:  GI     EXCISE VULVA WIDE LOCAL  2013    Procedure: EXCISE VULVA WIDE LOCAL;  Vaginal wide local excision of the vulva.   Diagnosis: vulva chondyloma.;  Surgeon: Pretty Collado MD;  Location: [facility] OR     EXCISE VULVA WIDE LOCAL       SURGICAL HISTORY OF -   2008    Fibula Fx      Allergies   Allergen Reactions     Shellfish Allergy      Throat closed     Sulfa Drugs       Social History     Tobacco Use     Smoking status: Never Smoker     Smokeless tobacco: Never Used   Substance Use Topics     Alcohol use: Not Currently      Wt Readings from Last 1 Encounters:   10/19/21 97.3 kg (214 lb 6.4 oz)        Anesthesia Evaluation   Pt has had prior anesthetic.     History of anesthetic complications  - PONV.      ROS/MED HX  ENT/Pulmonary:  - neg pulmonary ROS     Neurologic:       Cardiovascular:     (+) -----Irregular  Heartbeat/Palpitations,  (-) CAD and CHF   METS/Exercise Tolerance: >4 METS    Hematologic:       Musculoskeletal:       GI/Hepatic:  - neg GI/hepatic ROS     Renal/Genitourinary:     (+) renal disease, type: CRI,     Endo:     (+) Obesity,     Psychiatric/Substance Use:       Infectious Disease:       Malignancy:       Other:            Physical Exam    Airway        Mallampati: II   TM distance: > 3 FB   Neck ROM: full   Mouth opening: > 3 cm    Respiratory Devices and Support         Dental  no notable dental history         Cardiovascular             Pulmonary                   OUTSIDE LABS:  CBC:   Lab Results   Component Value Date    WBC 3.7 (L) 06/28/2021    WBC 4.4 10/09/2020    HGB 14.1 06/28/2021    HGB 13.9 10/09/2020    HCT 39.5 06/28/2021    HCT 39.8 10/09/2020     06/28/2021     10/09/2020     BMP:   Lab Results   Component Value Date     06/28/2021     10/09/2020    POTASSIUM 4.2 06/28/2021    POTASSIUM 3.9 10/09/2020    CHLORIDE 110 (H) 06/28/2021    CHLORIDE 110 (H) 10/09/2020    CO2 26 06/28/2021    CO2 19 (L) 10/09/2020    BUN 12 06/28/2021    BUN 14 10/09/2020    CR 1.01 06/28/2021    CR 1.01 10/09/2020     (H) 06/28/2021    GLC 99 10/09/2020     COAGS:   Lab Results   Component Value Date    FIBR 266 06/28/2021     POC:   Lab Results   Component Value Date    HCG neg 01/16/2013     HEPATIC:   Lab Results   Component Value Date    ALBUMIN 3.8 06/28/2021    PROTTOTAL 6.8 06/28/2021    ALT 37 06/28/2021    AST 27 06/28/2021    GGT 21 06/28/2021    ALKPHOS 61 06/28/2021    BILITOTAL 1.1 06/28/2021     OTHER:   Lab Results   Component Value Date    A1C 5.1 06/28/2021    CHUY 8.9 06/28/2021    MAG 2.1 06/28/2021    TSH 1.48 06/28/2021    T4 0.91 06/28/2021    CRP <2.9 06/28/2021    SED 5 06/28/2021       Anesthesia Plan    ASA Status:  2      Anesthesia Type: MAC.              Consents    Anesthesia Plan(s) and associated risks, benefits, and realistic alternatives  discussed. Questions answered and patient/representative(s) expressed understanding.     - Discussed with:  Patient         Postoperative Care            Comments:         H&P reviewed: Unable to attach H&P to encounter due to EHR limitations. H&P Update: appropriate H&P reviewed, patient examined. No interval changes since H&P (within 30 days).         Onur Olivera MD

## 2021-10-19 NOTE — TELEPHONE ENCOUNTER
Dr. Harris was informed of no need to send new prescription and details below.     KAUSHIK Spencer RN

## 2021-10-19 NOTE — TELEPHONE ENCOUNTER
, Oswaldo, calls for patient who is sleeping. He states she had surgery today by Dr. Harris and medications were sent to the incorrect pharmacy.   No consent to communicate with  on file.   He was able to  the Enoxaparin only at [facility] in [facility] and is not at home now. He states the ibuprofen, acetaminophen and hydroxyzine, and oxycodone were all supposed to be sent to [facility]. Will check into further with Dr. Harris and get back with him as he is on his way home. Triage number provided for future questions as he was given an incorrect number and had to be transferred to the [place] office.     Discussed with Dr. Harris. Unsure of what happened as patient should have received all medications at [facility]. Writer will transfer all non narcotics. Dr. Harris to place new prescription for Oxycodone.     Phone call to [facility] and spoke to Zaira. She states they had all the medications ready for patient and sent to her post op bed. Patient refused the medications and asked that they be sent to [facility] in [facility]. They did go ahead and give the Oxycodone to patient because it was not able to be re-prescribed due to it being a narcotic and the provider had already left the building. They are in the process of trying to transfer the other medications to [facility].   Will discuss further with patient and get back with them.     Phone call to  and asked to speak with patient.   Patient states the Oxycodone was sent home with them.   She was informed that we will call in the hydroxyzine to [facility]. Asked if patient had ES Tylenol and ibuprofen at home. If so, she can take it instead, otherwise, we can call in those prescriptions to [facility]. Informed patient that she will need to take three 200mg tabs of Ibuprofen at a time. She states she will use her home supply of both Ibuprofen and ES Tylenol.   At the end of the conversation, she and  were trying  to find the Oxycodone. She asked what she is to do if they aren't able to find it. Explained that it is a narcotic and it most likely would not be re-prescribed. We would need to discuss further with Dr. Harris if that were the case. She states they will continue to look for it.  They had no further questions. Mira did give verbal permission to speak with Oswaldo, , if he needed to call back today.     Phone call to [facility] in [place] and left voicemail with verbal orders for hydroxyzine 25mg, take 1 tab by mouth three times daily as needed for itching, #30, 0 refills.     Phone call to [facility] and informed they do not need to transfer any prescriptions. She verbalized understanding.     KAUSHIK Spencer RN

## 2021-10-19 NOTE — DISCHARGE INSTRUCTIONS
Same Day Surgery Discharge Instructions for  Sedation and General Anesthesia       It's not unusual to feel dizzy, light-headed or faint for up to 24 hours after surgery or while taking pain medication.  If you have these symptoms: sit for a few minutes before standing and have someone assist you when you get up to walk or use the bathroom.      You should rest and relax for the next 24 hours. We recommend you make arrangements to have an adult stay with you for at least 24 hours after your discharge.  Avoid hazardous and strenuous activity.      DO NOT DRIVE any vehicle or operate mechanical equipment for 24 hours following the end of your surgery.  Even though you may feel normal, your reactions may be affected by the medication you have received.      Do not drink alcoholic beverages for 24 hours following surgery.       Slowly progress to your regular diet as you feel able. It's not unusual to feel nauseated and/or vomit after receiving anesthesia.  If you develop these symptoms, drink clear liquids (apple juice, ginger ale, broth, 7-up, etc. ) until you feel better.  If your nausea and vomiting persists for 24 hours, please notify your surgeon.        All narcotic pain medications, along with inactivity and anesthesia, can cause constipation. Drinking plenty of liquids and increasing fiber intake will help.      For any questions of a medical nature, call your surgeon.      Do not make important decisions for 24 hours.      If you had general anesthesia, you may have a sore throat for a couple of days related to the breathing tube used during surgery.  You may use Cepacol lozenges to help with this discomfort.  If it worsens or if you develop a fever, contact your surgeon.       If you feel your pain is not well managed with the pain medications prescribed by your surgeon, please contact your surgeon's office to let them know so they can address your concerns.       CoVid 19 Information    We want to give you  information regarding Covid. Please consult your primary care provider with any questions you might have.     Patient who have symptoms (cough, fever, or shortness of breath), need to isolate for 7 days from when symptoms started OR 72 hours after fever resolves (without fever reducing medications) AND improvement of respiratory symptoms (whichever is longer).      Isolate yourself at home (in own room/own bathroom if possible)    Do Not allow any visitors    Do Not go to work or school    Do Not go to [employer]ism,  centers, shopping, or other public places.    Do Not shake hands.    Avoid close and intimate contact with others (hugging, kissing).    Follow CDC recommendations for household cleaning of frequently touched services.     After the initial 7 days, continue to isolate yourself from household members as much as possible. To continue decrease the risk of community spread and exposure, you and any members of your household should limit activities in public for 14 days after starting home isolation.     You can reference the following CDC link for helpful home isolation/care tips:  https://www.cdc.gov/coronavirus/2019-ncov/downloads/10Things.pdf    Protect Others:    Cover Your Mouth and Nose with a mask, disposable tissue or wash cloth to avoid spreading germs to others.    Wash your hands and face frequently with soap and water    Call Your Primary Doctor If: Breathing difficulty develops or you become worse.    For more information about COVID19 and options for caring for yourself at home, please visit the CDC website at https://www.cdc.gov/coronavirus/2019-ncov/about/steps-when-sick.html  For more options for care at [facility], please visit our website at [email or web address]           ** If you have questions or concerns about your procedure,  call Dr. Harris at [phone] **        Enoxaparin Sodium (Lovenox)  Solution for injection  What is this  "medicine?  Lovenox is an injectable anti-coagulant (sometimes called a \"blood thinner\") used to prevent blood clots, treat existing blood clots or as a bridge if you've stopped taking your Coumadin for surgery.   What should I tell my health care provider before I take this medicine?  They need to know if you have any of these conditions:  bleeding disorders, hemorrhage, or hemophilia  infection of the heart or heart valves  kidney or liver disease  previous stroke  prosthetic heart valve  recent surgery or delivery of a baby  ulcer in the stomach or intestine, diverticulitis, or other bowel disease  an unusual or allergic reaction to enoxaparin, heparin, pork or pork products, other medicines, foods, dyes, or preservatives  pregnant or trying to get pregnant  breast-feeding  How should I use this medicine?     Enoxaparin should be taken at the same time everyday.  It is injected under the skin into fatty tissue, usually the belly. If you think you have taken too much of this medicine contact a poison control center or emergency room at once.  What if I miss a dose?  If you miss a dose, take it as soon as you can. If it is almost time for your next dose, take only that dose. Do not take double or extra doses.  What may interact with this medicine?  Do not take this medicine with any of the following medications:  aspirin and aspirin-like medicines  heparin  mifepristone  warfarin  This medicine may also interact with the following medications:  cilostazol  clopidogrel  dipyridamole  NSAIDs, medicines for pain and inflammation, like ibuprofen or naproxen  sulfinpyrazone  ticlopidine  This list may not describe all possible interactions. Give your health care provider a list of all the medicines, herbs, non-prescription drugs, or dietary supplements you use. Also tell them if you smoke, drink alcohol, or use illegal drugs. Some items may interact with your medicine.  Where should I keep my medicine?  Keep out of the " reach of children.  Store at room temperature between 15 and 30 F C (59 and 86  F). Do not freeze. If your injections have been specially prepared, you may need to store them in the refrigerator. Ask your pharmacist. Throw away any unused medicine after the expiration date.  How to inject enoxaparin (Lovenox):   1.  Wash your hands.   2.  Gather supplies:  syringe and alcohol wipe.    3.  Remove syringe from package.   4.  Select an area on you belly for injection:    Do not inject within 2 inches of belly button     Each time you inject, switch to the other side of your belly.                          Do not inject into scars, moles tattoos, burns, or bruises.     5.  Clean the area with an alcohol pad and allow to dry.   6.  Remove cap from syringe   7.  Hold syringe like a pencil with your dominant hand, with your other                hand gently pinch the area you cleansed with you thumb and                forefinger.  8.  Insert the needle quickly into the fatty roll at a 90 degree angle, making sure  the needle completely into the skin. Do not let go of the fatty roll until you have                                removed the needle.   9.   Press down slowly on the plunger with your finger until syringe is empty.  10. Remove needle and let go of fatty tissue.  Do not rub the injection site. Press gently on site with the alcohol wipe until it stops bleeding.   11. Press the plunger on the syringe to pop the cover over the needle. Discard into  sharps container.     What side effects may I notice from receiving this medicine?  Side effects that you should report to your doctor or health care professional as soon as possible:  allergic reactions like skin rash, itching or hives, swelling of the face, lips, or tongue  black, tarry stools  breathing problems  dark urine  feeling faint or lightheaded, falls  fever  heavy menstrual bleeding  unusual bruising or bleeding

## 2021-10-19 NOTE — BRIEF OP NOTE
[facility]    Brief Operative Note    Pre-operative diagnosis: Hallux rigidus of left foot [M20.22]  Arthritis of first metatarsophalangeal (MTP) joint of left foot [M19.072]  Pain in joint of left foot [M25.572]  Post-operative diagnosis Same as pre-operative diagnosis    Procedure: Procedure(s):  LEFT FOOT CHEILECTOMY  Surgeon: Surgeon(s) and Role:     * Arturo Harris DPM - Primary  Anesthesia: Combined MAC with Local   Estimated Blood Loss: 2 ml    Drains: None  Specimens: * No specimens in log *  Findings:   None.  Complications: None.  Implants: * No implants in log *      ALPESH Christopher DPM, MS  [facility] Department of Podiatry/Foot & Ankle Surgery  [phone]

## 2021-10-19 NOTE — OP NOTE
Procedure Date: 10/19/2021    SURGEON:  Arturo Harris DPM.    :  Aravind Patterson DPM.    PREOPERATIVE DIAGNOSES:  Hallux limitus with degenerative joint disease, left first metatarsophalangeal joint.    POSTOPERATIVE DIAGNOSES:    Hallux limitus with degenerative joint disease, left first metatarsophalangeal joint.    PROCEDURE:  Cheilectomy, left first metatarsophalangeal joint.    ANESTHESIA:  MAC with local.    HEMOSTASIS:  Electrocautery.    ESTIMATED BLOOD LOSS:  2 mL    MATERIALS:  Absorbable and nonabsorbable suture material.    INJECTABLES:  0.5% Marcaine plain injected preoperatively and 2% lidocaine plain injected intraoperatively.    COMPLICATIONS:  None apparent.    INTRAOPERATIVE FINDINGS:  The cartilage at the base of the left hallux proximal phalanx is thin. There is one-half to two-thirds cartilage loss on the head of the left first metatarsal.  In general, the joint surface is smooth.  Dorsal spurring of both the first metatarsal head and the proximal phalanx, consistent with x-ray.  Thickened dorsal synovial tissue.    INDICATIONS FOR PROCEDURE:  Mira Luis is a 51-year-old female who saw me previously in clinic for ongoing and progressive pain associated with her left foot first metatarsophalangeal joint.  Clinical exam was consistent with hallux limitus and x-ray demonstrated fairly significant degenerative joint disease. She inquired about surgical intervention.  Conservative cares did not adequately relieve her pain. There was enough degenerative joint disease that osteotomy was not indicated in my opinion.  We discussed the option of cheilectomy versus primary fusion of the joint.  After discussion and careful consideration, she elected to proceed with a cheilectomy, understanding that she might need joint fusion in the future.  The procedure was discussed in detail including the risks, the benefits, the postoperative cares, course and prognosis.  For details regarding the  most recent exam and discussion, please refer to the clinic note from 09/10/2021.    DESCRIPTION OF PROCEDURE:  Gabe Rojas was transported to the operating room and placed supine on the operating table.  IV sedation was initiated.  A timeout was called and local anesthetic was injected into the left foot.  The left foot was then prepped and draped in the normal aseptic fashion.  A timeout was called for the procedure.  The foot was exsanguinated and the ankle tourniquet inflated.    The standard longitudinal incision was made spanning the left first metatarsophalangeal joint, medial to the extensor hallucis longus tendon.  Layered dissection was performed.  Bleeding vessels were electrocauterized.  Dissection was carried down to the level of bone.  Medial and lateral periosteal and subcapsular reflection was performed to adequately expose the first metatarsophalangeal joint.    Using a sagittal saw and rongeur, the dorsal spurring on the first metatarsal head was resected.  Some medial bone was also removed.  A rasp was used to smooth all surfaces.  In similar fashion, a small spur was removed from the dorsal base of the proximal phalanx.    Thickened synovium was excised.  At this point, the joint was inspected.  The cartilage on the proximal phalanx was noted to be thinning.  There was approximately one-half to two-thirds loss of articular cartilage on the first metatarsal head, yet this surface remained smooth.    The wound was irrigated with a copious amount of normal sterile saline.  Intraoperative imaging was used to confirm satisfactory remodeling or removal of prominent bone.  Layered closure was then performed.  A well-padded compressive dressing was placed. Gabe Rojas tolerated the anesthesia and procedure well.    Arturo Harris DPM        D: 10/19/2021   T: 10/19/2021   MT: [id number]    Name:     GABE ROJAS  MRN:      [id number]        Account:        [id number]   :      1970            Procedure Date: 10/19/2021     Document: [id number]

## 2021-10-25 ENCOUNTER — MYC MEDICAL ADVICE (OUTPATIENT)
Dept: PODIATRY | Facility: CLINIC | Age: 51
End: 2021-10-25

## 2021-10-25 NOTE — TELEPHONE ENCOUNTER
I called and spoke with Mira.  We will discontinue the enoxaparin at this time.  She is comfortable enough to do more of the ankle range of motion exercises.  This can be done with the boot off.  We reviewed this.    She is to continue with Tylenol, ibuprofen, elevation and ice as needed for pain relief.    We will review things again tomorrow in clinic.    Dr. Harris

## 2021-10-26 ENCOUNTER — OFFICE VISIT (OUTPATIENT)
Dept: PODIATRY | Facility: CLINIC | Age: 51
End: 2021-10-26
Payer: COMMERCIAL

## 2021-10-26 VITALS
HEIGHT: 66 IN | SYSTOLIC BLOOD PRESSURE: 124 MMHG | DIASTOLIC BLOOD PRESSURE: 74 MMHG | BODY MASS INDEX: 34.46 KG/M2 | WEIGHT: 214.4 LBS

## 2021-10-26 DIAGNOSIS — Z09 SURGERY FOLLOW-UP EXAMINATION: Primary | ICD-10-CM

## 2021-10-26 PROCEDURE — 99024 POSTOP FOLLOW-UP VISIT: CPT | Performed by: PODIATRIST

## 2021-10-26 ASSESSMENT — MIFFLIN-ST. JEOR: SCORE: 1609.26

## 2021-10-26 NOTE — LETTER
10/26/2021         RE: Mira Luis  [address] [place] [address]        Dear Colleague,    Thank you for referring your patient, Mira Luis, to the [facility]. Please see a copy of my visit note below.    S: Mira Luis  presents 1 week post op.      No complaints today.  She is feeling better after discontinuing the enoxaparin.  I spoke to her on the phone yesterday.  She is wearing a cam walker and using crutches.  Her pain is controlled.    POSTOPERATIVE DIAGNOSES:    Hallux limitus with degenerative joint disease, left first metatarsophalangeal joint.     PROCEDURE:  Cheilectomy, left first metatarsophalangeal joint.    INDICATIONS FOR PROCEDURE:  Mira Luis is a 51-year-old female who saw me previously in clinic for ongoing and progressive pain associated with her left foot first metatarsophalangeal joint.  Clinical exam was consistent with hallux limitus and x-ray demonstrated fairly significant degenerative joint disease. She inquired about surgical intervention.  Conservative cares did not adequately relieve her pain. There was enough degenerative joint disease that osteotomy was not indicated in my opinion.  We discussed the option of cheilectomy versus primary fusion of the joint.  After discussion and careful consideration, she elected to proceed with a cheilectomy, understanding that she might need joint fusion in the future.  The procedure was discussed in detail including the risks, the benefits, the postoperative cares, course and prognosis.  For details regarding the most recent exam and discussion, please refer to the clinic note from 09/10/2021.       O:   Vascular:  Pedal pulses are palpable.  Moderate left forefoot edema.    Neuro: Light touch sensation is intact distal to the incision.    Derm: The incision is well coapted.  Sutures are intact.  No bonnie-incisional erythema.     ASSESSMENT:  No clinical signs of infection.  Pain is  controlled.  Encounter Diagnosis   Name Primary?     Surgery follow-up examination Yes     PLAN:  sterile redress of the left foot.  crutches can be used for pain control yet not needed.  She is able to ambulate in the cam walker.  We reviewed the preoperative and postoperative x-ray images.  She is to continue with low activity and elevation.  Per her request, handicap parking sticker request form was provided.  Follow-up as scheduled next week.    Arturo Harris DPM;        Again, thank you for allowing me to participate in the care of your patient.        Sincerely,        Arturo Harris DPM

## 2021-10-26 NOTE — PROGRESS NOTES
S: Mira Luis  presents 1 week post op.      No complaints today.  She is feeling better after discontinuing the enoxaparin.  I spoke to her on the phone yesterday.  She is wearing a cam walker and using crutches.  Her pain is controlled.    POSTOPERATIVE DIAGNOSES:    Hallux limitus with degenerative joint disease, left first metatarsophalangeal joint.     PROCEDURE:  Cheilectomy, left first metatarsophalangeal joint.    INDICATIONS FOR PROCEDURE:  Mira Luis is a 51-year-old female who saw me previously in clinic for ongoing and progressive pain associated with her left foot first metatarsophalangeal joint.  Clinical exam was consistent with hallux limitus and x-ray demonstrated fairly significant degenerative joint disease. She inquired about surgical intervention.  Conservative cares did not adequately relieve her pain. There was enough degenerative joint disease that osteotomy was not indicated in my opinion.  We discussed the option of cheilectomy versus primary fusion of the joint.  After discussion and careful consideration, she elected to proceed with a cheilectomy, understanding that she might need joint fusion in the future.  The procedure was discussed in detail including the risks, the benefits, the postoperative cares, course and prognosis.  For details regarding the most recent exam and discussion, please refer to the clinic note from 09/10/2021.       O:   Vascular:  Pedal pulses are palpable.  Moderate left forefoot edema.    Neuro: Light touch sensation is intact distal to the incision.    Derm: The incision is well coapted.  Sutures are intact.  No bonnie-incisional erythema.     ASSESSMENT:  No clinical signs of infection.  Pain is controlled.  Encounter Diagnosis   Name Primary?     Surgery follow-up examination Yes     PLAN:  sterile redress of the left foot.  crutches can be used for pain control yet not needed.  She is able to ambulate in the cam walker.  We reviewed the preoperative and  postoperative x-ray images.  She is to continue with low activity and elevation.  Per her request, handicap parking sticker request form was provided.  Follow-up as scheduled next week.    Arturo Harris DPM;

## 2021-11-04 ENCOUNTER — OFFICE VISIT (OUTPATIENT)
Dept: PODIATRY | Facility: CLINIC | Age: 51
End: 2021-11-04
Payer: COMMERCIAL

## 2021-11-04 VITALS — WEIGHT: 214.4 LBS | BODY MASS INDEX: 34.46 KG/M2 | HEIGHT: 66 IN

## 2021-11-04 DIAGNOSIS — Z09 SURGERY FOLLOW-UP EXAMINATION: Primary | ICD-10-CM

## 2021-11-04 PROCEDURE — 99024 POSTOP FOLLOW-UP VISIT: CPT | Performed by: PODIATRIST

## 2021-11-04 ASSESSMENT — MIFFLIN-ST. JEOR: SCORE: 1609.26

## 2021-11-04 NOTE — LETTER
"    11/4/2021         RE: Mria Luis  [address] [place] [address]        Dear Colleague,    Thank you for referring your patient, Mira Luis, to the [facility]. Please see a copy of my visit note below.    S: Mira Luis  presents 2 weeks post op.      No complaints today.  She admits to being overactive on a couple occasions and \"paying the price.\"    POSTOPERATIVE DIAGNOSES:    Hallux limitus with degenerative joint disease, left first metatarsophalangeal joint.     PROCEDURE:  Cheilectomy, left first metatarsophalangeal joint.    INDICATIONS FOR PROCEDURE:  Mira Luis is a 51-year-old female who saw me previously in clinic for ongoing and progressive pain associated with her left foot first metatarsophalangeal joint.  Clinical exam was consistent with hallux limitus and x-ray demonstrated fairly significant degenerative joint disease. She inquired about surgical intervention.  Conservative cares did not adequately relieve her pain. There was enough degenerative joint disease that osteotomy was not indicated in my opinion.  We discussed the option of cheilectomy versus primary fusion of the joint.  After discussion and careful consideration, she elected to proceed with a cheilectomy, understanding that she might need joint fusion in the future.  The procedure was discussed in detail including the risks, the benefits, the postoperative cares, course and prognosis.  For details regarding the most recent exam and discussion, please refer to the clinic note from 09/10/2021.       O:   Vascular:  Pedal pulses are palpable.  Moderate left forefoot edema.    Neuro: Light touch sensation is intact distal to the incision.    Derm: The incision is well coapted.  Sutures are intact.  No bonnie-incisional erythema.     ASSESSMENT:  Encounter Diagnosis   Name Primary?     Surgery follow-up examination Yes       PLAN:  Suture Removal    The incision was prepped with povodine " iodine solution.  Using a sterile suture scissors and forceps, the sutures were removed w/o difficulty.  Incision cleansed with an alcohol wipe and a light dressing was applied.  Pt advised to keep foot dry for an additional 24 hours, and then washing the foot is okay.  Pt stated understanding.    She is to continue with the cam walker for another 2 weeks.  She can then transition out of the Cam walker into a supportive rigid soled shoe.  This was discussed.    Follow-up in 4 weeks for x-ray and check of status.    Arturo Harris DPM;        Again, thank you for allowing me to participate in the care of your patient.        Sincerely,        Arturo Harris DPM

## 2021-11-04 NOTE — PROGRESS NOTES
"S: Mira Luis  presents 2 weeks post op.      No complaints today.  She admits to being overactive on a couple occasions and \"paying the price.\"    POSTOPERATIVE DIAGNOSES:    Hallux limitus with degenerative joint disease, left first metatarsophalangeal joint.     PROCEDURE:  Cheilectomy, left first metatarsophalangeal joint.    INDICATIONS FOR PROCEDURE:  Mira Luis is a 51-year-old female who saw me previously in clinic for ongoing and progressive pain associated with her left foot first metatarsophalangeal joint.  Clinical exam was consistent with hallux limitus and x-ray demonstrated fairly significant degenerative joint disease. She inquired about surgical intervention.  Conservative cares did not adequately relieve her pain. There was enough degenerative joint disease that osteotomy was not indicated in my opinion.  We discussed the option of cheilectomy versus primary fusion of the joint.  After discussion and careful consideration, she elected to proceed with a cheilectomy, understanding that she might need joint fusion in the future.  The procedure was discussed in detail including the risks, the benefits, the postoperative cares, course and prognosis.  For details regarding the most recent exam and discussion, please refer to the clinic note from 09/10/2021.       O:   Vascular:  Pedal pulses are palpable.  Moderate left forefoot edema.    Neuro: Light touch sensation is intact distal to the incision.    Derm: The incision is well coapted.  Sutures are intact.  No bonnie-incisional erythema.     ASSESSMENT:  Encounter Diagnosis   Name Primary?     Surgery follow-up examination Yes       PLAN:  Suture Removal    The incision was prepped with povodine iodine solution.  Using a sterile suture scissors and forceps, the sutures were removed w/o difficulty.  Incision cleansed with an alcohol wipe and a light dressing was applied.  Pt advised to keep foot dry for an additional 24 hours, and then washing the " foot is okay.  Pt stated understanding.    She is to continue with the cam walker for another 2 weeks.  She can then transition out of the Cam walker into a supportive rigid soled shoe.  This was discussed.    Follow-up in 4 weeks for x-ray and check of status.    Arturo Harris DPM;

## 2021-12-02 ENCOUNTER — ANCILLARY PROCEDURE (OUTPATIENT)
Dept: GENERAL RADIOLOGY | Facility: CLINIC | Age: 51
End: 2021-12-02
Attending: PODIATRIST
Payer: COMMERCIAL

## 2021-12-02 ENCOUNTER — OFFICE VISIT (OUTPATIENT)
Dept: PODIATRY | Facility: CLINIC | Age: 51
End: 2021-12-02
Payer: COMMERCIAL

## 2021-12-02 VITALS
SYSTOLIC BLOOD PRESSURE: 122 MMHG | BODY MASS INDEX: 34.65 KG/M2 | WEIGHT: 215.6 LBS | DIASTOLIC BLOOD PRESSURE: 90 MMHG | HEIGHT: 66 IN

## 2021-12-02 DIAGNOSIS — Z09 SURGERY FOLLOW-UP EXAMINATION: ICD-10-CM

## 2021-12-02 DIAGNOSIS — Z09 SURGERY FOLLOW-UP EXAMINATION: Primary | ICD-10-CM

## 2021-12-02 PROCEDURE — 73630 X-RAY EXAM OF FOOT: CPT | Mod: LT | Performed by: RADIOLOGY

## 2021-12-02 PROCEDURE — 99024 POSTOP FOLLOW-UP VISIT: CPT | Performed by: PODIATRIST

## 2021-12-02 ASSESSMENT — MIFFLIN-ST. JEOR: SCORE: 1614.71

## 2021-12-02 NOTE — LETTER
12/2/2021         RE: Mira Luis  [address] [place] [address]        Dear Colleague,    Thank you for referring your patient, Mira Luis, to the [facility]. Please see a copy of my visit note below.    S: Mira Luis  presents 6 weeks post op.      Ongoing stiffness.  Some pain when trying to wear regular shoes. In CAM walker today.    POSTOPERATIVE DIAGNOSES:    Hallux limitus with degenerative joint disease, left first metatarsophalangeal joint.     PROCEDURE:  Cheilectomy, left first metatarsophalangeal joint.    INDICATIONS FOR PROCEDURE:  Mira Luis is a 51-year-old female who saw me previously in clinic for ongoing and progressive pain associated with her left foot first metatarsophalangeal joint.  Clinical exam was consistent with hallux limitus and x-ray demonstrated fairly significant degenerative joint disease. She inquired about surgical intervention.  Conservative cares did not adequately relieve her pain. There was enough degenerative joint disease that osteotomy was not indicated in my opinion.  We discussed the option of cheilectomy versus primary fusion of the joint.  After discussion and careful consideration, she elected to proceed with a cheilectomy, understanding that she might need joint fusion in the future.  The procedure was discussed in detail including the risks, the benefits, the postoperative cares, course and prognosis.  For details regarding the most recent exam and discussion, please refer to the clinic note from 09/10/2021.       O:   Vascular:  Pedal pulses are palpable.  Moderate left forefoot edema.    Neuro: Light touch sensation is intact distal to the incision.    Derm: The incision has healed.    ASSESSMENT:  Encounter Diagnosis   Name Primary?     Surgery follow-up examination Yes       PLAN:  I personally reviewed the x-ray images with Marie.  Stable postoperative findings.  At this point, she can wean out of the CAM  walker and continue to gradually increase her weightbearing activity.  I reviewed that stiffer soled shoes continue to be important, given the degenerative joint disease in the left first metatarsal joint.  We reviewed the possible need for arthrodesis in the future.    Follow-up on an as-needed basis at this point.      Arturo Harris DPM;        Again, thank you for allowing me to participate in the care of your patient.        Sincerely,        Arturo Harris DPM

## 2021-12-02 NOTE — PROGRESS NOTES
S: Mira Luis  presents 6 weeks post op.      Ongoing stiffness.  Some pain when trying to wear regular shoes. In CAM walker today.    POSTOPERATIVE DIAGNOSES:    Hallux limitus with degenerative joint disease, left first metatarsophalangeal joint.     PROCEDURE:  Cheilectomy, left first metatarsophalangeal joint.    INDICATIONS FOR PROCEDURE:  Mira Luis is a 51-year-old female who saw me previously in clinic for ongoing and progressive pain associated with her left foot first metatarsophalangeal joint.  Clinical exam was consistent with hallux limitus and x-ray demonstrated fairly significant degenerative joint disease. She inquired about surgical intervention.  Conservative cares did not adequately relieve her pain. There was enough degenerative joint disease that osteotomy was not indicated in my opinion.  We discussed the option of cheilectomy versus primary fusion of the joint.  After discussion and careful consideration, she elected to proceed with a cheilectomy, understanding that she might need joint fusion in the future.  The procedure was discussed in detail including the risks, the benefits, the postoperative cares, course and prognosis.  For details regarding the most recent exam and discussion, please refer to the clinic note from 09/10/2021.       O:   Vascular:  Pedal pulses are palpable.  Moderate left forefoot edema.    Neuro: Light touch sensation is intact distal to the incision.    Derm: The incision has healed.    ASSESSMENT:  Encounter Diagnosis   Name Primary?     Surgery follow-up examination Yes       PLAN:  I personally reviewed the x-ray images with Marie.  Stable postoperative findings.  At this point, she can wean out of the CAM walker and continue to gradually increase her weightbearing activity.  I reviewed that stiffer soled shoes continue to be important, given the degenerative joint disease in the left first metatarsal joint.  We reviewed the possible need for arthrodesis in  the future.    Follow-up on an as-needed basis at this point.      Arturo Harris DPM;

## 2022-05-26 ENCOUNTER — OFFICE VISIT (OUTPATIENT)
Dept: PODIATRY | Facility: CLINIC | Age: 52
End: 2022-05-26
Payer: COMMERCIAL

## 2022-05-26 VITALS — WEIGHT: 212 LBS | DIASTOLIC BLOOD PRESSURE: 70 MMHG | BODY MASS INDEX: 34.22 KG/M2 | SYSTOLIC BLOOD PRESSURE: 110 MMHG

## 2022-05-26 DIAGNOSIS — M25.572 PAIN IN JOINT OF LEFT FOOT: Primary | ICD-10-CM

## 2022-05-26 DIAGNOSIS — M19.072 OSTEOARTHRITIS OF FIRST METATARSOPHALANGEAL (MTP) JOINT OF LEFT FOOT: ICD-10-CM

## 2022-05-26 PROCEDURE — 99213 OFFICE O/P EST LOW 20 MIN: CPT | Performed by: PODIATRIST

## 2022-05-26 NOTE — LETTER
5/26/2022         RE: Mira Luis  [address] [place] [address]        Dear Colleague,    Thank you for referring your patient, Mira Luis, to the [facility]. Please see a copy of my visit note below.    ASSESSMENT:  Encounter Diagnoses   Name Primary?     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  We compared the images today to preop and postop images.  There is interval joint space narrowing suggesting interval degenerative joint disease.    I told her that I would not consider maximal medical improvement until at least 6 months postop.  She is approximately at 7 months.    She thinks there is ongoing slow improvement.    Am somewhat concerned about discomfort involving the distal left third metatarsal.  Nothing is seen on x-ray.  I explained that the stiff joint and her altered gait might be overloading this metatarsal causing a stress reaction of bone.  This would put her at risk for a stress fracture.    I encouraged her to continue with good support and stiffer soled shoes.    Follow-up on an as-needed basis, specifically if either area of pain is worsening.    I did discuss that the next step surgically would be first metatarsophalangeal joint arthrodesis.  That is what I can offer.  She certainly can seek out an opinion on joint implant as well.    Recommendations:  Stiff soled shoes  Good support  Ice and/or heat  NSAIDs  Topical Voltaren cream  Consider joint injection.    Disclaimer: This note consists of symbols derived from keyboarding, dictation and/or voice recognition software. As a result, there may be errors in the script that have gone undetected. Please consider this when interpreting information found in this chart.    Arturo Harris DPM, FACFAS, MS    [facility]/Foot & Ankle  Surgery      ____________________________________________________________________    HPI:       Marie presents today following up from a left foot cheilectomy 10/19/2021.  She reports some ongoing aching and throbbing.  Pain can be rated a 6 out of 10.  She has done some stretching of the joint.  She asked if she might be at her maximal medical improvement.  *  Past Medical History:   Diagnosis Date     AGW (anogenital warts)      Arthritis of first metatarsophalangeal (MTP) joint of left foot 7/30/2021     ASCUS (atypical squamous cells of undetermined significance) on Pap smear 11/11/13    neg HPV. plan to repeat pap/HPV in 1 year     CKD (chronic kidney disease)      Headache      Heart palpitations 10/1998     LSIL (low grade squamous intraepithelial lesion) on Pap smear 10/12/11, 6/2012 11/11/11 colp - LSIL     Lyme disease      Migraines Age 20's    Cluster     Obesity      Obesity      PONV (postoperative nausea and vomiting)      Reticulocytosis    *  *  Past Surgical History:   Procedure Laterality Date     CHEILECTOMY Left 10/19/2021    Procedure: LEFT FOOT CHEILECTOMY;  Surgeon: Arturo Harris DPM;  Location:  OR     COLONOSCOPY N/A 8/19/2021    Procedure: COLONOSCOPY;  Surgeon: Corwin Remy MD;  Location:  GI     EXCISE VULVA WIDE LOCAL  1/16/2013    Procedure: EXCISE VULVA WIDE LOCAL;  Vaginal wide local excision of the vulva.   Diagnosis: vulva chondyloma.;  Surgeon: Pretty Collado MD;  Location:  OR     EXCISE VULVA WIDE LOCAL       SURGICAL HISTORY OF -   2/2008    Fibula Fx   *  *  Current Outpatient Medications   Medication Sig Dispense Refill     BIESTROGEN, 20-80, 1.25 MG COMPOUND Apply 1 mg topically daily 50/50 1MG/GRAM APPLY 2 PUMPS TOPICALLY ONCE A DAY       brimonidine (ALPHAGAN-P) 0.15 % ophthalmic solution INSTILL 1 DROP INTO BOTH EYES TWICE DAILY AS DIRECTED       Elliot, Zingiber officinalis, (ELLIOT PO)        ibuprofen (ADVIL/MOTRIN) 600 MG tablet Take 1 tablet  (600 mg) by mouth every 6 hours as needed for moderate pain 28 tablet 1     progesterone (PROMETRIUM) 100 MG capsule Take 100 mg by mouth At Bedtime       UNABLE TO FIND TAYLOR 1000 mg/day       UNABLE TO FIND Melatonin Blend 100 g theanine, 5 mg melatonin       UNABLE TO FIND C+ Quercitin 1000 mg/day       UNABLE TO FIND Dessicated Liver 3000 mg/day       UNABLE TO FIND Valerian Root 1590 mg/day       UNABLE TO FIND GIANNI-e 400 mg/day           EXAM:    Vitals: /70 (BP Location: Right arm, Patient Position: Chair, Cuff Size: Adult Large)   Wt 96.2 kg (212 lb)   LMP 07/14/2020   BMI 34.22 kg/m    BMI: Body mass index is 34.22 kg/m .    Constitutional:  Mira Luis is in no apparent distress, appears well-nourished.  Cooperative with history and physical exam.    Vascular:  Pedal pulses are palpable for both the DP and PT arteries.  CFT < 3 sec. mild edema around the left first metatarsophalangeal joint.    Derm: Normal texture and turgor.  No erythema, ecchymosis, or cyanosis.  No open lesions.    Musculoskeletal:    Left first metatarsophalangeal joint range of motion is significantly limited, even without loading of the forefoot.  She has some pain on palpation of the distal aspect of the third metatarsal.    XR Left Foot:  See comments above       Again, thank you for allowing me to participate in the care of your patient.        Sincerely,        Arturo Harris DPM

## 2022-05-26 NOTE — PROGRESS NOTES
ASSESSMENT:  Encounter Diagnoses   Name Primary?     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  We compared the images today to preop and postop images.  There is interval joint space narrowing suggesting interval degenerative joint disease.    I told her that I would not consider maximal medical improvement until at least 6 months postop.  She is approximately at 7 months.    She thinks there is ongoing slow improvement.    Am somewhat concerned about discomfort involving the distal left third metatarsal.  Nothing is seen on x-ray.  I explained that the stiff joint and her altered gait might be overloading this metatarsal causing a stress reaction of bone.  This would put her at risk for a stress fracture.    I encouraged her to continue with good support and stiffer soled shoes.    Follow-up on an as-needed basis, specifically if either area of pain is worsening.    I did discuss that the next step surgically would be first metatarsophalangeal joint arthrodesis.  That is what I can offer.  She certainly can seek out an opinion on joint implant as well.    Recommendations:  Stiff soled shoes  Good support  Ice and/or heat  NSAIDs  Topical Voltaren cream  Consider joint injection.    Disclaimer: This note consists of symbols derived from keyboarding, dictation and/or voice recognition software. As a result, there may be errors in the script that have gone undetected. Please consider this when interpreting information found in this chart.    Arturo Harris DPM, FACFAS, MS    [facility]iat[facility]/Foot & Ankle Surgery      ____________________________________________________________________    HPI:       Marie presents today following up from a left foot cheilectomy 10/19/2021.  She reports some ongoing aching and throbbing.  Pain can be rated a 6 out of 10.  She has  done some stretching of the joint.  She asked if she might be at her maximal medical improvement.  *  Past Medical History:   Diagnosis Date     AGW (anogenital warts)      Arthritis of first metatarsophalangeal (MTP) joint of left foot 7/30/2021     ASCUS (atypical squamous cells of undetermined significance) on Pap smear 11/11/13    neg HPV. plan to repeat pap/HPV in 1 year     CKD (chronic kidney disease)      Headache      Heart palpitations 10/1998     LSIL (low grade squamous intraepithelial lesion) on Pap smear 10/12/11, 6/2012 11/11/11 colp - LSIL     Lyme disease      Migraines Age 20's    Cluster     Obesity      Obesity      PONV (postoperative nausea and vomiting)      Reticulocytosis    *  *  Past Surgical History:   Procedure Laterality Date     CHEILECTOMY Left 10/19/2021    Procedure: LEFT FOOT CHEILECTOMY;  Surgeon: Arturo Harris DPM;  Location: [facility] OR     COLONOSCOPY N/A 8/19/2021    Procedure: COLONOSCOPY;  Surgeon: Corwin Remy MD;  Location:  GI     EXCISE VULVA WIDE LOCAL  1/16/2013    Procedure: EXCISE VULVA WIDE LOCAL;  Vaginal wide local excision of the vulva.   Diagnosis: vulva chondyloma.;  Surgeon: Pretty Collado MD;  Location: [facility] OR     EXCISE VULVA WIDE LOCAL       SURGICAL HISTORY OF -   2/2008    Fibula Fx   *  *  Current Outpatient Medications   Medication Sig Dispense Refill     BIESTROGEN, 20-80, 1.25 MG COMPOUND Apply 1 mg topically daily 50/50 1MG/GRAM APPLY 2 PUMPS TOPICALLY ONCE A DAY       brimonidine (ALPHAGAN-P) 0.15 % ophthalmic solution INSTILL 1 DROP INTO BOTH EYES TWICE DAILY AS DIRECTED       Ginger, Zingiber officinalis, (GINGER PO)        ibuprofen (ADVIL/MOTRIN) 600 MG tablet Take 1 tablet (600 mg) by mouth every 6 hours as needed for moderate pain 28 tablet 1     progesterone (PROMETRIUM) 100 MG capsule Take 100 mg by mouth At Bedtime       UNABLE TO FIND TAYLOR 1000 mg/day       UNABLE TO FIND Melatonin Blend 100 g theanine, 5 mg melatonin        UNABLE TO FIND C+ Quercitin 1000 mg/day       UNABLE TO FIND Dessicated Liver 3000 mg/day       UNABLE TO FIND Valerian Root 1590 mg/day       UNABLE TO FIND GIANNI-e 400 mg/day           EXAM:    Vitals: /70 (BP Location: Right arm, Patient Position: Chair, Cuff Size: Adult Large)   Wt 96.2 kg (212 lb)   LMP 07/14/2020   BMI 34.22 kg/m    BMI: Body mass index is 34.22 kg/m .    Constitutional:  Mira Luis is in no apparent distress, appears well-nourished.  Cooperative with history and physical exam.    Vascular:  Pedal pulses are palpable for both the DP and PT arteries.  CFT < 3 sec. mild edema around the left first metatarsophalangeal joint.    Derm: Normal texture and turgor.  No erythema, ecchymosis, or cyanosis.  No open lesions.    Musculoskeletal:    Left first metatarsophalangeal joint range of motion is significantly limited, even without loading of the forefoot.  She has some pain on palpation of the distal aspect of the third metatarsal.    XR Left Foot:  See comments above

## 2022-07-19 ENCOUNTER — OFFICE VISIT (OUTPATIENT)
Dept: PODIATRY | Facility: CLINIC | Age: 52
End: 2022-07-19
Payer: COMMERCIAL

## 2022-07-19 VITALS
BODY MASS INDEX: 34.07 KG/M2 | HEIGHT: 66 IN | DIASTOLIC BLOOD PRESSURE: 78 MMHG | WEIGHT: 212 LBS | SYSTOLIC BLOOD PRESSURE: 118 MMHG

## 2022-07-19 DIAGNOSIS — M20.5X2 HALLUX LIMITUS, LEFT: ICD-10-CM

## 2022-07-19 DIAGNOSIS — G57.62 MORTON'S NEUROMA OF LEFT FOOT: ICD-10-CM

## 2022-07-19 DIAGNOSIS — M19.072 ARTHRITIS OF LEFT FOOT: ICD-10-CM

## 2022-07-19 DIAGNOSIS — M79.672 LEFT FOOT PAIN: Primary | ICD-10-CM

## 2022-07-19 PROCEDURE — 99213 OFFICE O/P EST LOW 20 MIN: CPT | Performed by: PODIATRIST

## 2022-07-19 RX ORDER — DICLOFENAC SODIUM 75 MG/1
75 TABLET, DELAYED RELEASE ORAL 2 TIMES DAILY
Qty: 28 TABLET | Refills: 0 | Status: SHIPPED | OUTPATIENT
Start: 2022-07-19 | End: 2023-07-24

## 2022-07-19 NOTE — LETTER
7/19/2022         RE: Mira Luis  [address] [place] [address]        Dear Colleague,    Thank you for referring your patient, Mira Luis, to the [facility]. Please see a copy of my visit note below.    PATIENT HISTORY:  Mira Luis is a 52 year old female who presents to clinic for pain to the left foot.  Notes that its been going on for the last few weeks.  She put a boot on today notes that that has helped with the pain.  She underwent cheilectomy with Dr. Sosa about 6 months ago.  Notes that the pain she is having is under the little toes and is very sharp and severe with pressure.  Also notes pain to the left great toe where she had the surgery.  Notes is more achy.  Pain can be 8 out of 10 at its worst.  Worse with increased activity or standing.  She tried elevating icing and ibuprofen however it has not really helped much.  That seems to help some with the pain.  She is wondering if she had stress fracture in the foot.  Here with her .  Denies specific injury.    Review of Systems:  Patient denies fever, chills, rash, wound, stiffness,, numbness, weakness, heart burn, blood in stool, chest pain with activity, calf pain when walking, shortness of breath with activity, chronic cough, easy bleeding/bruising, swelling of ankles, excessive thirst, fatigue, depression, anxiety.  Patient admits to limping.     PAST MEDICAL HISTORY:   Past Medical History:   Diagnosis Date     AGW (anogenital warts)      Arthritis of first metatarsophalangeal (MTP) joint of left foot 7/30/2021     ASCUS (atypical squamous cells of undetermined significance) on Pap smear 11/11/13    neg HPV. plan to repeat pap/HPV in 1 year     CKD (chronic kidney disease)      Headache      Heart palpitations 10/1998     LSIL (low grade squamous intraepithelial lesion) on Pap smear 10/12/11, 6/2012 11/11/11 colp - LSIL     Lyme disease      Migraines Age 20's    Cluster     Obesity       Obesity      PONV (postoperative nausea and vomiting)      Reticulocytosis         PAST SURGICAL HISTORY:   Past Surgical History:   Procedure Laterality Date     CHEILECTOMY Left 10/19/2021    Procedure: LEFT FOOT CHEILECTOMY;  Surgeon: Arturo Harris DPM;  Location: [facility] OR     COLONOSCOPY N/A 8/19/2021    Procedure: COLONOSCOPY;  Surgeon: Corwin Remy MD;  Location: [facility]     EXCISE VULVA WIDE LOCAL  1/16/2013    Procedure: EXCISE VULVA WIDE LOCAL;  Vaginal wide local excision of the vulva.   Diagnosis: vulva chondyloma.;  Surgeon: Pretty Collado MD;  Location: [facility] OR     EXCISE VULVA WIDE LOCAL       SURGICAL HISTORY OF -   2/2008    Fibula Fx        MEDICATIONS:   Current Outpatient Medications:      BIESTROGEN, 20-80, 1.25 MG COMPOUND, Apply 1 mg topically daily 50/50 1MG/GRAM APPLY 2 PUMPS TOPICALLY ONCE A DAY, Disp: , Rfl:      ibuprofen (ADVIL/MOTRIN) 600 MG tablet, Take 1 tablet (600 mg) by mouth every 6 hours as needed for moderate pain, Disp: 28 tablet, Rfl: 1     progesterone (PROMETRIUM) 100 MG capsule, Take 100 mg by mouth At Bedtime, Disp: , Rfl:      UNABLE TO FIND, TAYLOR 1000 mg/day, Disp: , Rfl:      UNABLE TO FIND, Valerian Root 1590 mg/day, Disp: , Rfl:      UNABLE TO FIND, GIANNI-e 400 mg/day, Disp: , Rfl:      brimonidine (ALPHAGAN-P) 0.15 % ophthalmic solution, INSTILL 1 DROP INTO BOTH EYES TWICE DAILY AS DIRECTED, Disp: , Rfl:      Ginger, Zingiber officinalis, (GINGER PO), , Disp: , Rfl:      UNABLE TO FIND, Melatonin Blend 100 g theanine, 5 mg melatonin, Disp: , Rfl:      UNABLE TO FIND, C+ Quercitin 1000 mg/day, Disp: , Rfl:      UNABLE TO FIND, Dessicated Liver 3000 mg/day, Disp: , Rfl:      ALLERGIES:    Allergies   Allergen Reactions     Shellfish Allergy      Throat closed     Sulfa Drugs         SOCIAL HISTORY:   Social History     Socioeconomic History     Marital status:      Spouse name: Not on file     Number of children: Not on file     Years of education:  "Not on file     Highest education level: Bachelor's degree (e.g., BA, AB, BS)   Occupational History     Not on file   Tobacco Use     Smoking status: Never Smoker     Smokeless tobacco: Never Used   Substance and Sexual Activity     Alcohol use: Not Currently     Drug use: No     Sexual activity: Yes     Partners: Male     Comment: Aviane   Other Topics Concern     Parent/sibling w/ CABG, MI or angioplasty before 65F 55M? Not Asked      Service No     Blood Transfusions No     Caffeine Concern No     Occupational Exposure No     Hobby Hazards No     Sleep Concern No     Stress Concern No     Weight Concern No     Special Diet No     Back Care No     Exercise Yes     Bike Helmet No     Seat Belt Yes     Self-Exams Yes   Social History Narrative     Not on file     Social Determinants of Health     Financial Resource Strain: Not on file   Food Insecurity: Not on file   Transportation Needs: Not on file   Physical Activity: Not on file   Stress: Not on file   Social Connections: Not on file   Intimate Partner Violence: Not on file   Housing Stability: Not on file        FAMILY HISTORY:   Family History   Problem Relation Age of Onset     Ulcerative Colitis Mother      Arrhythmia Mother      Hypertension Maternal Grandmother      Cancer Maternal Aunt      Cancer Paternal Aunt      Diabetes No family hx of      Cerebrovascular Disease No family hx of      Thyroid Disease No family hx of      Glaucoma No family hx of      Macular Degeneration No family hx of      Colon Cancer No family hx of         EXAM:Vitals: /78   Ht 1.676 m (5' 6\")   Wt 96.2 kg (212 lb)   LMP 07/14/2020   BMI 34.22 kg/m    BMI= Body mass index is 34.22 kg/m .    General appearance: Patient is alert and fully cooperative with history & exam.  No sign of distress is noted during the visit.     Psychiatric: Affect is pleasant & appropriate.  Patient appears motivated to improve health.     Respiratory: Breathing is regular & unlabored " while sitting.     HEENT: Hearing is intact to spoken word.  Speech is clear.  No gross evidence of visual impairment that would impact ambulation.     Dermatologic: Skin is intact to both lower extremities without significant lesions, rash or abrasion.  No paronychia or evidence of soft tissue infection is noted.     Vascular: DP & PT pulses are intact & regular bilaterally.  No significant edema or varicosities noted.  CFT and skin temperature is normal to both lower extremities.     Neurologic: Lower extremity sensation is intact to light touch.  No evidence of weakness or contracture in the lower extremities.  No evidence of neuropathy.     Musculoskeletal: Patient is ambulatory with short Aircast boot.  Pain on palpation of the left third intermetatarsal space.  Pain and decreased range of motion with stiffness to the left first metatarsal phalangeal joint.    Radiographs: left foot xray -  I personally reviewed the xrays.  Degenerative changes noted to the first metatarsal phalangeal joint.  No fractures are noted.     ASSESSMENT:    Left foot pain  Hallux limitus, left  Arthritis of left foot  Pena's neuroma of left foot     Medical Decision Making/Plan:  Reviewed [facility] in [facility].  Reviewed and discussed x-rays with patient.  Reviewed and discussed causes of hallux limitus with patient.  Explained that it is a progressive arthritis meaning that over time, there is decrease in the joint space as well as bony spurring that occurs which leads to pain in the big toe especially with bending motions of the big toe joint.    Discussed treatment options with patient including rigid soled shoes or orthotics that are stiff under the big toe that help to prevent motion at that joint which is leading to pain and inflammation.  We discussed that sometimes cortisone injections can help with the pain or physical therapy treatments such as ultrasound.  Discussed that this is normally a structural issue in the foot  and if conservative therapy doesn't work, surgery is considered.    We discussed that depending on the quality of the cartilage and/or of the joint determines if patient will need a joint sparing or fusion procedure.  Discussed that this can usually not be determined by x-ray and is an intra- op position.  With joint sparing procedure, and implant is placed in the joint to try to help keep the range of motion at that the toe. Patient is normally minimally weight bearing in a cam boot for 3 weeks.  With fusion, patient is normally non weight bearing for 2 weeks, then minimal weight bearing for 4 weeks in boot.     She is not interested in further surgery at this time for infusion.  We also talked about possible joint replacement with Arthrosurface with Dr. Tavo Rahman at [facility].  They are going to think about that.  They are to try topical pain cream and continue to use the boot for the next few weeks to try to offload the area.     We discussed causes and treatments of neuromas.  These included shoe gear, orthotics, metatarsal pads, cortisone injections, ice, physical therapy, and possible surgical removal.    They will continue to use the boot for the next few weeks.  We will use topical pain cream.  Was given stronger oral anti-inflammatory to see if this would help with pain as well.  If pain does not improve in 3 weeks recommend an MRI of the left foot.  If it does improve can transition to shoes with the insert with a metatarsal pad.  All questions were answered to patient's satisfaction and they will call for the questions or concerns      Patient risk factor: Patient is at low risk for infection.        Aminata Hull DPM, Podiatry/Foot and Ankle Surgery        Again, thank you for allowing me to participate in the care of your patient.        Sincerely,        Aminata Hull DPM, Podiatry/Foot and Ankle Surgery

## 2022-07-19 NOTE — PROGRESS NOTES
PATIENT HISTORY:  Mira Luis is a 52 year old female who presents to clinic for pain to the left foot.  Notes that its been going on for the last few weeks.  She put a boot on today notes that that has helped with the pain.  She underwent cheilectomy with Dr. Sosa about 6 months ago.  Notes that the pain she is having is under the little toes and is very sharp and severe with pressure.  Also notes pain to the left great toe where she had the surgery.  Notes is more achy.  Pain can be 8 out of 10 at its worst.  Worse with increased activity or standing.  She tried elevating icing and ibuprofen however it has not really helped much.  That seems to help some with the pain.  She is wondering if she had stress fracture in the foot.  Here with her .  Denies specific injury.    Review of Systems:  Patient denies fever, chills, rash, wound, stiffness,, numbness, weakness, heart burn, blood in stool, chest pain with activity, calf pain when walking, shortness of breath with activity, chronic cough, easy bleeding/bruising, swelling of ankles, excessive thirst, fatigue, depression, anxiety.  Patient admits to limping.     PAST MEDICAL HISTORY:   Past Medical History:   Diagnosis Date     AGW (anogenital warts)      Arthritis of first metatarsophalangeal (MTP) joint of left foot 7/30/2021     ASCUS (atypical squamous cells of undetermined significance) on Pap smear 11/11/13    neg HPV. plan to repeat pap/HPV in 1 year     CKD (chronic kidney disease)      Headache      Heart palpitations 10/1998     LSIL (low grade squamous intraepithelial lesion) on Pap smear 10/12/11, 6/2012 11/11/11 colp - LSIL     Lyme disease      Migraines Age 20's    Cluster     Obesity      Obesity      PONV (postoperative nausea and vomiting)      Reticulocytosis         PAST SURGICAL HISTORY:   Past Surgical History:   Procedure Laterality Date     CHEILECTOMY Left 10/19/2021    Procedure: LEFT FOOT CHEILECTOMY;  Surgeon: Arturo Harris  SANJEEV Silva;  Location: [facility] OR     COLONOSCOPY N/A 8/19/2021    Procedure: COLONOSCOPY;  Surgeon: Corwin Remy MD;  Location:  GI     EXCISE VULVA WIDE LOCAL  1/16/2013    Procedure: EXCISE VULVA WIDE LOCAL;  Vaginal wide local excision of the vulva.   Diagnosis: vulva chondyloma.;  Surgeon: Pretty Collado MD;  Location: [facility] OR     EXCISE VULVA WIDE LOCAL       SURGICAL HISTORY OF -   2/2008    Fibula Fx        MEDICATIONS:   Current Outpatient Medications:      BIESTROGEN, 20-80, 1.25 MG COMPOUND, Apply 1 mg topically daily 50/50 1MG/GRAM APPLY 2 PUMPS TOPICALLY ONCE A DAY, Disp: , Rfl:      ibuprofen (ADVIL/MOTRIN) 600 MG tablet, Take 1 tablet (600 mg) by mouth every 6 hours as needed for moderate pain, Disp: 28 tablet, Rfl: 1     progesterone (PROMETRIUM) 100 MG capsule, Take 100 mg by mouth At Bedtime, Disp: , Rfl:      UNABLE TO FIND, TAYLOR 1000 mg/day, Disp: , Rfl:      UNABLE TO FIND, Valerian Root 1590 mg/day, Disp: , Rfl:      UNABLE TO FIND, GIANNI-e 400 mg/day, Disp: , Rfl:      brimonidine (ALPHAGAN-P) 0.15 % ophthalmic solution, INSTILL 1 DROP INTO BOTH EYES TWICE DAILY AS DIRECTED, Disp: , Rfl:      Ginger, Zingiber officinalis, (GINGER PO), , Disp: , Rfl:      UNABLE TO FIND, Melatonin Blend 100 g theanine, 5 mg melatonin, Disp: , Rfl:      UNABLE TO FIND, C+ Quercitin 1000 mg/day, Disp: , Rfl:      UNABLE TO FIND, Dessicated Liver 3000 mg/day, Disp: , Rfl:      ALLERGIES:    Allergies   Allergen Reactions     Shellfish Allergy      Throat closed     Sulfa Drugs         SOCIAL HISTORY:   Social History     Socioeconomic History     Marital status:      Spouse name: Not on file     Number of children: Not on file     Years of education: Not on file     Highest education level: Bachelor's degree (e.g., BA, AB, BS)   Occupational History     Not on file   Tobacco Use     Smoking status: Never Smoker     Smokeless tobacco: Never Used   Substance and Sexual Activity     Alcohol use: Not  "Currently     Drug use: No     Sexual activity: Yes     Partners: Male     Comment: Aviane   Other Topics Concern     Parent/sibling w/ CABG, MI or angioplasty before 65F 55M? Not Asked      Service No     Blood Transfusions No     Caffeine Concern No     Occupational Exposure No     Hobby Hazards No     Sleep Concern No     Stress Concern No     Weight Concern No     Special Diet No     Back Care No     Exercise Yes     Bike Helmet No     Seat Belt Yes     Self-Exams Yes   Social History Narrative     Not on file     Social Determinants of Health     Financial Resource Strain: Not on file   Food Insecurity: Not on file   Transportation Needs: Not on file   Physical Activity: Not on file   Stress: Not on file   Social Connections: Not on file   Intimate Partner Violence: Not on file   Housing Stability: Not on file        FAMILY HISTORY:   Family History   Problem Relation Age of Onset     Ulcerative Colitis Mother      Arrhythmia Mother      Hypertension Maternal Grandmother      Cancer Maternal Aunt      Cancer Paternal Aunt      Diabetes No family hx of      Cerebrovascular Disease No family hx of      Thyroid Disease No family hx of      Glaucoma No family hx of      Macular Degeneration No family hx of      Colon Cancer No family hx of         EXAM:Vitals: /78   Ht 1.676 m (5' 6\")   Wt 96.2 kg (212 lb)   LMP 07/14/2020   BMI 34.22 kg/m    BMI= Body mass index is 34.22 kg/m .    General appearance: Patient is alert and fully cooperative with history & exam.  No sign of distress is noted during the visit.     Psychiatric: Affect is pleasant & appropriate.  Patient appears motivated to improve health.     Respiratory: Breathing is regular & unlabored while sitting.     HEENT: Hearing is intact to spoken word.  Speech is clear.  No gross evidence of visual impairment that would impact ambulation.     Dermatologic: Skin is intact to both lower extremities without significant lesions, rash or " abrasion.  No paronychia or evidence of soft tissue infection is noted.     Vascular: DP & PT pulses are intact & regular bilaterally.  No significant edema or varicosities noted.  CFT and skin temperature is normal to both lower extremities.     Neurologic: Lower extremity sensation is intact to light touch.  No evidence of weakness or contracture in the lower extremities.  No evidence of neuropathy.     Musculoskeletal: Patient is ambulatory with short Aircast boot.  Pain on palpation of the left third intermetatarsal space.  Pain and decreased range of motion with stiffness to the left first metatarsal phalangeal joint.    Radiographs: left foot xray -  I personally reviewed the xrays.  Degenerative changes noted to the first metatarsal phalangeal joint.  No fractures are noted.     ASSESSMENT:    Left foot pain  Hallux limitus, left  Arthritis of left foot  Pena's neuroma of left foot     Medical Decision Making/Plan:  Reviewed patient's chart in [facility].  Reviewed and discussed x-rays with patient.  Reviewed and discussed causes of hallux limitus with patient.  Explained that it is a progressive arthritis meaning that over time, there is decrease in the joint space as well as bony spurring that occurs which leads to pain in the big toe especially with bending motions of the big toe joint.    Discussed treatment options with patient including rigid soled shoes or orthotics that are stiff under the big toe that help to prevent motion at that joint which is leading to pain and inflammation.  We discussed that sometimes cortisone injections can help with the pain or physical therapy treatments such as ultrasound.  Discussed that this is normally a structural issue in the foot and if conservative therapy doesn't work, surgery is considered.    We discussed that depending on the quality of the cartilage and/or of the joint determines if patient will need a joint sparing or fusion procedure.  Discussed that this can  usually not be determined by x-ray and is an intra- op position.  With joint sparing procedure, and implant is placed in the joint to try to help keep the range of motion at that the toe. Patient is normally minimally weight bearing in a cam boot for 3 weeks.  With fusion, patient is normally non weight bearing for 2 weeks, then minimal weight bearing for 4 weeks in boot.     She is not interested in further surgery at this time for infusion.  We also talked about possible joint replacement with Arthrosurface with Dr. Tavo Rahman at [facility].  They are going to think about that.  They are to try topical pain cream and continue to use the boot for the next few weeks to try to offload the area.     We discussed causes and treatments of neuromas.  These included shoe gear, orthotics, metatarsal pads, cortisone injections, ice, physical therapy, and possible surgical removal.    They will continue to use the boot for the next few weeks.  We will use topical pain cream.  Was given stronger oral anti-inflammatory to see if this would help with pain as well.  If pain does not improve in 3 weeks recommend an MRI of the left foot.  If it does improve can transition to shoes with the insert with a metatarsal pad.  All questions were answered to patient's satisfaction and they will call for the questions or concerns      Patient risk factor: Patient is at low risk for infection.        Aminata Hull DPM, Podiatry/Foot and Ankle Surgery

## 2022-07-19 NOTE — PATIENT INSTRUCTIONS
"Thank you for choosing [facility] / Foot & Ankle Surgery!    DR BERMAN'S CLINIC:  [facility]   [address]   [place] [address]      TRIAGE LINE: [phone]  APPOINTMENTS: [phone]  RADIOLOGY: [phone]  SET UP SURGERY: [phone]  FAX NUMBER: [phone]  BILLING QUESTIONS: [phone]       Follow up: Call if not improved in 3 weeks for MRI    New Balance Inserts with Metatarsal Pad    DEGENERATIVE ARTHRITIS OF THE BIG TOE JOINT   (hallux limitus/hallux rigidus)   Arthritis of the joint at the base of the big toe (metatarsophalangeal joint) has several causes. Usually it results from repetitive trauma to the joint, secondary to abnormal foot mechanics. Often it is hereditary. However, a one-time traumatic event can lead to arthritis. The condition doesn't improve with time, and even with treatment, can worsen. The cartilage wears out, joint surfaces are no longer smooth, bone rubs on bone, inflammation occurs with pain, and eventually bone spurs and loose fragments might develop.   The joint is often painful with activity, worse with flimsy shoes or walking barefoot, and it slowly progresses over time. A person might notice the toe \"locking up\" with walking. There often is an obvious, and irritating, bony bump on top of the foot. Shoes might be uncomfortable. In some people the pain is so bothersome that recreational activities sometimes even normal daily activities are difficult to perform.   The pain from this arthritis is likely a combination of joint jamming, cartilage loss and inflammation, and irritation from shoes rubbing on the bump. Sometimes other parts of the foot, leg, or back hurt from altering one's walk to compensate for the painful joint.     Ways to help a person live with the discomfort include wearing a good, supportive shoe with a rigid, rocker-type bottom. An example is a hiking boot. A rigid sole minimizes bending of the joint, and " therefore, joint motion and pain. Shoes with a high toe box allow for less rubbing on the bump. Avoiding barefoot walking, sandals, flip-flops and slippers usually helps.   Sometimes an insert or orthotic provides symptom relief. This might make shoe fit more difficult. Pads over the bump and occasionally injections into the joint provide relief.   Surgery for this condition is aimed towards alleviating pain. It does not cure the arthritis nor does it guarantee better joint motion. Depending on the condition of the metatarsophalangeal joint, there are several surgiqal options:    1.  Cutting off the bony bump(s) and cleaning the joint    2.  Loosening the joint up by making cuts in the first metatarsal bone or the big toe bone and removing a small section of bone.    3.  Repositioning bone to minimize jamming of the joint.    4.  In severe cases, the joint is fused. By fusing the joint, it will never bend again. This resolves the pain, because it's the movement of a worn out joint that causes pain. Oftentimes the operation involves a combination of these procedures and. requires the use of screws, pins, and/or a small surgical plate.     Healing after surgery requires about six weeks of protection. This allows the bone to heal. Maximum recovery takes about one year. The scar tissue and joint structures require this amount of time to finish the healing process. Expect stiffness, swelling and numbness during that time frame.   Surgery for arthritis of the metatarsophalangeal joint does involve side effects. Some side effects are predictable and others are less common but do occur. A scar will be visible and could be irritated by shoes. The shoe may rub on the screw or internal pin requiring surgical removal of these fixation devices. The screw and pin would likely be left in place for a full year. The first toe may remain stiff after surgery. The amount of stiffness is variable. Most people never regain normal motion  of the first toe. This is due to scar tissue inherent to any surgery, in addition to the cumUlative effects of arthritis. Sometimes the big toe drifts to one side or the other. Joint fusion is one option to correct an unstable, drifting toe. This procedure straightens the toe, however, no motion remains.   All surgical procedures involve risk of infection, numbness, pain, delayed bone healing, osteotomy (bone cut) dislocation, blood clots, continued foot pain, etc. Arthritic joint surgery is quite complex and should not be taken lightly.    Any skin incision can lead to infection. Deep infection might involve the bone and thus repeat surgery and six weeks of IV antibiotics. Scar tissue can cause nerve pain or numbness. his is generally temporary but can be permanent. We do not have treatments that cure nerve problems. Second toe pain could be related to altered mechanics and pressure transferred to the second toe. Delayed bone healing would lengthen the healing time. Some bones simply do not heal. This requires repeat surgery, electronic bone stimulation and/or extended protection. Smokers have an approximate 20% chance of poor bone healing. This is double that of a non-smoker. The bone cut may displace. This may need to be repaired with a second operation. Displacement can cause joint malalignment. Immobility after surgery can cause a blood clot in the legs and lungs. This could result in death.   Foot pain is complex. Most feet hurt for more than one reason. Operating on the arthritic   big toe joint will not necessarily create a pain free foot. Appropriate shoes, healthy body weight, avoidance of bare foot walking and moderation of activity will always be   necessary to enjoy foot comfort. Arthritis is incurable even with surgery.     Surgery for this type of arthritis is nevertheless quite successful. Most surgical patients are pleased with their foot following surgery. Many of the issues described above can be  controlled by taking proper care of your foot during the healing process.   Cosmetic bump surgery is discouraged for the reasons listed above. A bump and joint that is comfortable when wearing appropriate shoes should simply be treated with appropriate shoes.   Your surgeon would be happy to fully describe any of the above issues. You should pursue a full understanding of the operation, recovery process and any potential problems that could develop.      PENA'S NEUROMA   Pena's neuroma is an enlargement or thickening of a nerve in the foot. It is also sometimes referred to as an intermetatarsal neuroma, interdigital neuroma, Pena's metatarsalgia (pain in the metatarsal head area), bonnie-neural fibrosis (scar tissue around a nerve) or entrapment neuropathy (abnormal nerve due to compression). A Pena's neuroma most commonly occurs in the third interspace between the third and fourth toes, followed by the second interspace between the second and third toes. Pena's neuromas have also occurred in the fourth and first interspaces, but these are rare. If you have a Pena's neuroma, there is a 15% chance it will occur bilaterally (on both feet). Pena's neuromas occur most commonly in women who are between 30 to 50 years old. The reason they are more common in women is thought to be due to the shoes women wear.   CAUSES: A Pena's neuroma is thought to be caused by trauma to the nerve, but scientists are still not sure about the exact cause of the trauma. The trauma may be caused by the metatarsal heads, the deep transverse intermetatarsal ligament (holds the metatarsal heads together) or an intermetatarsal bursa (fluid-filled sac). All of these structures can cause compression/trauma on the nerve which initially causes swelling and injury in the nerve. Over time if the compression/trauma continues, the nerve repairs itself with very fibrous tissue that leads to enlargement and thickening of the nerve. Other  "causes of trauma to the nerve may include; overpronation (foot rolls inward), hypermobility (too much motion), cavo varus (high arch foot) and excessive dorsiflexion (toes bend upward) of the toes. These biomechanical (howthe foot moves) factors may cause trauma to the nerve with every step. If the nerve becomes irritated and enlarged then it takes up more space and gets even more compressed and irritated. It becomes a vicious cycle.   SIGNS & SYMPTOMS  - Pain (sharp, stabbing, throbbing, shooting)   - Numbness   - Tingling or \"pins & needles\"   - Burning   - Cramping   - Feeling that you are stepping on something or that something is in your shoe   - Initially the symptoms may happen once in a while, but as the condition gets worse, the symptoms may happen all of the time   - It usually feels better by taking off your shoe and massaging your foot     DIAGNOSIS: Your podiatrist will ask many questions about your signs and symptoms and will perform a physical exam. Some of the exams may include a web space compression test. This is done by squeezing the metatarsals together with one hand and using the thumb and index finger of the other hand to compress the affected web space to reproduce the pain/symptoms. A palpable click (Erwin's click) is usually present. This test may also cause pain to shoot into the toes and that is called a Tinel's sign. Dulce's test involves squeezing the metatarsals together and moving the toes up and down for 30 seconds. This will usually cause pain or it will bring on your other symptoms. Felix's sign is positive when you stand and the affected toes spread apart. A Pena's neuroma is usually diagnosed based on the history and physical exam findings, but sometimes other tests such as an x-ray, ultrasound or an MRI are needed.   TREATMENT  1.  Footwear Changes: Wear shoes that are wide and deep in the toe box so they  do not put pressure on your toes and metatarsals. Avoid wearing " high heels because they cause increased pressure on the ball of your foot (forefoot).    2.  Metatarsal Pads: These help to lift and separate the metatarsal heads to take pressure off of the nerve. They are placed just behind where you feel the pain, not on top of the painful spot.   3.  Activity modification: For example, you may try swimming instead of running until your symptoms go away.   4.  Taping   5.  Icing   6.  NSAIDs (anti-inflammatories): aleve, ibuprofen, etc.   7.  Arch Supports or Orthotics: These help to control some of the abnormal motion in your feet. The abnormal motion can lead to extra torque and pressure on the nerve.   8.  Physical Therapy  9.  Cortisone Injection: Helps to decrease the size of the irritated, enlarged nerve.   10.  Sclerosing Alcohol Injection: Helps to destroy the nerve chemically, which causes permanent numbness    SURGERY  If conservative treatment does not help surgery may be needed. Surgery may involve cutting out the nerve or cutting the intermetatarsalligament. Studies have shown surgery has an 80-85% success rate.  Will result in numbness    PREVENTION  -Avoid wearing narrow, pointed toe shoes, or high heels

## 2022-08-28 ENCOUNTER — HEALTH MAINTENANCE LETTER (OUTPATIENT)
Age: 52
End: 2022-08-28

## 2022-11-02 ENCOUNTER — OFFICE VISIT (OUTPATIENT)
Dept: FAMILY MEDICINE | Facility: CLINIC | Age: 52
End: 2022-11-02
Payer: COMMERCIAL

## 2022-11-02 ENCOUNTER — ANCILLARY PROCEDURE (OUTPATIENT)
Dept: MAMMOGRAPHY | Facility: CLINIC | Age: 52
End: 2022-11-02
Attending: NURSE PRACTITIONER
Payer: COMMERCIAL

## 2022-11-02 VITALS
SYSTOLIC BLOOD PRESSURE: 110 MMHG | DIASTOLIC BLOOD PRESSURE: 74 MMHG | WEIGHT: 218.7 LBS | BODY MASS INDEX: 35.15 KG/M2 | OXYGEN SATURATION: 98 % | HEART RATE: 87 BPM | TEMPERATURE: 98.5 F | RESPIRATION RATE: 14 BRPM | HEIGHT: 66 IN

## 2022-11-02 DIAGNOSIS — Z13.220 SCREENING FOR HYPERLIPIDEMIA: ICD-10-CM

## 2022-11-02 DIAGNOSIS — R73.01 IMPAIRED FASTING GLUCOSE: ICD-10-CM

## 2022-11-02 DIAGNOSIS — Z12.31 VISIT FOR SCREENING MAMMOGRAM: ICD-10-CM

## 2022-11-02 DIAGNOSIS — R00.2 PALPITATIONS: Primary | ICD-10-CM

## 2022-11-02 PROCEDURE — 77063 BREAST TOMOSYNTHESIS BI: CPT | Mod: TC | Performed by: RADIOLOGY

## 2022-11-02 PROCEDURE — 77067 SCR MAMMO BI INCL CAD: CPT | Mod: TC | Performed by: RADIOLOGY

## 2022-11-02 PROCEDURE — 36415 COLL VENOUS BLD VENIPUNCTURE: CPT | Performed by: PHYSICIAN ASSISTANT

## 2022-11-02 PROCEDURE — 99214 OFFICE O/P EST MOD 30 MIN: CPT | Performed by: PHYSICIAN ASSISTANT

## 2022-11-02 PROCEDURE — 80048 BASIC METABOLIC PNL TOTAL CA: CPT | Performed by: PHYSICIAN ASSISTANT

## 2022-11-02 PROCEDURE — 80061 LIPID PANEL: CPT | Performed by: PHYSICIAN ASSISTANT

## 2022-11-02 NOTE — PROGRESS NOTES
"  Assessment & Plan     Palpitations    Since they are occurring so frequently and often associated with stress, recommend Zio patch to evaluate for a fib versus PAC/PVC. Depending on results, will refer to cardiology. Recommend stress test due to symptoms and strong family history of heart disease.    - Adult Leadless EKG Monitor 8 to 14 Days; Future  - Exercise Stress Test - Adult; Future      Screening for hyperlipidemia    Screening. This can contribute to cardiac concerns as well.    - Lipid panel reflex to direct LDL Non-fasting; Future  - Lipid panel reflex to direct LDL Non-fasting      Impaired fasting glucose    Recheck BMP. She has had decreased GFR in the past as well so monitor.    - BASIC METABOLIC PANEL; Future  - BASIC METABOLIC PANEL             BMI:   Estimated body mass index is 35.3 kg/m  as calculated from the following:    Height as of this encounter: 1.676 m (5' 6\").    Weight as of this encounter: 99.2 kg (218 lb 11.2 oz).   Weight management plan: Discussed healthy diet and exercise guidelines- she swims 5 days a week        No follow-ups on file.    Edison Hale PA-C  [facility]    Rina Meyers is a 52 year old presenting for the following health issues:  Patient Request (Concern that BP might be high. She would like a stress test.)      History of Present Illness       Reason for visit:  Concern about stress / blood pressure    She eats 2-3 servings of fruits and vegetables daily.She consumes 0 sweetened beverage(s) daily.She exercises with enough effort to increase her heart rate 20 to 29 minutes per day.  She exercises with enough effort to increase her heart rate 5 days per week.   She is taking medications regularly.     Patient has a weird sensation in heart especially at time when feeling exhausted or stressed. Patient feels heart races but not always. She feels something is not right. Patient has a home wrist BP machine and she been checking for " "about 2 weeks at random times. She brought numbers in with her.  She also mentions she would like a stress test.      Chest Pain  Onset/Duration: palpitations  Description:   Location: left side  Character: not painful- palpitation/agitation  Radiation: none  Duration: intermittent - can last up to 30 minutes- happens on average 1-3 times a day   Intensity: mild  Progression of Symptoms: worsening- more stress at home and work  Accompanying Signs & Symptoms:  Shortness of breath: YES- feels constrained  Sweating: No  Nausea/vomiting: No  Lightheadedness: No  Palpitations: YES  Fever/Chills: No  Cough: No           Heartburn: No  History:   Family history of heart disease: YES- father has hypotension, sister has a fib, some maternal relatives with MI, heart disease  Tobacco use: No  Previous similar symptoms: no   Precipitating factors:   Worse with exertion: No  Worse with deep breaths: No           Related to eating: No           Better with burping: No  Alleviating factors: none  Therapies tried and outcome: none      Review of Systems   Constitutional, HEENT, cardiovascular, pulmonary, gi and gu systems are negative, except as otherwise noted.        Objective    /74 (BP Location: Right arm, Patient Position: Sitting, Cuff Size: Adult Large)   Pulse 87   Temp 98.5  F (36.9  C) (Oral)   Resp 14   Ht 1.676 m (5' 6\")   Wt 99.2 kg (218 lb 11.2 oz)   LMP 07/14/2020   SpO2 98%   BMI 35.30 kg/m    Body mass index is 35.3 kg/m .       Physical Exam   GENERAL: healthy, alert and no distress  EYES: Eyes grossly normal to inspection, PERRL and conjunctivae and sclerae normal  RESP: lungs clear to auscultation - no rales, rhonchi or wheezes  CV: regular rate and rhythm, normal S1 S2, no S3 or S4, no murmur, click or rub, no peripheral edema and peripheral pulses strong  MS: no gross musculoskeletal defects noted, no edema  SKIN: no suspicious lesions or rashes  NEURO: Normal strength and tone, mentation intact " and speech normal  PSYCH: mentation appears normal, affect normal/bright

## 2022-11-03 LAB
ANION GAP SERPL CALCULATED.3IONS-SCNC: 6 MMOL/L (ref 3–14)
BUN SERPL-MCNC: 14 MG/DL (ref 7–30)
CALCIUM SERPL-MCNC: 9.4 MG/DL (ref 8.5–10.1)
CHLORIDE BLD-SCNC: 107 MMOL/L (ref 94–109)
CHOLEST SERPL-MCNC: 227 MG/DL
CO2 SERPL-SCNC: 27 MMOL/L (ref 20–32)
CREAT SERPL-MCNC: 0.93 MG/DL (ref 0.52–1.04)
FASTING STATUS PATIENT QL REPORTED: NO
GFR SERPL CREATININE-BSD FRML MDRD: 74 ML/MIN/1.73M2
GLUCOSE BLD-MCNC: 107 MG/DL (ref 70–99)
HDLC SERPL-MCNC: 50 MG/DL
LDLC SERPL CALC-MCNC: 128 MG/DL
NONHDLC SERPL-MCNC: 177 MG/DL
POTASSIUM BLD-SCNC: 3.7 MMOL/L (ref 3.4–5.3)
SODIUM SERPL-SCNC: 140 MMOL/L (ref 133–144)
TRIGL SERPL-MCNC: 246 MG/DL

## 2022-11-16 ENCOUNTER — HOSPITAL ENCOUNTER (OUTPATIENT)
Dept: CARDIOLOGY | Facility: CLINIC | Age: 52
Discharge: HOME | End: 2022-11-16
Attending: PHYSICIAN ASSISTANT
Payer: COMMERCIAL

## 2022-11-16 DIAGNOSIS — R00.2 PALPITATIONS: ICD-10-CM

## 2022-11-16 PROCEDURE — 93248 EXT ECG>7D<15D REV&INTERPJ: CPT | Performed by: INTERNAL MEDICINE

## 2022-11-16 PROCEDURE — 93018 CV STRESS TEST I&R ONLY: CPT | Performed by: INTERNAL MEDICINE

## 2022-11-16 PROCEDURE — 93246 EXT ECG>7D<15D RECORDING: CPT

## 2022-11-16 PROCEDURE — 93017 CV STRESS TEST TRACING ONLY: CPT

## 2022-11-16 PROCEDURE — 93016 CV STRESS TEST SUPVJ ONLY: CPT | Performed by: INTERNAL MEDICINE

## 2023-01-08 ENCOUNTER — HEALTH MAINTENANCE LETTER (OUTPATIENT)
Age: 53
End: 2023-01-08

## 2023-07-24 ENCOUNTER — OFFICE VISIT (OUTPATIENT)
Dept: FAMILY MEDICINE | Facility: CLINIC | Age: 53
End: 2023-07-24
Payer: COMMERCIAL

## 2023-07-24 VITALS
OXYGEN SATURATION: 97 % | SYSTOLIC BLOOD PRESSURE: 112 MMHG | WEIGHT: 212 LBS | BODY MASS INDEX: 34.07 KG/M2 | HEART RATE: 75 BPM | DIASTOLIC BLOOD PRESSURE: 76 MMHG | HEIGHT: 66 IN | TEMPERATURE: 97.7 F | RESPIRATION RATE: 16 BRPM

## 2023-07-24 DIAGNOSIS — E66.811 CLASS 1 OBESITY WITH BODY MASS INDEX (BMI) OF 34.0 TO 34.9 IN ADULT, UNSPECIFIED OBESITY TYPE, UNSPECIFIED WHETHER SERIOUS COMORBIDITY PRESENT: ICD-10-CM

## 2023-07-24 DIAGNOSIS — Z86.19 HISTORY OF LYME DISEASE: ICD-10-CM

## 2023-07-24 DIAGNOSIS — R63.5 WEIGHT GAIN: ICD-10-CM

## 2023-07-24 DIAGNOSIS — E55.9 VITAMIN D DEFICIENCY: ICD-10-CM

## 2023-07-24 DIAGNOSIS — Z01.419 WELL WOMAN EXAM: Primary | ICD-10-CM

## 2023-07-24 DIAGNOSIS — R53.83 FATIGUE, UNSPECIFIED TYPE: ICD-10-CM

## 2023-07-24 DIAGNOSIS — E56.9 VITAMIN DEFICIENCY: ICD-10-CM

## 2023-07-24 DIAGNOSIS — E34.9 HORMONE DISTURBANCE: ICD-10-CM

## 2023-07-24 DIAGNOSIS — M62.89 MUSCLE TIGHTNESS: ICD-10-CM

## 2023-07-24 DIAGNOSIS — R00.2 PALPITATIONS: ICD-10-CM

## 2023-07-24 DIAGNOSIS — R73.01 IMPAIRED FASTING GLUCOSE: ICD-10-CM

## 2023-07-24 DIAGNOSIS — Z13.220 LIPID SCREENING: ICD-10-CM

## 2023-07-24 DIAGNOSIS — N95.1 MENOPAUSAL SYNDROME (HOT FLASHES): ICD-10-CM

## 2023-07-24 PROBLEM — R51.9 CHRONIC DAILY HEADACHE: Status: RESOLVED | Noted: 2018-04-23 | Resolved: 2023-07-24

## 2023-07-24 PROBLEM — R70.1 RETICULOCYTOSIS: Status: RESOLVED | Noted: 2019-07-22 | Resolved: 2023-07-24

## 2023-07-24 PROBLEM — G43.809 VESTIBULAR MIGRAINE: Status: RESOLVED | Noted: 2017-06-30 | Resolved: 2023-07-24

## 2023-07-24 PROBLEM — N18.2 CKD (CHRONIC KIDNEY DISEASE) STAGE 2, GFR 60-89 ML/MIN: Status: RESOLVED | Noted: 2019-07-22 | Resolved: 2023-07-24

## 2023-07-24 LAB
ALBUMIN SERPL BCG-MCNC: 4.6 G/DL (ref 3.5–5.2)
ALP SERPL-CCNC: 62 U/L (ref 35–104)
ALT SERPL W P-5'-P-CCNC: 34 U/L (ref 0–50)
ANION GAP SERPL CALCULATED.3IONS-SCNC: 12 MMOL/L (ref 7–15)
AST SERPL W P-5'-P-CCNC: 38 U/L (ref 0–45)
BILIRUB SERPL-MCNC: 1.7 MG/DL
BUN SERPL-MCNC: 13.3 MG/DL (ref 6–20)
CALCIUM SERPL-MCNC: 9.5 MG/DL (ref 8.6–10)
CHLORIDE SERPL-SCNC: 106 MMOL/L (ref 98–107)
CHOLEST SERPL-MCNC: 195 MG/DL
CREAT SERPL-MCNC: 1.06 MG/DL (ref 0.51–0.95)
CRP SERPL-MCNC: <3 MG/L
DEPRECATED CALCIDIOL+CALCIFEROL SERPL-MC: 142 UG/L (ref 20–75)
DEPRECATED HCO3 PLAS-SCNC: 23 MMOL/L (ref 22–29)
ERYTHROCYTE [DISTWIDTH] IN BLOOD BY AUTOMATED COUNT: 12.5 % (ref 10–15)
ERYTHROCYTE [SEDIMENTATION RATE] IN BLOOD BY WESTERGREN METHOD: 5 MM/HR (ref 0–30)
FERRITIN SERPL-MCNC: 146 NG/ML (ref 11–328)
FIBRINOGEN PPP-MCNC: 314 MG/DL (ref 170–490)
GFR SERPL CREATININE-BSD FRML MDRD: 63 ML/MIN/1.73M2
GGT SERPL-CCNC: 29 U/L (ref 5–36)
GLUCOSE SERPL-MCNC: 119 MG/DL (ref 70–99)
HBA1C MFR BLD: 5.3 % (ref 0–5.6)
HCT VFR BLD AUTO: 41 % (ref 35–47)
HDLC SERPL-MCNC: 42 MG/DL
HGB BLD-MCNC: 14.8 G/DL (ref 11.7–15.7)
IRON BINDING CAPACITY (ROCHE): 276 UG/DL (ref 240–430)
IRON SATN MFR SERPL: 45 % (ref 15–46)
IRON SERPL-MCNC: 124 UG/DL (ref 37–145)
LDLC SERPL CALC-MCNC: 118 MG/DL
LH SERPL-ACNC: 52.4 MIU/ML
MAGNESIUM SERPL-MCNC: 2.3 MG/DL (ref 1.7–2.3)
MCH RBC QN AUTO: 32.1 PG (ref 26.5–33)
MCHC RBC AUTO-ENTMCNC: 36.1 G/DL (ref 31.5–36.5)
MCV RBC AUTO: 89 FL (ref 78–100)
NONHDLC SERPL-MCNC: 153 MG/DL
PLATELET # BLD AUTO: 185 10E3/UL (ref 150–450)
POTASSIUM SERPL-SCNC: 4.1 MMOL/L (ref 3.4–5.3)
PROT SERPL-MCNC: 7.2 G/DL (ref 6.4–8.3)
RBC # BLD AUTO: 4.61 10E6/UL (ref 3.8–5.2)
RETICS # AUTO: 0.12 10E6/UL (ref 0.01–0.11)
RETICS/RBC NFR AUTO: 2.6 % (ref 0.8–2.7)
SODIUM SERPL-SCNC: 141 MMOL/L (ref 136–145)
T3FREE SERPL-MCNC: 3.4 PG/ML (ref 2–4.4)
T4 FREE SERPL-MCNC: 1.31 NG/DL (ref 0.9–1.7)
TRIGL SERPL-MCNC: 173 MG/DL
TSH SERPL DL<=0.005 MIU/L-ACNC: 1.81 UIU/ML (ref 0.3–4.2)
VIT B12 SERPL-MCNC: 1251 PG/ML (ref 232–1245)
WBC # BLD AUTO: 4.1 10E3/UL (ref 4–11)

## 2023-07-24 PROCEDURE — 83540 ASSAY OF IRON: CPT | Performed by: NURSE PRACTITIONER

## 2023-07-24 PROCEDURE — 85652 RBC SED RATE AUTOMATED: CPT | Performed by: NURSE PRACTITIONER

## 2023-07-24 PROCEDURE — 85384 FIBRINOGEN ACTIVITY: CPT | Performed by: NURSE PRACTITIONER

## 2023-07-24 PROCEDURE — 83036 HEMOGLOBIN GLYCOSYLATED A1C: CPT | Performed by: NURSE PRACTITIONER

## 2023-07-24 PROCEDURE — 84482 T3 REVERSE: CPT | Mod: 90 | Performed by: NURSE PRACTITIONER

## 2023-07-24 PROCEDURE — 84443 ASSAY THYROID STIM HORMONE: CPT | Performed by: NURSE PRACTITIONER

## 2023-07-24 PROCEDURE — 80053 COMPREHEN METABOLIC PANEL: CPT | Performed by: NURSE PRACTITIONER

## 2023-07-24 PROCEDURE — 99214 OFFICE O/P EST MOD 30 MIN: CPT | Mod: 25 | Performed by: NURSE PRACTITIONER

## 2023-07-24 PROCEDURE — 82977 ASSAY OF GGT: CPT | Performed by: NURSE PRACTITIONER

## 2023-07-24 PROCEDURE — 85027 COMPLETE CBC AUTOMATED: CPT | Performed by: NURSE PRACTITIONER

## 2023-07-24 PROCEDURE — 82306 VITAMIN D 25 HYDROXY: CPT | Performed by: NURSE PRACTITIONER

## 2023-07-24 PROCEDURE — 84439 ASSAY OF FREE THYROXINE: CPT | Performed by: NURSE PRACTITIONER

## 2023-07-24 PROCEDURE — 84481 FREE ASSAY (FT-3): CPT | Performed by: NURSE PRACTITIONER

## 2023-07-24 PROCEDURE — 36415 COLL VENOUS BLD VENIPUNCTURE: CPT | Performed by: NURSE PRACTITIONER

## 2023-07-24 PROCEDURE — 99396 PREV VISIT EST AGE 40-64: CPT | Performed by: NURSE PRACTITIONER

## 2023-07-24 PROCEDURE — 82607 VITAMIN B-12: CPT | Performed by: NURSE PRACTITIONER

## 2023-07-24 PROCEDURE — 83735 ASSAY OF MAGNESIUM: CPT | Performed by: NURSE PRACTITIONER

## 2023-07-24 PROCEDURE — 86140 C-REACTIVE PROTEIN: CPT | Performed by: NURSE PRACTITIONER

## 2023-07-24 PROCEDURE — 83550 IRON BINDING TEST: CPT | Performed by: NURSE PRACTITIONER

## 2023-07-24 PROCEDURE — 82728 ASSAY OF FERRITIN: CPT | Performed by: NURSE PRACTITIONER

## 2023-07-24 PROCEDURE — 99000 SPECIMEN HANDLING OFFICE-LAB: CPT | Performed by: NURSE PRACTITIONER

## 2023-07-24 PROCEDURE — 83002 ASSAY OF GONADOTROPIN (LH): CPT | Performed by: NURSE PRACTITIONER

## 2023-07-24 PROCEDURE — 80061 LIPID PANEL: CPT | Performed by: NURSE PRACTITIONER

## 2023-07-24 PROCEDURE — 85045 AUTOMATED RETICULOCYTE COUNT: CPT | Performed by: NURSE PRACTITIONER

## 2023-07-24 PROCEDURE — 86800 THYROGLOBULIN ANTIBODY: CPT | Performed by: NURSE PRACTITIONER

## 2023-07-24 RX ORDER — MULTIVITAMIN
1 TABLET ORAL EVERY MORNING
COMMUNITY
Start: 2022-09-09

## 2023-07-24 ASSESSMENT — ENCOUNTER SYMPTOMS
EYE PAIN: 0
MYALGIAS: 1
DIARRHEA: 0
DIZZINESS: 0
BREAST MASS: 0
HEARTBURN: 0
FREQUENCY: 0
COUGH: 0
CHILLS: 0
ARTHRALGIAS: 0
SORE THROAT: 0
WEAKNESS: 0
SHORTNESS OF BREATH: 0
DYSURIA: 0
NAUSEA: 0
NERVOUS/ANXIOUS: 0
HEADACHES: 0
PALPITATIONS: 0
PARESTHESIAS: 0
ABDOMINAL PAIN: 0
CONSTIPATION: 0
FEVER: 0
JOINT SWELLING: 0
HEMATURIA: 0
HEMATOCHEZIA: 0

## 2023-07-24 ASSESSMENT — PAIN SCALES - GENERAL: PAINLEVEL: NO PAIN (0)

## 2023-07-24 NOTE — PATIENT INSTRUCTIONS
"Menopause Supplements and Therapies:    Book- \"Hormone [place]\" by Dr. Margy Weaver    Herbs/supplements for menopause symptoms:     -Sibiric Rhubarb (active ingredient in Estrovera)-  1 tablet of Estrovera daily. Can help with hot flashes, mood swings, anxiety, brain fog, and insomnia.    -Radha (Lepidium Peruvianum)- 1 gram twice a day.  Adaptogenic plant- helps with dealing with biochemical, physical and emotional stress. Can help with hot flashes, insomnia, nervousness, depressions, heart palpitations.  Also associated with weight loss, decreased blood pressure and increase in HDL cholesterol.     -Black cohosh- 40-80 mg/day.  Can be helpful for hot flashes.     -Chaste berry (Vitex agnus castus)- 250-500 mg/day.  Can be helpful for irregular bleeding, hot flashes, body aches, sleep disturbances.          -Pine Bark (Pycnogenol)- 30 mg twice a day.  Can help with hot flashes, depression, memory, anxiety and sexual functioning.    -Flax seed (ground) 1-2 tablespoons twice a day.  Phytoestrogen, fiber.     -Mikal leaf- 500 mg twice a day.   Can help with excessive sweating, hot flashes, night sweats.     -Motherwort- 500 mg 2-3x/day.   Calming effect on the heart- ease palpitations, can help regulate menses, reduce anxiety.     -Shatavari- 500-1000 mg twice a day.  Can be helpful for moodiness, GI inflammation/IBS.     -Fish oil- 1,000-2,000 mg/day of a good quality fish oil (ProEPA by [employer] or [employer] Med[employer] are particularly good brands.)     -Inositol- 1-4 grams 2-3x/day.    Can be helpful for sleep, anxiety, depression.     Herbs for sleep:  -Hops- 300-600 mg 1-3x/day or larger dose at bedtime  -Lemon balm- 500-1000 mg 2-3x/day  -Valerian root- 2-3 grams @ bedtime.     Lifestyle:  Anti-inflammatory diet, regular exercise, weight management, stress reduction, adequate sleep.      Guided imagery:     For menopause:  [email or web address]   "   For sleep:  [email or web address]     Supplements:   [facility]  [address], [place] [address]  Store Phone Number: [phone]    [facility]: supplements, compounded hormones, saliva hormone testing  [address] [place] [address]  [phone] Phone   Preventive Health Recommendations  Female Ages 50 - 64    Yearly exam: See your health care provider every year in order to  Review health changes.   Discuss preventive care.    Review your medicines if your doctor has prescribed any.    Get a Pap test every three years (unless you have an abnormal result and your provider advises testing more often).  If you get Pap tests with HPV test, you only need to test every 5 years, unless you have an abnormal result.   You do not need a Pap test if your uterus was removed (hysterectomy) and you have not had cancer.  You should be tested each year for STDs (sexually transmitted diseases) if you're at risk.   Have a mammogram every 1 to 2 years.  Have a colonoscopy at age 50, or have a yearly FIT test (stool test). These exams screen for colon cancer.    Have a cholesterol test every 5 years, or more often if advised.  Have a diabetes test (fasting glucose) every three years. If you are at risk for diabetes, you should have this test more often.   If you are at risk for osteoporosis (brittle bone disease), think about having a bone density scan (DEXA).    Shots: Get a flu shot each year. Get a tetanus shot every 10 years.    Nutrition:   Eat at least 5 servings of fruits and vegetables each day.  Eat whole-grain bread, whole-wheat pasta and brown rice instead of white grains and rice.  Get adequate Calcium and Vitamin D.     Lifestyle  Exercise at least 150 minutes a week (30 minutes a day, 5 days a week). This will help you control your weight and prevent disease.  Limit alcohol to one drink per day.  No smoking.   Wear sunscreen  to prevent skin cancer.   See your dentist every six months for an exam and cleaning.  See your eye doctor every 1 to 2 years.

## 2023-07-24 NOTE — PROGRESS NOTES
SUBJECTIVE:   CC: Mira is an 53 year old who presents for preventive health visit.       7/24/2023     6:59 AM   Additional Questions   Roomed by Tamera CHUA CMA     Healthy Habits:     Getting at least 3 servings of Calcium per day:  Yes    Bi-annual eye exam:  Yes    Dental care twice a year:  Yes    Sleep apnea or symptoms of sleep apnea:  None    Diet:  Gluten-free/reduced and Other    Frequency of exercise:  6-7 days/week    Duration of exercise:  Greater than 60 minutes    Taking medications regularly:  Yes    Additional concerns today:  No  Medication Refill  Associated symptoms include myalgias. Pertinent negatives include no abdominal pain, arthralgias, chest pain, chills, congestion, coughing, fever, headaches, joint swelling, nausea, rash, sore throat or weakness.       Today's PHQ-2 Score:       7/24/2023     7:00 AM   PHQ-2 ( 1999 [employer])   Q1: Little interest or pleasure in doing things 0   Q2: Feeling down, depressed or hopeless 0   PHQ-2 Score 0   Q1: Little interest or pleasure in doing things Not at all   Q2: Feeling down, depressed or hopeless Not at all   PHQ-2 Score 0     Weight gain and fatigue despite healthy diet and regular exercise (training for triathlon currently).  Some fatigue.  Menopause symptoms controlled with compounded hormones.  Has extensive lab work done through functional medicine physician for years- would like to do that today as her previous physician is no longer available locally.  HX Lyme disease.  Chronic muscle tightness.        Social History     Tobacco Use    Smoking status: Never    Smokeless tobacco: Never   Substance Use Topics    Alcohol use: Not Currently             7/24/2023     7:00 AM   Alcohol Use   Prescreen: >3 drinks/day or >7 drinks/week? No     Reviewed orders with patient.  Reviewed health maintenance and updated orders accordingly - Yes  Lab work is in process    Breast Cancer Screening:   FHS-7:       11/2/2022     3:49 PM   Breast CA Risk  Assessment (FHS-7)   Did any of your first-degree relatives have breast or ovarian cancer? Yes   Did any of your relatives have bilateral breast cancer? No   Did any man in your family have breast cancer? No   Did any woman in your family have breast and ovarian cancer? No   Did any woman in your family have breast cancer before age 50 y? No   Do you have 2 or more relatives with breast and/or ovarian cancer? No   Do you have 2 or more relatives with breast and/or bowel cancer? No          Pertinent mammograms are reviewed under the imaging tab.    History of abnormal Pap smear: NO - age 30-65 PAP every 5 years with negative HPV co-testing recommended      Latest Ref Rng & Units 6/28/2021     9:45 AM 6/28/2021     9:40 AM 10/25/2016    11:27 AM   PAP / HPV   PAP (Historical)   NIL     HPV 16 DNA NEG^Negative Negative   Negative    HPV 18 DNA NEG^Negative Negative   Negative    Other HR HPV NEG^Negative Negative   Negative      Reviewed and updated as needed this visit by clinical staff   Tobacco  Allergies  Meds  Problems  Med Hx  Surg Hx  Fam Hx          Reviewed and updated as needed this visit by Provider   Tobacco  Allergies  Meds  Problems  Med Hx  Surg Hx  Fam Hx         Past Medical History:   Diagnosis Date    AGW (anogenital warts)     Arthritis of first metatarsophalangeal (MTP) joint of left foot 7/30/2021    ASCUS (atypical squamous cells of undetermined significance) on Pap smear 11/11/13    neg HPV. plan to repeat pap/HPV in 1 year    CKD (chronic kidney disease)     Headache     Heart palpitations 10/1998    LSIL (low grade squamous intraepithelial lesion) on Pap smear 10/12/11, 6/2012 11/11/11 colp - LSIL    Lyme disease     Migraines Age 20's    Cluster    Obesity     Obesity     PONV (postoperative nausea and vomiting)     Reticulocytosis       Past Surgical History:   Procedure Laterality Date    CHEILECTOMY Left 10/19/2021    Procedure: LEFT FOOT CHEILECTOMY;  Surgeon: Steven  "Arturo Silva DPM;  Location: [facility] OR    COLONOSCOPY N/A 8/19/2021    Procedure: COLONOSCOPY;  Surgeon: Corwin Remy MD;  Location:  GI    EXCISE VULVA WIDE LOCAL  1/16/2013    Procedure: EXCISE VULVA WIDE LOCAL;  Vaginal wide local excision of the vulva.   Diagnosis: vulva chondyloma.;  Surgeon: Pretty Collado MD;  Location: [facility] OR    EXCISE VULVA WIDE LOCAL      SURGICAL HISTORY OF -   2/2008    Fibula Fx       Review of Systems   Constitutional:  Negative for chills and fever.   HENT:  Negative for congestion, ear pain, hearing loss and sore throat.    Eyes:  Negative for pain and visual disturbance.   Respiratory:  Negative for cough and shortness of breath.    Cardiovascular:  Negative for chest pain, palpitations and peripheral edema.   Gastrointestinal:  Negative for abdominal pain, constipation, diarrhea, heartburn, hematochezia and nausea.   Breasts:  Negative for tenderness, breast mass and discharge.   Genitourinary:  Negative for dysuria, frequency, genital sores, hematuria, pelvic pain, urgency, vaginal bleeding and vaginal discharge.   Musculoskeletal:  Positive for myalgias. Negative for arthralgias and joint swelling.   Skin:  Negative for rash.   Neurological:  Negative for dizziness, weakness, headaches and paresthesias.   Psychiatric/Behavioral:  Negative for mood changes. The patient is not nervous/anxious.           OBJECTIVE:   /76   Pulse 75   Temp 97.7  F (36.5  C) (Temporal)   Resp 16   Ht 1.676 m (5' 6\")   Wt 96.2 kg (212 lb)   LMP 07/14/2020   SpO2 97%   Breastfeeding No   BMI 34.22 kg/m    Physical Exam  GENERAL APPEARANCE: healthy, alert and no distress  EYES: Eyes grossly normal to inspection, PERRL and conjunctivae and sclerae normal  HENT: ear canals and TM's normal, nose and mouth without ulcers or lesions, oropharynx clear and oral mucous membranes moist  NECK: no adenopathy, no asymmetry, masses, or scars and thyroid normal to palpation  RESP: lungs clear " to auscultation - no rales, rhonchi or wheezes  BREAST: normal without masses, tenderness or nipple discharge and no palpable axillary masses or adenopathy  CV: regular rate and rhythm, normal S1 S2, no S3 or S4, no murmur, click or rub, no peripheral edema and peripheral pulses strong  ABDOMEN: soft, nontender, no hepatosplenomegaly, no masses and bowel sounds normal   (female): normal female external genitalia, normal urethral meatus, vaginal mucosal atrophy noted, normal cervix, adnexae, and uterus without masses or abnormal discharge  MS: no musculoskeletal defects are noted and gait is age appropriate without ataxia  SKIN: no suspicious lesions or rashes  NEURO: Normal strength and tone, sensory exam grossly normal, mentation intact and speech normal  PSYCH: mentation appears normal and affect normal/bright    Diagnostic Test Results:  Labs reviewed in Epic    ASSESSMENT/PLAN:   (Z01.419) Well woman exam  (primary encounter diagnosis)  Comment: Overall good health  Plan: Continue healthy habits    (N95.1) Menopausal syndrome (hot flashes)  Comment: Controlled  Plan: COMPOUNDED NON-CONTROLLED SUBSTANCE (CMPD RX) -        PHARMACY TO MIX COMPOUNDED MEDICATION,         COMPOUNDED NON-CONTROLLED SUBSTANCE (CMPD RX) -        PHARMACY TO MIX COMPOUNDED MEDICATION            (R73.01) Impaired fasting glucose  Comment:   Plan: Low carb diet    (R00.2) Palpitations  Comment: Stable  Plan:     (E55.9) Vitamin D deficiency  Comment:   Plan: Vitamin D Deficiency            (R63.5) Weight gain  Comment: Exercising 5+ x/week  Plan: Comprehensive metabolic panel (BMP + Alb, Alk         Phos, ALT, AST, Total. Bili, TP)        Intermittent fasting discussed.    (R53.83) Fatigue, unspecified type  Comment:   Plan: CBC with platelets, CRP, inflammation            (M62.89) Muscle tightness  Comment:   Plan: Magnesium, CRP, inflammation            (E66.9,  Z68.34) Class 1 obesity with body mass index (BMI) of 34.0 to 34.9 in  "adult, unspecified obesity type, unspecified whether serious comorbidity present  Comment:   Plan: Hemoglobin A1c, CRP, inflammation, TSH, T4,         free, T3, Free            (E56.9) Vitamin deficiency  Comment:   Plan: Vitamin B12            (Z13.220) Lipid screening  Comment:   Plan: Lipid panel reflex to direct LDL Fasting            (Z86.19) History of Lyme disease  Comment:   Plan: Fibrinogen activity, GGT, Anti thyroglobulin         antibody, Reticulocyte count, Iron and iron         binding capacity, Ferritin, ESR: Erythrocyte         sedimentation rate            (E34.9) Hormone disturbance  Comment:   Plan: Luteinizing Hormone, T3 reverse, Lutropin         ([facility])            Patient has been advised of split billing requirements and indicates understanding: Yes      COUNSELING:  Reviewed preventive health counseling, as reflected in patient instructions       Healthy diet/nutrition      BMI:   Estimated body mass index is 34.22 kg/m  as calculated from the following:    Height as of this encounter: 1.676 m (5' 6\").    Weight as of this encounter: 96.2 kg (212 lb).   Weight management plan: Discussed healthy diet and exercise guidelines      She reports that she has never smoked. She has never used smokeless tobacco.          Tia Ramírez, CNP  [facility]  "

## 2023-07-25 LAB — THYROGLOB AB SERPL IA-ACNC: <20 IU/ML

## 2023-07-29 LAB — T3REVERSE SERPL-MCNC: 10.8 NG/DL

## 2023-09-26 ENCOUNTER — ANCILLARY PROCEDURE (OUTPATIENT)
Dept: MAMMOGRAPHY | Facility: CLINIC | Age: 53
End: 2023-09-26
Attending: NURSE PRACTITIONER
Payer: COMMERCIAL

## 2023-09-26 DIAGNOSIS — Z12.31 VISIT FOR SCREENING MAMMOGRAM: ICD-10-CM

## 2023-09-26 PROCEDURE — 77063 BREAST TOMOSYNTHESIS BI: CPT | Mod: TC | Performed by: RADIOLOGY

## 2023-09-26 PROCEDURE — 77067 SCR MAMMO BI INCL CAD: CPT | Mod: TC | Performed by: RADIOLOGY

## 2023-10-26 ENCOUNTER — E-VISIT (OUTPATIENT)
Dept: FAMILY MEDICINE | Facility: CLINIC | Age: 53
End: 2023-10-26
Payer: COMMERCIAL

## 2023-10-26 DIAGNOSIS — N39.0 ACUTE UTI (URINARY TRACT INFECTION): Primary | ICD-10-CM

## 2023-10-26 PROCEDURE — 99421 OL DIG E/M SVC 5-10 MIN: CPT | Performed by: FAMILY MEDICINE

## 2023-10-27 RX ORDER — NITROFURANTOIN 25; 75 MG/1; MG/1
100 CAPSULE ORAL 2 TIMES DAILY
Qty: 10 CAPSULE | Refills: 0 | Status: SHIPPED | OUTPATIENT
Start: 2023-10-27 | End: 2023-11-01

## 2023-10-27 NOTE — PATIENT INSTRUCTIONS
Dear Mira Luis    After reviewing your responses, I've been able to diagnose you with a urinary tract infection, which is a common infection of the bladder with bacteria.  This is not a sexually transmitted infection, though urinating immediately after intercourse can help prevent infections.  Drinking lots of fluids is also helpful to clear your current infection and prevent the next one.      I have sent a prescription for antibiotics to your pharmacy to treat this infection.    It is important that you take all of your prescribed medication even if your symptoms are improving after a few doses.  Taking all of your medicine helps prevent the symptoms from returning.     If your symptoms worsen, you develop pain in your back or stomach, develop fevers, or are not improving in 5 days, please contact your primary care provider for an appointment or visit any of our convenient Walk-in or Urgent Care Centers to be seen, which can be found on our website here.    Thanks again for choosing us as your health care partner,    Maria Elena Gonzalez MD

## 2023-10-27 NOTE — TELEPHONE ENCOUNTER
Provider E-Visit time total (minutes): 3      Assessment:   The primary encounter diagnosis was (N39.0) Acute UTI (urinary tract infection)  (primary encounter diagnosis)  Comment:   Plan: nitroFURantoin macrocrystal-monohydrate         (MACROBID) 100 MG capsule              Plan:   No orders of the defined types were placed in this encounter.      Patient Instructions   Dear Mira Luis    After reviewing your responses, I've been able to diagnose you with a urinary tract infection, which is a common infection of the bladder with bacteria.  This is not a sexually transmitted infection, though urinating immediately after intercourse can help prevent infections.  Drinking lots of fluids is also helpful to clear your current infection and prevent the next one.      I have sent a prescription for antibiotics to your pharmacy to treat this infection.    It is important that you take all of your prescribed medication even if your symptoms are improving after a few doses.  Taking all of your medicine helps prevent the symptoms from returning.     If your symptoms worsen, you develop pain in your back or stomach, develop fevers, or are not improving in 5 days, please contact your primary care provider for an appointment or visit any of our convenient Walk-in or Urgent Care Centers to be seen, which can be found on our website here.    Thanks again for choosing us as your health care partner,    Maria Elena Gonzalez MD      Subjective:   Mira Luis is a 53 year old female who submitted an eVisit request for evaluation of   Chief Complaint   Patient presents with    UTI     Entered automatically based on patient selection in [facility].       See the questionnaire and message section of encounter report for information related to history of present illness and review of systems.    Portions of the patient's history were reviewed and updated as appropriate.     Objective:   No exam performed today, patient submitted as  Joelit.

## 2024-05-10 ENCOUNTER — OFFICE VISIT (OUTPATIENT)
Dept: OBGYN | Facility: CLINIC | Age: 54
End: 2024-05-10
Payer: COMMERCIAL

## 2024-05-10 ENCOUNTER — NURSE TRIAGE (OUTPATIENT)
Dept: NURSING | Facility: CLINIC | Age: 54
End: 2024-05-10
Payer: COMMERCIAL

## 2024-05-10 VITALS
HEART RATE: 68 BPM | DIASTOLIC BLOOD PRESSURE: 84 MMHG | SYSTOLIC BLOOD PRESSURE: 119 MMHG | TEMPERATURE: 97.7 F | WEIGHT: 217.1 LBS | HEIGHT: 66 IN | BODY MASS INDEX: 34.89 KG/M2

## 2024-05-10 DIAGNOSIS — N95.0 POSTMENOPAUSAL BLEEDING: Primary | ICD-10-CM

## 2024-05-10 PROCEDURE — 99203 OFFICE O/P NEW LOW 30 MIN: CPT | Performed by: OBSTETRICS & GYNECOLOGY

## 2024-05-10 NOTE — TELEPHONE ENCOUNTER
Telephone Call for appt:    Pt callng to make an appt for her provider for sx she was having at the end of April that are no longer present.     Pt is 3-4 years post menopausal   She had some Bbleeding spotting over the course of 2.5 days  End of April 26-28th; almost like having a mini-period    Hx of Fibroids    Pt was advised to call back if her sx return and she was transferred to scheduling to make an appt per her request.     Reason for Disposition   [1] Menopause symptoms (e.g., hot flashes, irregular periods, vaginal dryness) AND [2] interfere with normal activities like work or sleep) AND [3] no improvement after using Care Advice    Protocols used: Menopause Symptoms and [id number] Cooley RN  [facility] Nurse Advisor 11:02 AM 5/10/2024

## 2024-05-10 NOTE — PROGRESS NOTES
S; Mira Luis is a 53 year old  who presents with postmenopausal bleeding,  She reports she has been menopausal for 3-4 years.  She has been using compounded HRT since menopause and has done well.  Last week she had 2 days of light period like bleeding.  Since menopause she has had no other bleeding.  She denies additional symptoms or pain, bloating.  She has a h/o fibroids, concerned they may have caused the bleeding.    Past Medical History:   Diagnosis Date    AGW (anogenital warts)     Arthritis of first metatarsophalangeal (MTP) joint of left foot 2021    ASCUS (atypical squamous cells of undetermined significance) on Pap smear 13    neg HPV. plan to repeat pap/HPV in 1 year    CKD (chronic kidney disease)     Headache     Heart palpitations 10/1998    LSIL (low grade squamous intraepithelial lesion) on Pap smear 10/12/11, 11 colp - LSIL    Lyme disease     Migraines Age 20's    Cluster    Obesity     Obesity     PONV (postoperative nausea and vomiting)     Reticulocytosis      Past Surgical History:   Procedure Laterality Date    CHEILECTOMY Left 10/19/2021    Procedure: LEFT FOOT CHEILECTOMY;  Surgeon: Arturo Harris DPM;  Location: [facility] OR    COLONOSCOPY N/A 2021    Procedure: COLONOSCOPY;  Surgeon: Corwin Remy MD;  Location:  GI    EXCISE VULVA WIDE LOCAL  2013    Procedure: EXCISE VULVA WIDE LOCAL;  Vaginal wide local excision of the vulva.   Diagnosis: vulva chondyloma.;  Surgeon: Pretty Collado MD;  Location:  OR    EXCISE VULVA WIDE LOCAL      SURGICAL HISTORY OF -   2008    Fibula Fx     OB History    Para Term  AB Living   0 0 0 0 0 0   SAB IAB Ectopic Multiple Live Births   0 0 0 0 0        Allergies   Allergen Reactions    Shellfish Allergy      Throat closed    Sulfa Antibiotics        Current Outpatient Medications:     BIESTROGEN, 20-80, 1.25 MG COMPOUND, Apply 2 mg topically daily 50/50 2MG/GRAM APPLY 2 PUMPS  TOPICALLY ONCE A DAY, Disp: , Rfl:     MAGNESIUM GLYCINATE PO, Take by mouth daily, Disp: , Rfl:     Multiple Vitamin (ONE-A-DAY ESSENTIAL) TABS, Take 1 tablet by mouth daily, Disp: , Rfl:     progesterone (PROMETRIUM) 100 MG capsule, Take 150 mg by mouth At Bedtime, Disp: , Rfl:     UNABLE TO FIND, GIANNI-e 400 mg/day, Disp: , Rfl:     COMPOUNDED NON-CONTROLLED SUBSTANCE (CMPD RX) - PHARMACY TO MIX COMPOUNDED MEDICATION, BI-EST 50:50 2mg/g- apply 2 clicks once a day, Disp: 90 g, Rfl: 3    COMPOUNDED NON-CONTROLLED SUBSTANCE (CMPD RX) - PHARMACY TO MIX COMPOUNDED MEDICATION, Progesterone DR  150 mg capsule- take one at HS, Disp: 90 capsule, Rfl: 3    Social History     Socioeconomic History    Marital status:      Spouse name: Not on file    Number of children: Not on file    Years of education: Not on file    Highest education level: Bachelor's degree (e.g., BA, AB, BS)   Occupational History    Not on file   Tobacco Use    Smoking status: Never    Smokeless tobacco: Never   Vaping Use    Vaping status: Never Used   Substance and Sexual Activity    Alcohol use: Not Currently    Drug use: No    Sexual activity: Yes     Partners: Male     Comment: [facility]   Other Topics Concern    Parent/sibling w/ CABG, MI or angioplasty before 65F 55M? Not Asked     Service No    Blood Transfusions No    Caffeine Concern No    Occupational Exposure No    Hobby Hazards No    Sleep Concern No    Stress Concern No    Weight Concern No    Special Diet No    Back Care No    Exercise Yes    Bike Helmet No    Seat Belt Yes    Self-Exams Yes   Social History Narrative    Not on file     Social Determinants of Health     Financial Resource Strain: High Risk (1/1/2022)    [facility]    Financial Resource Strain     Difficulty of Paying Living Expenses: Not on file     Difficulty of Paying Living Expenses: Not on file   Food Insecurity:  "Not on file   Transportation Needs: Not on file   Physical Activity: Insufficiently Active (7/22/2019)    Exercise Vital Sign     Days of Exercise per Week: 1 day     Minutes of Exercise per Session: 20 min   Stress: No Stress Concern Present (7/22/2019)    [employer] [place] [employer] - Occupational Stress Questionnaire     Feeling of Stress : Not at all   Social Connections: Unknown (1/1/2022)    Received from [employer] [employer]    Social Connections     Frequency of Communication with Friends and Family: Not on file   Interpersonal Safety: Not on file   Housing Stability: Not on file     Family History   Problem Relation Age of Onset    Ulcerative Colitis Mother     Arrhythmia Mother         s/p ablation    Hypotension Father     Hypertension Maternal Grandmother     Cancer Maternal Aunt     Cancer Paternal Aunt     Diabetes No family hx of     Cerebrovascular Disease No family hx of     Thyroid Disease No family hx of     Glaucoma No family hx of     Macular Degeneration No family hx of     Colon Cancer No family hx of      Past medical, surgical, social and family history were reviewed and [facility] in [facility].    PE: /84   Pulse 68   Temp 97.7  F (36.5  C)   Ht 1.676 m (5' 6\")   Wt 98.5 kg (217 lb 1.6 oz)   LMP 07/14/2020   BMI 35.04 kg/m      Gen: NAD    A/P; postmenopausal bleeding   We discussed her bleeding history as reviewed the common causes for postmenopausal bleeding, including risk of endometrial hyperplasia and cancer.  I explained that known side effect of HRT is bleeding, but that doesn't exclude risk of hyperplasia, etc. I explained that fibroids unlikely cause of bleeding.  We discussed options of office EMB now vs ultrasound to check EMS.  I explained if lining were >4mm, she would then need EMB.  She would like to see what fibroids are doing now too, so prefers to start with pelvic us and go from there.  " Questions answered    JACKLYN MORA MD

## 2024-06-28 ENCOUNTER — TELEPHONE (OUTPATIENT)
Dept: FAMILY MEDICINE | Facility: CLINIC | Age: 54
End: 2024-06-28

## 2024-06-28 ENCOUNTER — LAB (OUTPATIENT)
Dept: LAB | Facility: CLINIC | Age: 54
End: 2024-06-28
Payer: COMMERCIAL

## 2024-06-28 DIAGNOSIS — E34.9 HORMONE DISTURBANCE: Primary | ICD-10-CM

## 2024-06-28 NOTE — TELEPHONE ENCOUNTER
Attempted to call patient to speak with her about her labs orders she wanted added on. Unable to leave voicemail due to full mailbox. If patient calls back, please route to [facility]. TC's, please inform patient she will need to speak with her PCP to get these lab orders added. If she had any questions about this, please route to RNs.    Kizzy Nichole RN  [facility]

## 2024-06-28 NOTE — TELEPHONE ENCOUNTER
General Call    Contacts       Contact Date/Time Type Contact Phone/Fax    06/28/2024 09:11 AM CDT Phone (Incoming) Mira Luis RENEE (Self) [phone] (M)          Reason for Call:  Lab draws     What are your questions or concerns:  Patient is calling and stating she is at the [facility] on 06/28/2024 and asking if she can get a order in for a repeat lab draw the same as last time on 7/024/2023 done again. Please advise and call patient with updates please and thank you.    Date of last appointment with provider: 06/28/2024    Could we send this information to you in [employer] or would you prefer to receive a phone call?:   Patient would prefer a phone call   Okay to leave a detailed message?: Yes at Cell number on file:    Telephone Information:   Mobile [phone]

## 2024-06-28 NOTE — TELEPHONE ENCOUNTER
Spoke with patient to further clarify the need for her to set up an appointment for these labs as she hasn't seen PCP in almost a year. She verbalized understanding and set up a virtual appointment for this.     iKzzy Nichole RN  [facility]

## 2024-06-28 NOTE — TELEPHONE ENCOUNTER
Pt called back, relayed information below and she understood.    Please call pt once orders are in from [facility].

## 2024-07-01 ENCOUNTER — ANCILLARY PROCEDURE (OUTPATIENT)
Dept: ULTRASOUND IMAGING | Facility: CLINIC | Age: 54
End: 2024-07-01
Attending: OBSTETRICS & GYNECOLOGY
Payer: COMMERCIAL

## 2024-07-01 DIAGNOSIS — N95.0 POSTMENOPAUSAL BLEEDING: ICD-10-CM

## 2024-07-01 PROCEDURE — 76830 TRANSVAGINAL US NON-OB: CPT

## 2024-07-01 PROCEDURE — 76856 US EXAM PELVIC COMPLETE: CPT

## 2024-07-02 ENCOUNTER — TELEPHONE (OUTPATIENT)
Dept: OBGYN | Facility: CLINIC | Age: 54
End: 2024-07-02
Payer: COMMERCIAL

## 2024-07-02 NOTE — TELEPHONE ENCOUNTER
Maria Elena Saavedra MD  [address] [facility]  Please contact Mira Luis and schedule her for a 30min visit for endometrial biopsy.  thanks        ----> Called and unable to leave voicemail for patient as her mailbox is full. MYC sent.

## 2024-07-10 ENCOUNTER — VIRTUAL VISIT (OUTPATIENT)
Dept: FAMILY MEDICINE | Facility: CLINIC | Age: 54
End: 2024-07-10
Payer: COMMERCIAL

## 2024-07-10 DIAGNOSIS — R73.01 IMPAIRED FASTING GLUCOSE: ICD-10-CM

## 2024-07-10 DIAGNOSIS — E66.811 CLASS 1 OBESITY WITH BODY MASS INDEX (BMI) OF 34.0 TO 34.9 IN ADULT, UNSPECIFIED OBESITY TYPE, UNSPECIFIED WHETHER SERIOUS COMORBIDITY PRESENT: ICD-10-CM

## 2024-07-10 DIAGNOSIS — R53.83 FATIGUE, UNSPECIFIED TYPE: ICD-10-CM

## 2024-07-10 DIAGNOSIS — Z86.19 HISTORY OF LYME DISEASE: ICD-10-CM

## 2024-07-10 DIAGNOSIS — N95.1 MENOPAUSAL SYNDROME (HOT FLASHES): Primary | ICD-10-CM

## 2024-07-10 DIAGNOSIS — Z13.220 LIPID SCREENING: ICD-10-CM

## 2024-07-10 DIAGNOSIS — E56.9 VITAMIN DEFICIENCY: ICD-10-CM

## 2024-07-10 DIAGNOSIS — R17 ELEVATED BILIRUBIN: ICD-10-CM

## 2024-07-10 PROCEDURE — 99213 OFFICE O/P EST LOW 20 MIN: CPT | Mod: 95 | Performed by: NURSE PRACTITIONER

## 2024-07-10 NOTE — PROGRESS NOTES
Mira is a 54 year old who is being evaluated via a billable video visit.    How would you like to obtain your AVS? MyChart  If the video visit is dropped, the invitation should be resent by: Text to cell phone: [phone]  Will anyone else be joining your video visit? No        Subjective   Mira is a 54 year old, presenting for the following health issues:  RECHECK (Patient requesting blood work, no symptoms, chronic lymes, hormone level, Gyn visit, irregular bleeding.)        7/24/2023     6:59 AM   Additional Questions   Roomed by Tamera CHUA CMA     HPI   Would like yearly blood work done for follow-up of hormonal issues and Lyme's disease.  Overall feeling well.       Review of Systems  Constitutional, HEENT, cardiovascular, pulmonary, gi and gu systems are negative, except as otherwise noted.      Objective    Vitals - Patient Reported  Weight (Patient Reported): 90.7 kg (200 lb)  Pain Score: No Pain (0)        Physical Exam   GENERAL: alert and no distress  EYES: Eyes grossly normal to inspection.  No discharge or erythema, or obvious scleral/conjunctival abnormalities.  RESP: No audible wheeze, cough, or visible cyanosis.    SKIN: Visible skin clear. No significant rash, abnormal pigmentation or lesions.  NEURO: Cranial nerves grossly intact.  Mentation and speech appropriate for age.  PSYCH: Appropriate affect, tone, and pace of words    Office Visit on 07/24/2023   Component Date Value Ref Range Status    Sodium 07/24/2023 141  136 - 145 mmol/L Final    Potassium 07/24/2023 4.1  3.4 - 5.3 mmol/L Final    Chloride 07/24/2023 106  98 - 107 mmol/L Final    Carbon Dioxide (CO2) 07/24/2023 23  22 - 29 mmol/L Final    Anion Gap 07/24/2023 12  7 - 15 mmol/L Final    Urea Nitrogen 07/24/2023 13.3  6.0 - 20.0 mg/dL Final    Creatinine 07/24/2023 1.06 (H)  0.51 - 0.95 mg/dL Final    Calcium 07/24/2023 9.5  8.6 - 10.0 mg/dL Final    Glucose 07/24/2023 119 (H)  70 - 99 mg/dL Final    Alkaline Phosphatase 07/24/2023 62   35 - 104 U/L Final    AST 07/24/2023 38  0 - 45 U/L Final    Reference intervals for this test were updated on 6/12/2023 to more accurately reflect our healthy population. There may be differences in the flagging of prior results with similar values performed with this method. Interpretation of those prior results can be made in the context of the updated reference intervals.    ALT 07/24/2023 34  0 - 50 U/L Final    Reference intervals for this test were updated on 6/12/2023 to more accurately reflect our healthy population. There may be differences in the flagging of prior results with similar values performed with this method. Interpretation of those prior results can be made in the context of the updated reference intervals.      Protein Total 07/24/2023 7.2  6.4 - 8.3 g/dL Final    Albumin 07/24/2023 4.6  3.5 - 5.2 g/dL Final    Bilirubin Total 07/24/2023 1.7 (H)  <=1.2 mg/dL Final    GFR Estimate 07/24/2023 63  >60 mL/min/1.73m2 Final    WBC Count 07/24/2023 4.1  4.0 - 11.0 10e3/uL Final    RBC Count 07/24/2023 4.61  3.80 - 5.20 10e6/uL Final    Hemoglobin 07/24/2023 14.8  11.7 - 15.7 g/dL Final    Hematocrit 07/24/2023 41.0  35.0 - 47.0 % Final    MCV 07/24/2023 89  78 - 100 fL Final    MCH 07/24/2023 32.1  26.5 - 33.0 pg Final    MCHC 07/24/2023 36.1  31.5 - 36.5 g/dL Final    RDW 07/24/2023 12.5  10.0 - 15.0 % Final    Platelet Count 07/24/2023 185  150 - 450 10e3/uL Final    Vitamin D, Total (25-Hydroxy) 07/24/2023 142 (H)  20 - 75 ug/L Final    Magnesium 07/24/2023 2.3  1.7 - 2.3 mg/dL Final    Hemoglobin A1C 07/24/2023 5.3  0.0 - 5.6 % Final    Normal <5.7%   Prediabetes 5.7-6.4%    Diabetes 6.5% or higher     Note: Adopted from ADA consensus guidelines.    CRP Inflammation 07/24/2023 <3.00  <5.00 mg/L Final    TSH 07/24/2023 1.81  0.30 - 4.20 uIU/mL Final    Free T4 07/24/2023 1.31  0.90 - 1.70 ng/dL Final    T3 Free 07/24/2023 3.4  2.0 - 4.4 pg/mL Final    Vitamin B12 07/24/2023 1,251 (H)  232 - 1,245  pg/mL Final    Cholesterol 07/24/2023 195  <200 mg/dL Final    Triglycerides 07/24/2023 173 (H)  <150 mg/dL Final    Direct Measure HDL 07/24/2023 42 (L)  >=50 mg/dL Final    LDL Cholesterol Calculated 07/24/2023 118 (H)  <=100 mg/dL Final    Non HDL Cholesterol 07/24/2023 153 (H)  <130 mg/dL Final    Fibrinogen Activity 07/24/2023 314  170 - 490 mg/dL Final    GGT 07/24/2023 29  5 - 36 U/L Final    Luteinizing Hormone 07/24/2023 52.4  mIU/mL Final    FEMALE:  Age  0 - 6 mo:  <0.1-8.2 mIU/mL  6 mo - 11 years: <0.1-1.3 mIU/mL   11 - 14 years: <0.1-10 mIU/mL   14 - 19 years: 0.4-25 mIU/mL     19 years and older:   Follicular Phase: 2.4-12.6 mIU/mL  Ovulation Phase: 14.0-95.6 mIU/mL  Luteal Phase: 1.0-11.4  mIU/mL  Postmenopausal: 7.7-58.5 mIU/mL      T3, Reverse ng/dL 07/24/2023 10.8  9.0 - 27.0 ng/dL Final    INTERPRETIVE INFORMATION: Triiodothyronine, Reverse -   LC-MS/MS    This test was developed and its performance characteristics   determined by [facility]. It has not been cleared or   approved by the US Food and Drug Administration. This test   was performed in a CLIA certified laboratory and is   intended for clinical purposes.  Performed By: [facility]  [address] [place] [id number]  : Robinson Obrien MD, PhD    Thyroglobulin Antibody 07/24/2023 <20  <40 IU/mL Final    % Reticulocyte 07/24/2023 2.6  0.8 - 2.7 % Final    Absolute Reticulocyte 07/24/2023 0.121 (H)  0.010 - 0.110 10e6/uL Final    Iron 07/24/2023 124  37 - 145 ug/dL Final    Iron Binding Capacity 07/24/2023 276  240 - 430 ug/dL Final    Iron Sat Index 07/24/2023 45  15 - 46 % Final    Ferritin 07/24/2023 146  11 - 328 ng/mL Final    Erythrocyte Sedimentation Rate 07/24/2023 5  0 - 30 mm/hr Final       A/P:  1. Menopausal syndrome (hot flashes)  - Luteinizing Hormone; Future    2. Impaired fasting glucose  - Comprehensive metabolic panel (BMP + Alb, Alk Phos, ALT, AST, Total. Bili, TP); Future  -  Hemoglobin A1c; Future    3. Elevated bilirubin    4. Fatigue, unspecified type  - Comprehensive metabolic panel (BMP + Alb, Alk Phos, ALT, AST, Total. Bili, TP); Future  - CBC with platelets; Future  - Vitamin D Deficiency; Future  - Hemoglobin A1c; Future  - TSH; Future  - T4, free; Future  - T3, Free; Future  - T3 reverse; Future    5. Vitamin deficiency  - Vitamin D Deficiency; Future  - Magnesium; Future  - Vitamin B12; Future  - Ferritin; Future    6. History of Lyme disease  - CRP, inflammation; Future  - Fibrinogen activity; Future  - GGT; Future  - Anti thyroglobulin antibody; Future  - Reticulocyte count; Future  - ESR: Erythrocyte sedimentation rate; Future    7. Lipid screening  - Lipid panel reflex to direct LDL Fasting; Future    8. Class 1 obesity with body mass index (BMI) of 34.0 to 34.9 in adult, unspecified obesity type, unspecified whether serious comorbidity present  - Comprehensive metabolic panel (BMP + Alb, Alk Phos, ALT, AST, Total. Bili, TP); Future  - TSH; Future  - T4, free; Future  - T3, Free; Future  - T3 reverse; Future    Video-Visit Details    Type of service:  Video Visit   Originating Location (pt. Location): Home    Distant Location (provider location):  On-site  Platform used for Video Visit: Eric  Signed Electronically by: Tia Ramírez CNP

## 2024-07-11 ENCOUNTER — LAB (OUTPATIENT)
Dept: LAB | Facility: CLINIC | Age: 54
End: 2024-07-11
Payer: COMMERCIAL

## 2024-07-11 DIAGNOSIS — Z13.220 LIPID SCREENING: ICD-10-CM

## 2024-07-11 DIAGNOSIS — N95.1 MENOPAUSAL SYNDROME (HOT FLASHES): ICD-10-CM

## 2024-07-11 DIAGNOSIS — E66.811 CLASS 1 OBESITY WITH BODY MASS INDEX (BMI) OF 34.0 TO 34.9 IN ADULT, UNSPECIFIED OBESITY TYPE, UNSPECIFIED WHETHER SERIOUS COMORBIDITY PRESENT: ICD-10-CM

## 2024-07-11 DIAGNOSIS — R53.83 FATIGUE, UNSPECIFIED TYPE: ICD-10-CM

## 2024-07-11 DIAGNOSIS — R73.01 IMPAIRED FASTING GLUCOSE: ICD-10-CM

## 2024-07-11 DIAGNOSIS — Z86.19 HISTORY OF LYME DISEASE: ICD-10-CM

## 2024-07-11 DIAGNOSIS — E56.9 VITAMIN DEFICIENCY: ICD-10-CM

## 2024-07-11 LAB
ALBUMIN SERPL BCG-MCNC: 4.4 G/DL (ref 3.5–5.2)
ALP SERPL-CCNC: 65 U/L (ref 40–150)
ALT SERPL W P-5'-P-CCNC: 29 U/L (ref 0–50)
ANION GAP SERPL CALCULATED.3IONS-SCNC: 12 MMOL/L (ref 7–15)
AST SERPL W P-5'-P-CCNC: 29 U/L (ref 0–45)
BILIRUB SERPL-MCNC: 1.7 MG/DL
BUN SERPL-MCNC: 16.1 MG/DL (ref 6–20)
CALCIUM SERPL-MCNC: 9.6 MG/DL (ref 8.6–10)
CHLORIDE SERPL-SCNC: 107 MMOL/L (ref 98–107)
CHOLEST SERPL-MCNC: 202 MG/DL
CREAT SERPL-MCNC: 1.06 MG/DL (ref 0.51–0.95)
CRP SERPL-MCNC: <3 MG/L
DEPRECATED HCO3 PLAS-SCNC: 22 MMOL/L (ref 22–29)
EGFRCR SERPLBLD CKD-EPI 2021: 63 ML/MIN/1.73M2
ERYTHROCYTE [DISTWIDTH] IN BLOOD BY AUTOMATED COUNT: 12.1 % (ref 10–15)
ERYTHROCYTE [SEDIMENTATION RATE] IN BLOOD BY WESTERGREN METHOD: 8 MM/HR (ref 0–30)
FASTING STATUS PATIENT QL REPORTED: YES
FASTING STATUS PATIENT QL REPORTED: YES
FERRITIN SERPL-MCNC: 153 NG/ML (ref 11–328)
FIBRINOGEN PPP-MCNC: 258 MG/DL (ref 170–490)
GGT SERPL-CCNC: 32 U/L (ref 5–36)
GLUCOSE SERPL-MCNC: 126 MG/DL (ref 70–99)
HBA1C MFR BLD: 5.4 % (ref 0–5.6)
HCT VFR BLD AUTO: 41.4 % (ref 35–47)
HDLC SERPL-MCNC: 46 MG/DL
HGB BLD-MCNC: 14.8 G/DL (ref 11.7–15.7)
LDLC SERPL CALC-MCNC: 131 MG/DL
LH SERPL-ACNC: 50.5 MIU/ML
MAGNESIUM SERPL-MCNC: 2.1 MG/DL (ref 1.7–2.3)
MCH RBC QN AUTO: 31.2 PG (ref 26.5–33)
MCHC RBC AUTO-ENTMCNC: 35.7 G/DL (ref 31.5–36.5)
MCV RBC AUTO: 87 FL (ref 78–100)
NONHDLC SERPL-MCNC: 156 MG/DL
PLATELET # BLD AUTO: 192 10E3/UL (ref 150–450)
POTASSIUM SERPL-SCNC: 4.2 MMOL/L (ref 3.4–5.3)
PROT SERPL-MCNC: 7.2 G/DL (ref 6.4–8.3)
RBC # BLD AUTO: 4.75 10E6/UL (ref 3.8–5.2)
RETICS # AUTO: 0.12 10E6/UL (ref 0.03–0.1)
RETICS/RBC NFR AUTO: 2.7 % (ref 0.5–2)
SODIUM SERPL-SCNC: 141 MMOL/L (ref 135–145)
T3FREE SERPL-MCNC: 3.4 PG/ML (ref 2–4.4)
T4 FREE SERPL-MCNC: 1.31 NG/DL (ref 0.9–1.7)
TRIGL SERPL-MCNC: 125 MG/DL
TSH SERPL DL<=0.005 MIU/L-ACNC: 1.59 UIU/ML (ref 0.3–4.2)
VIT B12 SERPL-MCNC: 1362 PG/ML (ref 232–1245)
VIT D+METAB SERPL-MCNC: 111 NG/ML (ref 20–50)
WBC # BLD AUTO: 4.9 10E3/UL (ref 4–11)

## 2024-07-11 PROCEDURE — 80061 LIPID PANEL: CPT

## 2024-07-11 PROCEDURE — 84481 FREE ASSAY (FT-3): CPT

## 2024-07-11 PROCEDURE — 82977 ASSAY OF GGT: CPT

## 2024-07-11 PROCEDURE — 84482 T3 REVERSE: CPT | Mod: 90

## 2024-07-11 PROCEDURE — 83036 HEMOGLOBIN GLYCOSYLATED A1C: CPT

## 2024-07-11 PROCEDURE — 80053 COMPREHEN METABOLIC PANEL: CPT

## 2024-07-11 PROCEDURE — 82728 ASSAY OF FERRITIN: CPT

## 2024-07-11 PROCEDURE — 83735 ASSAY OF MAGNESIUM: CPT

## 2024-07-11 PROCEDURE — 86800 THYROGLOBULIN ANTIBODY: CPT

## 2024-07-11 PROCEDURE — 85027 COMPLETE CBC AUTOMATED: CPT

## 2024-07-11 PROCEDURE — 82306 VITAMIN D 25 HYDROXY: CPT

## 2024-07-11 PROCEDURE — 84439 ASSAY OF FREE THYROXINE: CPT

## 2024-07-11 PROCEDURE — 84443 ASSAY THYROID STIM HORMONE: CPT

## 2024-07-11 PROCEDURE — 83002 ASSAY OF GONADOTROPIN (LH): CPT

## 2024-07-11 PROCEDURE — 82607 VITAMIN B-12: CPT

## 2024-07-11 PROCEDURE — 85045 AUTOMATED RETICULOCYTE COUNT: CPT

## 2024-07-11 PROCEDURE — 99000 SPECIMEN HANDLING OFFICE-LAB: CPT

## 2024-07-11 PROCEDURE — 36415 COLL VENOUS BLD VENIPUNCTURE: CPT

## 2024-07-11 PROCEDURE — 86140 C-REACTIVE PROTEIN: CPT

## 2024-07-11 PROCEDURE — 85652 RBC SED RATE AUTOMATED: CPT

## 2024-07-11 PROCEDURE — 85384 FIBRINOGEN ACTIVITY: CPT

## 2024-07-12 LAB — THYROGLOB AB SERPL IA-ACNC: <20 IU/ML

## 2024-07-14 LAB — T3REVERSE SERPL-MCNC: 13.8 NG/DL

## 2024-07-17 ENCOUNTER — TELEPHONE (OUTPATIENT)
Dept: FAMILY MEDICINE | Facility: CLINIC | Age: 54
End: 2024-07-17
Payer: COMMERCIAL

## 2024-07-17 NOTE — TELEPHONE ENCOUNTER
----- Message from Tia Ramírez sent at 7/17/2024  7:37 AM CDT -----  Patient hasn't viewed Style[facility] results message- please call with results and recommendations.         Left message to call back for: patient  Information to relay to patient:     All labs results from 7/11/24 lab work:    Your vitamin D level is pretty high- recommend decreasing your vitamin D supplement by about 1/2 then recheck in a few months  One kidney test is borderline high but stable.

## 2024-07-18 NOTE — TELEPHONE ENCOUNTER
Call placed relating Lab results, I left a message on Identifiable voice message regarding Vitamin D level elevated and instructions to decrease  the dose recheck in 2 months. .  Shayla Ocampo on 7/18/2024 at 1:56 PM

## 2024-08-27 ENCOUNTER — PATIENT OUTREACH (OUTPATIENT)
Dept: CARE COORDINATION | Facility: CLINIC | Age: 54
End: 2024-08-27

## 2024-08-27 ENCOUNTER — OFFICE VISIT (OUTPATIENT)
Dept: OBGYN | Facility: CLINIC | Age: 54
End: 2024-08-27
Payer: COMMERCIAL

## 2024-08-27 ENCOUNTER — ANCILLARY ORDERS (OUTPATIENT)
Dept: FAMILY MEDICINE | Facility: CLINIC | Age: 54
End: 2024-08-27

## 2024-08-27 VITALS
BODY MASS INDEX: 34.3 KG/M2 | HEART RATE: 80 BPM | HEIGHT: 66 IN | DIASTOLIC BLOOD PRESSURE: 90 MMHG | SYSTOLIC BLOOD PRESSURE: 141 MMHG | WEIGHT: 213.4 LBS | OXYGEN SATURATION: 97 %

## 2024-08-27 DIAGNOSIS — N95.0 POSTMENOPAUSAL BLEEDING: Primary | ICD-10-CM

## 2024-08-27 DIAGNOSIS — R93.89 ENDOMETRIAL THICKENING ON ULTRASOUND: ICD-10-CM

## 2024-08-27 DIAGNOSIS — Z12.31 VISIT FOR SCREENING MAMMOGRAM: Primary | ICD-10-CM

## 2024-08-27 PROCEDURE — 99213 OFFICE O/P EST LOW 20 MIN: CPT | Mod: 25 | Performed by: OBSTETRICS & GYNECOLOGY

## 2024-08-27 PROCEDURE — 58100 BIOPSY OF UTERUS LINING: CPT | Mod: 52 | Performed by: OBSTETRICS & GYNECOLOGY

## 2024-08-27 ASSESSMENT — PATIENT HEALTH QUESTIONNAIRE - PHQ9: SUM OF ALL RESPONSES TO PHQ QUESTIONS 1-9: 1

## 2024-08-27 NOTE — PATIENT INSTRUCTIONS
Endometrial Biopsy  Post-Procedure Patient Instructions      Please monitor for any of the following:      Fever   Cramping after 48 hours   Bleeding for 24-48 hours that is heavier than a normal period   Any bleeding that soaks more than 1 pad per hour      Please call your health care provider if you develop any of the above symptoms or problems.     [facility]   Nurse Triage Line [phone]      Use pads, not tampons, for the bleeding. You may resume sexual relations in 2-3 days after bleeding has stopped.

## 2024-08-27 NOTE — PROGRESS NOTES
"S; Mira Luis is a 54 year old  who presented 3 months ago with  bleeding while on HRT.  She ultimately had an ultrasound that showed a 12mm lining and returns today for EMB.  She denies any bleeding since initial episode.  She is otherwise without complaints.    O: BP (!) 141/90   Pulse 80   Ht 1.676 m (5' 6\")   Wt 96.8 kg (213 lb 6.4 oz)   LMP 2020   SpO2 97%   BMI 34.44 kg/m      Psych: normal affect, appropriate eye contact  Resp: no increased work of breathing  CV: no peripheral edema  Abd; SNT, no palpable masses  Lymph: no enlarged inquinal nodes  Pelvic: atrophic vulva and vagina, small cervix with pinpoint os barely visible.  No cmt  Skin: no visible rashes or lesions.    PROCEDURE:  After informed consent was obtained from the patient, a speculum was placed in the vagina to visualize the cervix.  The cervix was then swabbed with a hibiclens prep.  Tenaculum was applied to the anterior cervical lip. Endometrial biopsy pipelle was unable to pass through external os so flexible blue os finder was attempted, again without success.  3mm ady attempted and could not get through external os and smaller dilator was not available, so procedure terminated at this time.Tenaculum was removed and sites were hemostatic. There were no complications. The patient tolerated the procedure well with a minimal amount of cramping noted.      A/P:  bleeding with thickened endometrial.  Failed EMB   We discussed need for D&C, ideally hysteroscopy with myosure if cervix can be dilated to accommodate.  We discussed the procedure in detail and I explained if unable to dilate to allow hysteroscope, would attempt D&C only vs EMB to obtain sample.  She is agreeable with plan.  She may be losing her insurance soon, so we will attempt to schedule asap.   We discussed the risks of the surgery, including, but not limited to, risk of anesthesia, bleeding, infection, injury to cervix, vagina or uterus, including " perforation and the possible need for a more invasive surgery like an exploratory laparotomy. She gives her verbal consent and all questions were answered.    JACKLYN MORA MD

## 2024-08-28 ENCOUNTER — TELEPHONE (OUTPATIENT)
Dept: OBGYN | Facility: CLINIC | Age: 54
End: 2024-08-28
Payer: COMMERCIAL

## 2024-08-28 DIAGNOSIS — R93.89 ENDOMETRIAL THICKENING ON ULTRASOUND: Primary | ICD-10-CM

## 2024-08-28 NOTE — TELEPHONE ENCOUNTER
FUTURE VISIT INFORMATION      SURGERY INFORMATION:  Date: 24  Location: ur or  Surgeon:  Maria Elena Saavedra MD   Anesthesia Type:  MAC with Local  Procedure: HYSTEROSCOPY, WITH DILATION AND CURETTAGE OF UTERUS USING MORCELLATOR   Consult: ov 24    RECORDS REQUESTED FROM:       Primary Care Provider: Tia Ramírez CNP - [facility]    Most recent EKG+ [id number]    Most recent Cardiac Stress Test: 22

## 2024-08-28 NOTE — TELEPHONE ENCOUNTER
With recent failed EMB, patient wondering if there's anything she should be doing to prepare for procedure next week to ensure that procedure is successful.     Informed patient that I would send a message to DANIELLE and would send response back to patient in a Ondoret message, patient was agreeable.     Routing to  for guidance: anything additional she needs to do to prep?      Laura  She/her/hers  [facility]

## 2024-08-28 NOTE — TELEPHONE ENCOUNTER
Type of surgery: gyn  Location of surgery: [facility] OR  Date and time of surgery: 09/06/24 8:50AM  Surgeon: Quentin  Pre-Op Appt Date: 08/30/24 PAC  Post-Op Appt Date: not needed   Packet sent out: Yes  Pre-cert/Authorization completed:  Not Applicable  Date: 08/28/24     ANUJ Sanchez  She/her/hers  [facility]

## 2024-08-30 ENCOUNTER — ANESTHESIA EVENT (OUTPATIENT)
Dept: SURGERY | Facility: CLINIC | Age: 54
End: 2024-08-30
Payer: COMMERCIAL

## 2024-08-30 ENCOUNTER — OFFICE VISIT (OUTPATIENT)
Dept: SURGERY | Facility: CLINIC | Age: 54
End: 2024-08-30
Payer: COMMERCIAL

## 2024-08-30 ENCOUNTER — PRE VISIT (OUTPATIENT)
Dept: SURGERY | Facility: CLINIC | Age: 54
End: 2024-08-30

## 2024-08-30 VITALS
RESPIRATION RATE: 16 BRPM | HEART RATE: 71 BPM | OXYGEN SATURATION: 100 % | WEIGHT: 215 LBS | TEMPERATURE: 97.9 F | HEIGHT: 66 IN | BODY MASS INDEX: 34.55 KG/M2 | SYSTOLIC BLOOD PRESSURE: 124 MMHG | DIASTOLIC BLOOD PRESSURE: 86 MMHG

## 2024-08-30 DIAGNOSIS — Z01.818 PRE-OPERATIVE EXAMINATION: Primary | ICD-10-CM

## 2024-08-30 DIAGNOSIS — N95.0 POSTMENOPAUSAL BLEEDING: ICD-10-CM

## 2024-08-30 DIAGNOSIS — Z01.818 PRE-OP EXAM: Primary | ICD-10-CM

## 2024-08-30 PROCEDURE — 99203 OFFICE O/P NEW LOW 30 MIN: CPT | Performed by: PHYSICIAN ASSISTANT

## 2024-08-30 RX ORDER — CALCIUM/MAGNESIUM/ZINC 333-133 MG
1 TABLET ORAL EVERY EVENING
COMMUNITY

## 2024-08-30 ASSESSMENT — LIFESTYLE VARIABLES: TOBACCO_USE: 0

## 2024-08-30 ASSESSMENT — PAIN SCALES - GENERAL: PAINLEVEL: NO PAIN (0)

## 2024-08-30 ASSESSMENT — ENCOUNTER SYMPTOMS: SEIZURES: 0

## 2024-08-30 NOTE — H&P
Pre-Operative H & P     CC:  Preoperative exam to assess for increased cardiopulmonary risk while undergoing surgery and anesthesia.    Date of Encounter: 8/30/2024  Primary Care Physician:  Tia Ramírez     Reason for visit:   Encounter Diagnoses   Name Primary?    Pre-operative examination Yes    Postmenopausal bleeding        HPI  Mira Luis is a 54 year old female who presents for pre-operative H & P in preparation for  Procedure Information       Case: [id number] Date/Time: 09/06/24 0850    Procedure: HYSTEROSCOPY, WITH DILATION AND CURETTAGE OF UTERUS USING MORCELLATOR (Uterus)    Anesthesia type: MAC with Local    Diagnosis:       Postmenopausal bleeding [N95.0]      Endometrial thickening on ultrasound [R93.89]    Pre-op diagnosis:       Postmenopausal bleeding [N95.0]      Endometrial thickening on ultrasound [R93.89]    [facility]: [facility]    Providers: Maria Elena Saavedra MD            The patient is a 54-year-old woman with a past medical history significant for palpitations, hyperlipidemia, migraines, impaired fasting glucose, obesity, increased bilirubin, kidney disease, history of Lyme's disease and postmenopausal bleeding.  She was seen by Dr. Saavedra in the office.  Her endometrial biopsy but this was unable to be completed.  Given this she was recommended to undergo the procedure as above.    History is obtained from the patient and chart review    Hx of abnormal bleeding or anti-platelet use: none    Menstrual history: Patient's last menstrual period was 07/14/2020.:      Past Medical History  Past Medical History:   Diagnosis Date    AGW (anogenital warts)     Arthritis of first metatarsophalangeal (MTP) joint of left foot 07/30/2021    ASCUS (atypical squamous cells of undetermined significance) on Pap smear 11/11/2013    neg HPV. plan to repeat pap/HPV in 1 year    CKD (chronic kidney disease)     Headache     Heart palpitations 10/1998    LSIL (low grade squamous intraepithelial  lesion) on Pap smear 10/12/11, 6/2012 11/11/11 colp - LSIL    Lyme disease     Migraines Age 20's    Cluster    Obesity     PONV (postoperative nausea and vomiting)     Postmenopausal bleeding     Reticulocytosis        Past Surgical History  Past Surgical History:   Procedure Laterality Date    CHEILECTOMY Left 10/19/2021    Procedure: LEFT FOOT CHEILECTOMY;  Surgeon: Arturo Harris DPM;  Location: [facility]    COLONOSCOPY N/A 8/19/2021    Procedure: COLONOSCOPY;  Surgeon: Corwin Remy MD;  Location: [facility]    EXCISE VULVA WIDE LOCAL  1/16/2013    Procedure: EXCISE VULVA WIDE LOCAL;  Vaginal wide local excision of the vulva.   Diagnosis: vulva chondyloma.;  Surgeon: Pretty Collado MD;  Location: [facility]    EXCISE VULVA WIDE LOCAL      SURGICAL HISTORY OF -   2/2008    Fibula Fx       Prior to Admission Medications  Current Outpatient Medications   Medication Sig Dispense Refill    BIESTROGEN, 20-80, 1.25 MG COMPOUND Apply 2 mg topically every evening. 50/50 2MG/GRAM APPLY 2 PUMPS TOPICALLY ONCE A DAY      COMPOUNDED NON-CONTROLLED SUBSTANCE (CMPD RX) - PHARMACY TO MIX COMPOUNDED MEDICATION BI-EST 50:50 2mg/g- apply 2 clicks once a day 90 g 3    COMPOUNDED NON-CONTROLLED SUBSTANCE (CMPD RX) - PHARMACY TO MIX COMPOUNDED MEDICATION Progesterone DR  150 mg capsule- take one at HS (Patient taking differently: Take 1 capsule by mouth every evening. Progesterone DR  150 mg capsule- take one at HS) 90 capsule 3    MAGNESIUM GLYCINATE PO Take 1 capsule by mouth every morning. MAGNESIUM THREONATE      Milk Thistle-Dand-Fennel-Licor (MILK THISTLE XTRA) CAPS capsule Take 1 capsule by mouth every evening.      Multiple Vitamin (ONE-A-DAY ESSENTIAL) TABS Take 1 tablet by mouth every morning.      progesterone (PROMETRIUM) 100 MG capsule Take 150 mg by mouth At Bedtime      UNABLE TO FIND Take 1 tablet by mouth every morning.         Allergies  Allergies   Allergen Reactions    Shellfish Allergy      Throat closed     Sulfa Antibiotics        Social History  Social History     Socioeconomic History    Marital status:      Spouse name: Not on file    Number of children: Not on file    Years of education: Not on file    Highest education level: Bachelor's degree (e.g., BA, AB, BS)   Occupational History    Not on file   Tobacco Use    Smoking status: Never     Passive exposure: Never    Smokeless tobacco: Never   Vaping Use    Vaping status: Never Used   Substance and Sexual Activity    Alcohol use: Not Currently    Drug use: No    Sexual activity: Yes     Partners: Male     Comment: Aviane   Other Topics Concern    Parent/sibling w/ CABG, MI or angioplasty before 65F 55M? Not Asked     Service No    Blood Transfusions No    Caffeine Concern No    Occupational Exposure No    Hobby Hazards No    Sleep Concern No    Stress Concern No    Weight Concern No    Special Diet No    Back Care No    Exercise Yes    Bike Helmet No    Seat Belt Yes    Self-Exams Yes   Social History Narrative    Not on file     Social Determinants of Health     Financial Resource Strain: High Risk (1/1/2022)    Received from Status Overload Pending sale to [facility], Status Overload Pending sale to [facility]    Financial Resource Strain     Difficulty of Paying Living Expenses: Not on file     Difficulty of Paying Living Expenses: Not on file   Food Insecurity: Not on file   Transportation Needs: Not on file   Physical Activity: Insufficiently Active (7/22/2019)    Exercise Vital Sign     Days of Exercise per Week: 1 day     Minutes of Exercise per Session: 20 min   Stress: No Stress Concern Present (7/22/2019)    [employer] [place] [employer] - Occupational Stress Questionnaire     Feeling of Stress : Not at all   Social Connections: Unknown (1/1/2022)    [facility] from [facility], [facility]    Social Connections     Frequency of Communication with Friends and  Family: Not on file   Interpersonal Safety: Not on file   Housing Stability: Not on file       Family History  Family History   Problem Relation Age of Onset    Ulcerative Colitis Mother     Arrhythmia Mother         s/p ablation    Hypotension Father     Hypertension Maternal Grandmother     Cancer Maternal Aunt     Cancer Paternal Aunt     Diabetes No family hx of     Cerebrovascular Disease No family hx of     Thyroid Disease No family hx of     Glaucoma No family hx of     Macular Degeneration No family hx of     Colon Cancer No family hx of     Anesthesia Reaction No family hx of     Deep Vein Thrombosis (DVT) No family hx of        Review of Systems  The complete review of systems is negative other than noted in the HPI or here.   Anesthesia Evaluation   Pt has had prior anesthetic. Type: MAC.    History of anesthetic complications  - PONV.      ROS/MED HX  ENT/Pulmonary:  - neg pulmonary ROS  (-) tobacco use   Neurologic:    (-) no seizures, no CVA, no TIA and migraines   Cardiovascular:     (+) Dyslipidemia - -   -  - -                          Irregular Heartbeat/Palpitations,       Previous cardiac testing   Echo: Date: Results:    Stress Test:  Date: 11/16/22 Results:  Interpretation Summary     This was a normal stress EKG with no evidence of stress-induced ischemia    ECG Reviewed:  Date: 2013 Results:  SR  Cath:  Date: Results:      METS/Exercise Tolerance: >4 METS    Hematologic:  - neg hematologic  ROS     Musculoskeletal:  - neg musculoskeletal ROS     GI/Hepatic: Comment: Increased bilirubin  - neg GI/hepatic ROS  (-) GERD   Renal/Genitourinary: Comment: Postmenopausal bleeding  Endometrial thickening on US    (+) renal disease,             Endo: Comment: Impaired fasting glucose    (+)               Obesity,       Psychiatric/Substance Use:  - neg psychiatric ROS     Infectious Disease: Comment: History of lymes disease       Malignancy:  - neg malignancy ROS     Other:  - neg other ROS     "      /86 (BP Location: Right arm, Patient Position: Sitting, Cuff Size: Adult Large)   Pulse 71   Temp 97.9  F (36.6  C) (Oral)   Resp 16   Ht 1.676 m (5' 6\")   Wt 97.5 kg (215 lb)   LMP 07/14/2020   SpO2 100%   Breastfeeding No   BMI 34.70 kg/m      Physical Exam   Constitutional: Awake, alert, cooperative, no apparent distress, and appears stated age.  Eyes: Pupils equal, round and reactive to light, extra ocular muscles intact, sclera clear, conjunctiva normal.  HENT: Normocephalic, oral pharynx with moist mucus membranes, good dentition. No goiter appreciated.   Respiratory: Clear to auscultation bilaterally, no crackles or wheezing.  Cardiovascular: Regular rate and rhythm, normal S1 and S2, and no murmur noted.  Carotids +2, no bruits. No edema. Palpable pulses to radial  DP and PT arteries.   GI: Normal bowel sounds, soft, non-distended, non-tender, no masses palpated, no hepatosplenomegaly.  Lymph/Hematologic: No cervical lymphadenopathy and no supraclavicular lymphadenopathy.  Genitourinary:  defer  Skin: Warm and dry.  No rashes at anticipated surgical site.   Musculoskeletal: Full ROM of neck. There is no redness, warmth, or swelling of the joints. Gross motor strength is normal.    Neurologic: Awake, alert, oriented to name, place and time. Cranial nerves II-XII are grossly intact. Gait is normal.   Neuropsychiatric: Calm, cooperative. Normal affect.     Prior Labs/Diagnostic Studies   All labs and imaging personally reviewed    Latest Reference Range & Units 07/11/24 09:20   Sodium 135 - 145 mmol/L 141   Potassium 3.4 - 5.3 mmol/L 4.2   Chloride 98 - 107 mmol/L 107   Carbon Dioxide (CO2) 22 - 29 mmol/L 22   Urea Nitrogen 6.0 - 20.0 mg/dL 16.1   Creatinine 0.51 - 0.95 mg/dL 1.06 (H)   GFR Estimate >60 mL/min/1.73m2 63   Calcium 8.6 - 10.0 mg/dL 9.6   Anion Gap 7 - 15 mmol/L 12   Magnesium 1.7 - 2.3 mg/dL 2.1   Albumin 3.5 - 5.2 g/dL 4.4   Protein Total 6.4 - 8.3 g/dL 7.2   Alkaline " Phosphatase 40 - 150 U/L 65   ALT 0 - 50 U/L 29   AST 0 - 45 U/L 29   Bilirubin Total <=1.2 mg/dL 1.7 (H)   Cholesterol <200 mg/dL 202 (H)   CRP Inflammation <5.00 mg/L <3.00   Patient Fasting?  Yes  Yes   Ferritin 11 - 328 ng/mL 153   Free T3 2.0 - 4.4 pg/mL 3.4   GGT 5 - 36 U/L 32   Glucose 70 - 99 mg/dL 126 (H)   HDL Cholesterol >=50 mg/dL 46 (L)   Hemoglobin A1C 0.0 - 5.6 % 5.4   LDL Cholesterol Calculated <=100 mg/dL 131 (H)   Luteinizing Hormone mIU/mL 50.5   Non HDL Cholesterol <130 mg/dL 156 (H)   T3, Reverse ng/dL 9.0 - 27.0 ng/dL 13.8   T4 Free 0.90 - 1.70 ng/dL 1.31   Thyroglobulin Antibody <40 IU/mL <20   Triglycerides <150 mg/dL 125   TSH 0.30 - 4.20 uIU/mL 1.59   Vitamin B12 232 - 1,245 pg/mL 1,362 (H)   Vitamin D, Total (25-Hydroxy) 20 - 50 ng/mL 111 (H)   WBC 4.0 - 11.0 10e3/uL 4.9   Hemoglobin 11.7 - 15.7 g/dL 14.8   Hematocrit 35.0 - 47.0 % 41.4   Platelet Count 150 - 450 10e3/uL 192   RBC Count 3.80 - 5.20 10e6/uL 4.75   MCV 78 - 100 fL 87   MCH 26.5 - 33.0 pg 31.2   MCHC 31.5 - 36.5 g/dL 35.7   RDW 10.0 - 15.0 % 12.1   % Retic 0.5 - 2.0 % 2.7 (H)   Absolute Retic 0.025 - 0.095 10e6/uL 0.124 (H)   Sed Rate 0 - 30 mm/hr 8   Fibrinogen 170 - 490 mg/dL 258   (H): Data is abnormally high  (L): Data is abnormally low    EKG/ stress test - if available please see in ROS above     Zio 11/2022      The patient's records and results personally reviewed by this provider.     Outside records reviewed from: Care Everywhere    Assessment    Mira Luis is a 54 year old female seen as a PAC referral for risk assessment and optimization for anesthesia.    Plan/Recommendations  Pt will be optimized for the proposed procedure.  See below for details on the assessment, risk, and preoperative recommendations    NEUROLOGY  - No history of TIA, CVA or seizure  -Post Op delirium risk factors:  No risk identified    ENT  - No current airway concerns.  Will need to be reassessed day of surgery.  Mallampati: II - small  "mouth opening   TM: > 3    CARDIAC  - Hyperlipidemia  Working with functional doctor.   - History of palpitations. The patient had a Zio patch and stress test both unremarkable. Following with PCP at this time.   - METS (Metabolic Equivalents)  Patient performs 4 or more METS exercise without symptoms             Total Score: 0      RCRI-Very low risk: Class 1 0.4% complication rate             Total Score: 0    ~ The patient just completed a triathlon. She reports with her high stress job and hormone levels she is having some palpitations.     PULMONARY  - Obstructive Sleep Apnea  No current risk of obstructive sleep apnea   ARCHIE Low Risk             Total Score: 1    ARCHIE: Over 50 ys old      - Denies asthma or inhaler use  - Tobacco History    History   Smoking Status    Never   Smokeless Tobacco    Never       GI  - On recent labs from 7/11/24 found to have increased bilirubin. No underlying liver issues.   PONV High Risk  Total Score: 3           1 AN PONV: Pt is Female    1 AN PONV: Patient is not a current smoker    1 AN PONV: Patient has history of PONV    ~ The patient reports her previous PONV was more related to pain medication after surgery and not related to anesthesia.     /RENAL  - Baseline Creatinine  1.06 which is mildly elevated- patient denies any urinary symptoms.   ~ PMB/EMB - procedure as above.     ENDOCRINE    - BMI: Estimated body mass index is 34.7 kg/m  as calculated from the following:    Height as of this encounter: 1.676 m (5' 6\").    Weight as of this encounter: 97.5 kg (215 lb).  Obesity (BMI >30)  - No history of Diabetes Mellitus    HEME  VTE Low Risk 0.26%             Total Score: 0      - No history of abnormal bleeding or antiplatelet use.  ~ The patient has lab scheduled with Dr. Saavedra for 9/4/24 to complete a CBC and Type and Screen.     MSK  Patient is NOT Frail             Total Score: 0      ID  - History of lymes disease. She reports her migraines were related to her lymes " disease but this now managed with diet and decreasing stress.     Different anesthesia methods/types have been discussed with the patient, but they are aware that the final plan will be decided by the assigned anesthesia provider on the date of service.    The patient is optimized for their procedure. AVS with information on surgery time/arrival time, meds and NPO status given by nursing staff. No further diagnostic testing indicated.      On the day of service:     Prep time: 6 minutes  Visit time: 10 minutes  Documentation time: 11 minutes  ------------------------------------------  Total time: 27 minutes      Tabby Palafox PA-C  Preoperative Assessment Center  [facility]  Clinic and Surgery Center  Phone: [phone]  Fax: [phone]

## 2024-08-30 NOTE — PATIENT INSTRUCTIONS
Preparing for Your Surgery      Name:  Mira Luis   MRN:  [id number]   :  1970   Today's Date:  2024       Arriving for surgery:  Surgery date:  24  Arrival time:  6:50 am  Surgery time: 8:50 am    Please come to:     Please come to:       [facility] [address]   [address] [place]  [address]     The Green Ramp for patients and visitors is beneath the [facility]. The parking facility entrance is at the intersection of [address] [place] [address] and [address]. Patients and visitors who self-park will receive the reduced hospital parking rate (no ticket validation needed).     [employer] parking, located at the [facility] main entrance on [address] [place] [address], is available Monday - Friday from 7 am to 3:30 pm.     Discounted parking pass options can be purchased from  attendants during business hours.     -Check in at the security desk in the [facility] ([facility])   Lobby. They will direct you to the correct elevators.   -Proceed to the 3rd floor, Adult Surgery Waiting Lounge. [phone]     If you need directions, a wheelchair or escort please stop at the Information Desk in the lobby.  Inform the information person you are here for surgery; a wheelchair and escort to [address] will be provided.   An escort to the Adult Surgery Waiting Lounge will be provided. .    What can I eat or drink?  -  You may eat and drink normally up to 8 hours prior to arrival time. (Until 10:50 pm on 24)  -  You may have clear liquids until 2 hours prior to arrival time. (Until 4:50 am on 24)    Examples of clear liquids:  Water  Clear broth  Juices (apple, white grape, white cranberry  and cider) without pulp  Noncarbonated, powder based beverages  (lemonade and [place]-Aid)  Sodas (Sprite, 7-Up, ginger ale and seltzer)  Coffee or tea (without milk or cream)  Gatorade    -   No Alcohol or cannabis products for at least 24 hours before surgery.     Which medicines can I take?    Hold Aspirin for 7 days before surgery.   Hold Multivitamins for 7 days before surgery.  Hold Supplements for 7 days before surgery.  Hold Ibuprofen (Advil, Motrin) for 1 day(s) before surgery--unless otherwise directed by surgeon.  Hold Naproxen (Aleve) for 4 days before surgery.    -  DO NOT take these medications the day of surgery:  Magnesium  Topical medications    -  PLEASE TAKE these medications at their normal times:  Biestrogen  Progesterone    How do I prepare myself?  - Please take 2 showers (one the night prior to surgery and one the morning of surgery) using Scrubcare or Hibiclens soap.    Use this soap only from the neck to your toes. Do not use in genital area.     Leave the soap on your skin for one minute--then rinse thoroughly.      You may use your own shampoo and conditioner. No other hair products.     Sleep in clean sheets and wear clean clothes.   - Please remove all jewelry and body piercings.  - No lotions, deodorants or fragrance.  - No makeup or fingernail polish.   - Bring your ID and insurance card.    -If you use a CPAP machine, please bring the CPAP machine, tubing, and mask to hospital.    -If you have a Deep Brain Stimulator, Spinal Cord Stimulator, or any Neuro Stimulator device---you must bring the remote control to the hospital.      ALL PATIENTS GOING HOME THE SAME DAY OF SURGERY ARE REQUIRED TO HAVE A RESPONSIBLE ADULT TO DRIVE AND BE IN ATTENDANCE WITH THEM FOR 24 HOURS FOLLOWING SURGERY.    Covid testing policy as of 12/06/2022  Your surgeon will notify and schedule you for a COVID test if one is needed before surgery--please direct any questions or COVID symptoms to your surgeon      Questions or Concerns:    - For any questions regarding the day of surgery or your hospital stay, please contact the Pre Admission Nursing Office at [phone].       - If you have health  changes between today and your surgery, please call your surgeon.       - For questions after surgery, please call your surgeons office.           Current Visitor Guidelines    You may have 2 visitors in the pre op area.    Visiting hours: 8 a.m. to 8:30 p.m.    Patients confirmed or suspected to have symptoms of COVID 19 or flu:     No visitors allowed for adult patients.   Children (under age 18) can have 1 named visitor.     People who are sick or showing symptoms of COVID 19 or flu:    Are not allowed to visit patients--we can only make exceptions in special situations.       Please follow these guidelines for your visit:          Please maintain social distance          Masking is optional--however at times you may be asked to wear a mask for the safety of yourself and others     Clean your hands with alcohol hand . Do this when you arrive at and leave the building and patient room,    And again after you touch your mask or anything in the room.     Go directly to and from the room you are visiting.     Stay in the patient s room during your visit. Limit going to other places in the hospital as much as possible     Leave bags and jackets at home or in the car.     For everyone s health, please don t come and go during your visit. That includes for smoking   during your visit.

## 2024-09-01 ENCOUNTER — TRANSFERRED RECORDS (OUTPATIENT)
Dept: MULTI SPECIALTY CLINIC | Facility: CLINIC | Age: 54
End: 2024-09-01

## 2024-09-01 LAB — RETINOPATHY: NORMAL

## 2024-09-03 LAB
ABO/RH(D): NORMAL
ANTIBODY SCREEN: NEGATIVE
SPECIMEN EXPIRATION DATE: NORMAL

## 2024-09-03 RX ORDER — MISOPROSTOL 200 UG/1
400 TABLET ORAL ONCE
Qty: 2 TABLET | Refills: 0 | Status: SHIPPED | OUTPATIENT
Start: 2024-09-03 | End: 2024-09-03

## 2024-09-03 NOTE — TELEPHONE ENCOUNTER
It would not be a bad idea to try some miso prior to her D&C on Friday.  It may NOT work, but can't hurt.  I placed the order, please get her preferred pharmacy and sign the order.  She may feel crampy overnight, so ibuprofen as needed to help.  Thanks    JACKLYN MORA MD

## 2024-09-04 ENCOUNTER — LAB (OUTPATIENT)
Dept: LAB | Facility: CLINIC | Age: 54
End: 2024-09-04
Payer: COMMERCIAL

## 2024-09-04 DIAGNOSIS — N95.0 POSTMENOPAUSAL BLEEDING: ICD-10-CM

## 2024-09-04 DIAGNOSIS — Z01.818 PRE-OP EXAM: ICD-10-CM

## 2024-09-04 LAB
ERYTHROCYTE [DISTWIDTH] IN BLOOD BY AUTOMATED COUNT: 12.1 % (ref 10–15)
HCT VFR BLD AUTO: 41.6 % (ref 35–47)
HGB BLD-MCNC: 14.9 G/DL (ref 11.7–15.7)
MCH RBC QN AUTO: 31.8 PG (ref 26.5–33)
MCHC RBC AUTO-ENTMCNC: 35.8 G/DL (ref 31.5–36.5)
MCV RBC AUTO: 89 FL (ref 78–100)
PLATELET # BLD AUTO: 224 10E3/UL (ref 150–450)
RBC # BLD AUTO: 4.68 10E6/UL (ref 3.8–5.2)
WBC # BLD AUTO: 6.6 10E3/UL (ref 4–11)

## 2024-09-04 PROCEDURE — 86900 BLOOD TYPING SEROLOGIC ABO: CPT

## 2024-09-04 PROCEDURE — 36415 COLL VENOUS BLD VENIPUNCTURE: CPT

## 2024-09-04 PROCEDURE — 86901 BLOOD TYPING SEROLOGIC RH(D): CPT

## 2024-09-04 PROCEDURE — 85027 COMPLETE CBC AUTOMATED: CPT

## 2024-09-04 PROCEDURE — 86850 RBC ANTIBODY SCREEN: CPT

## 2024-09-05 NOTE — ANESTHESIA PREPROCEDURE EVALUATION
Anesthesia Pre-Procedure Evaluation    Patient: Mira Luis   MRN: [id number] : 1970        Procedure : Procedure(s):  HYSTEROSCOPY, WITH DILATION AND CURETTAGE OF UTERUS USING MORCELLATOR          Past Medical History:   Diagnosis Date     AGW (anogenital warts)      Arthritis of first metatarsophalangeal (MTP) joint of left foot 2021     ASCUS (atypical squamous cells of undetermined significance) on Pap smear 2013    neg HPV. plan to repeat pap/HPV in 1 year     CKD (chronic kidney disease)      Headache      Heart palpitations 10/1998     LSIL (low grade squamous intraepithelial lesion) on Pap smear 10/12/11, 11 colp - LSIL     Lyme disease      Migraines Age 20's    Cluster     Obesity      PONV (postoperative nausea and vomiting)      Postmenopausal bleeding      Reticulocytosis       Past Surgical History:   Procedure Laterality Date     CHEILECTOMY Left 10/19/2021    Procedure: LEFT FOOT CHEILECTOMY;  Surgeon: Arutro Harris DPM;  Location: [facility] OR     COLONOSCOPY N/A 2021    Procedure: COLONOSCOPY;  Surgeon: Corwin Rmey MD;  Location:  GI     EXCISE VULVA WIDE LOCAL  2013    Procedure: EXCISE VULVA WIDE LOCAL;  Vaginal wide local excision of the vulva.   Diagnosis: vulva chondyloma.;  Surgeon: Pretty Collado MD;  Location: [facility] OR     EXCISE VULVA WIDE LOCAL       SURGICAL HISTORY OF -   2008    Fibula Fx      Allergies   Allergen Reactions     Shellfish Allergy      Throat closed     Sulfa Antibiotics       Social History     Tobacco Use     Smoking status: Never     Passive exposure: Never     Smokeless tobacco: Never   Substance Use Topics     Alcohol use: Not Currently      Wt Readings from Last 1 Encounters:   24 97.5 kg (215 lb)        Anesthesia Evaluation   Pt has had prior anesthetic. Type: General.    History of anesthetic complications  - PONV.      ROS/MED HX  ENT/Pulmonary:       Neurologic:     (+)      migraines,                           Cardiovascular:     (+) Dyslipidemia - -   -  - -                          Irregular Heartbeat/Palpitations,            METS/Exercise Tolerance:     Hematologic:  - neg hematologic  ROS     Musculoskeletal:  - neg musculoskeletal ROS     GI/Hepatic:    (-) GERD   Renal/Genitourinary:     (+) renal disease, type: CRI,            Endo:     (+)               Obesity,       Psychiatric/Substance Use:  - neg psychiatric ROS     Infectious Disease: Comment: h/o Lyme Disease      Malignancy:  - neg malignancy ROS     Other:     (-) Any chance pregnant       Physical Exam    Airway        Mallampati: II   TM distance: > 3 FB   Neck ROM: full   Mouth opening: > 3 cm    Respiratory Devices and Support         Dental       (+) Minor Abnormalities - some fillings, tiny chips      Cardiovascular   cardiovascular exam normal          Pulmonary   pulmonary exam normal            OUTSIDE LABS:  CBC:   Lab Results   Component Value Date    WBC 6.6 09/04/2024    WBC 4.9 07/11/2024    HGB 14.9 09/04/2024    HGB 14.8 07/11/2024    HCT 41.6 09/04/2024    HCT 41.4 07/11/2024     09/04/2024     07/11/2024     BMP:   Lab Results   Component Value Date     07/11/2024     07/24/2023    POTASSIUM 4.2 07/11/2024    POTASSIUM 4.1 07/24/2023    CHLORIDE 107 07/11/2024    CHLORIDE 106 07/24/2023    CO2 22 07/11/2024    CO2 23 07/24/2023    BUN 16.1 07/11/2024    BUN 13.3 07/24/2023    CR 1.06 (H) 07/11/2024    CR 1.06 (H) 07/24/2023     (H) 07/11/2024     (H) 07/24/2023     COAGS:   Lab Results   Component Value Date    FIBR 258 07/11/2024     POC:   Lab Results   Component Value Date    HCG neg 01/16/2013     HEPATIC:   Lab Results   Component Value Date    ALBUMIN 4.4 07/11/2024    PROTTOTAL 7.2 07/11/2024    ALT 29 07/11/2024    AST 29 07/11/2024    GGT 32 07/11/2024    ALKPHOS 65 07/11/2024    BILITOTAL 1.7 (H) 07/11/2024     OTHER:   Lab Results   Component Value Date    A1C 5.4  "07/11/2024    CHUY 9.6 07/11/2024    MAG 2.1 07/11/2024    TSH 1.59 07/11/2024    T4 1.31 07/11/2024    CRP <2.9 06/28/2021    SED 8 07/11/2024       Anesthesia Plan    ASA Status:  3       Anesthesia Type: MAC.     - Reason for MAC: straight local not clinically adequate   Induction: Intravenous.   Maintenance: TIVA.        Consents    Anesthesia Plan(s) and associated risks, benefits, and realistic alternatives discussed. Questions answered and patient/representative(s) expressed understanding.     - Discussed: Risks, Benefits and Alternatives for BOTH SEDATION and the PROCEDURE were discussed     - Discussed with:  Patient      - Extended Intubation/Ventilatory Support Discussed: No.      - Patient is DNR/DNI Status: No     Use of blood products discussed: No .     Postoperative Care    Pain management: Multi-modal analgesia.   PONV prophylaxis: Background Propofol Infusion, Dexamethasone or Solumedrol, Ondansetron (or other 5HT-3)     Comments:               Sage Singh MD    I have reviewed the pertinent notes and labs in the chart from the past 30 days and (re)examined the patient.  Any updates or changes from those notes are reflected in this note.              # Obesity: Estimated body mass index is 34.7 kg/m  as calculated from the following:    Height as of 8/30/24: 1.676 m (5' 6\").    Weight as of 8/30/24: 97.5 kg (215 lb).      "

## 2024-09-05 NOTE — OP NOTE
Obstetrics & Gynecology Service  Operative Note    Surgery Date:  2024  Case ID: [id number]  Surgeon(s):  Maria Elena Saavedra MD  Assistant(s):  Tonie Tellez MD (PGY-1), Zackary Lou MD (PGY-4)     Preoperative Diagnoses:  Pre-Op Diagnosis Codes:     * Postmenopausal bleeding [N95.0]     * Endometrial thickening on ultrasound [R93.89]  Postoperative Diagnoses:  Same s/p procedures    Procedure(s): Exam under anesthesia, Hysteroscopy, Dilation & curettage  Complications: None    Specimens: Endometrial curettings  Drains/Packing: None    Anesthesia:  MAC  EBL: 5 mL  Fluid Deficit: 45 mL    Indication(s):   Ms. Mira Luis is a 54 year old  who presented with postmenopausal bleeding while on HRT. She had an ultrasound that showed a 12 mm lining, EMB was attempted but unsuccessful given inability to pass through the external cervical os. Hysteroscopy with D&C was recommended. The risks, benefits, and alternatives were discussed with the patient, and she agreed to proceed.     Findings:   EUA revealed retroverted uterus, mobile with no adnexal masses. Cervix not dilated. Able to dilate following incision of adhesive band at external os. Intrauterine findings included atrophic endometrium. Uterine wall gritty on all aspects. Tenaculum sites hemostatic at end of case.     Procedure Details:   The patient was brought to the operating room where adequate MAC anesthesia was administered. Exam under anesthesia revealed the findings noted above. The patient was prepped and draped in the usual sterile fashion. A sterile speculum was placed. The anterior lip of the cervix was grasped with a single tooth tenaculum. A paracervical block was performed with a total of 10 cc of 1% lidocaine injected at 4 and 8 o'clock in the cervicovaginal junction. The external cervical os remained very stenotic and appeared scarred. A 2mm hegar dilator and os finder were used to make external opening and then a Metzenbaum  scissors was used to lyse an adhesion at external os. The cervix was then easily dilated to a 6.5 Hegar dilator. The hysteroscope was placed, and the uterine cavity was explored with findings as above. Curettage of the endometrium via morcellation with a Myosure device was performed. The Myosure and hysteroscope were then removed, a sharp curette was performed yielding scant tissue.  This was followed by removal of the tenaculum with tenaculum sites noted to be hemostatic after pressure was applied with a ring forceps sponge stick. All instruments were removed from the vagina.     The sponge, needle, and instrument counts were correct. The patient tolerated the procedure well and was transferred to recovery in stable condition. Dr. Saavedra was present and scrubbed for the entirety of the procedure.     Zackary Lou MD, MPH  Ob/Gyn Resident, PGY-4  09/06/24 8:20 AM    I was scrubbed and present for entire procedure, agree with above operative note.    JACKLYN SAAVEDRA MD

## 2024-09-06 ENCOUNTER — HOSPITAL ENCOUNTER (OUTPATIENT)
Facility: CLINIC | Age: 54
Discharge: HOME | End: 2024-09-06
Attending: OBSTETRICS & GYNECOLOGY | Admitting: OBSTETRICS & GYNECOLOGY
Payer: COMMERCIAL

## 2024-09-06 ENCOUNTER — ANESTHESIA (OUTPATIENT)
Dept: SURGERY | Facility: CLINIC | Age: 54
End: 2024-09-06
Payer: COMMERCIAL

## 2024-09-06 VITALS
HEIGHT: 66 IN | OXYGEN SATURATION: 93 % | SYSTOLIC BLOOD PRESSURE: 130 MMHG | BODY MASS INDEX: 34.72 KG/M2 | TEMPERATURE: 97.9 F | RESPIRATION RATE: 16 BRPM | HEART RATE: 73 BPM | WEIGHT: 216.05 LBS | DIASTOLIC BLOOD PRESSURE: 90 MMHG

## 2024-09-06 DIAGNOSIS — Z98.890 STATUS POST HYSTEROSCOPY: Primary | ICD-10-CM

## 2024-09-06 LAB — HCG UR QL: NEGATIVE

## 2024-09-06 PROCEDURE — 272N000001 HC OR GENERAL SUPPLY STERILE: Performed by: OBSTETRICS & GYNECOLOGY

## 2024-09-06 PROCEDURE — 710N000012 HC RECOVERY PHASE 2, PER MINUTE: Performed by: OBSTETRICS & GYNECOLOGY

## 2024-09-06 PROCEDURE — 58558 HYSTEROSCOPY BIOPSY: CPT | Mod: GC | Performed by: OBSTETRICS & GYNECOLOGY

## 2024-09-06 PROCEDURE — 370N000017 HC ANESTHESIA TECHNICAL FEE, PER MIN: Performed by: OBSTETRICS & GYNECOLOGY

## 2024-09-06 PROCEDURE — 258N000003 HC RX IP 258 OP 636: Performed by: NURSE ANESTHETIST, CERTIFIED REGISTERED

## 2024-09-06 PROCEDURE — 250N000013 HC RX MED GY IP 250 OP 250 PS 637: Performed by: OBSTETRICS & GYNECOLOGY

## 2024-09-06 PROCEDURE — 58558 HYSTEROSCOPY BIOPSY: CPT | Performed by: ANESTHESIOLOGY

## 2024-09-06 PROCEDURE — 88305 TISSUE EXAM BY PATHOLOGIST: CPT | Mod: 26 | Performed by: PATHOLOGY

## 2024-09-06 PROCEDURE — 81025 URINE PREGNANCY TEST: CPT | Performed by: OBSTETRICS & GYNECOLOGY

## 2024-09-06 PROCEDURE — 999N000141 HC STATISTIC PRE-PROCEDURE NURSING ASSESSMENT: Performed by: OBSTETRICS & GYNECOLOGY

## 2024-09-06 PROCEDURE — 88305 TISSUE EXAM BY PATHOLOGIST: CPT | Mod: TC | Performed by: OBSTETRICS & GYNECOLOGY

## 2024-09-06 PROCEDURE — 250N000009 HC RX 250: Performed by: OBSTETRICS & GYNECOLOGY

## 2024-09-06 PROCEDURE — 250N000011 HC RX IP 250 OP 636: Performed by: NURSE ANESTHETIST, CERTIFIED REGISTERED

## 2024-09-06 PROCEDURE — 250N000009 HC RX 250: Performed by: NURSE ANESTHETIST, CERTIFIED REGISTERED

## 2024-09-06 PROCEDURE — 58558 HYSTEROSCOPY BIOPSY: CPT | Performed by: NURSE ANESTHETIST, CERTIFIED REGISTERED

## 2024-09-06 PROCEDURE — 360N000076 HC SURGERY LEVEL 3, PER MIN: Performed by: OBSTETRICS & GYNECOLOGY

## 2024-09-06 RX ORDER — OXYCODONE HYDROCHLORIDE 5 MG/1
5 TABLET ORAL
Status: DISCONTINUED | OUTPATIENT
Start: 2024-09-06 | End: 2024-09-06 | Stop reason: HOSPADM

## 2024-09-06 RX ORDER — PROPOFOL 10 MG/ML
INJECTION, EMULSION INTRAVENOUS PRN
Status: DISCONTINUED | OUTPATIENT
Start: 2024-09-06 | End: 2024-09-06

## 2024-09-06 RX ORDER — IBUPROFEN 200 MG
800 TABLET ORAL ONCE
Status: DISCONTINUED | OUTPATIENT
Start: 2024-09-06 | End: 2024-09-06 | Stop reason: HOSPADM

## 2024-09-06 RX ORDER — PROPOFOL 10 MG/ML
INJECTION, EMULSION INTRAVENOUS CONTINUOUS PRN
Status: DISCONTINUED | OUTPATIENT
Start: 2024-09-06 | End: 2024-09-06

## 2024-09-06 RX ORDER — FENTANYL CITRATE 50 UG/ML
25 INJECTION, SOLUTION INTRAMUSCULAR; INTRAVENOUS EVERY 5 MIN PRN
Status: DISCONTINUED | OUTPATIENT
Start: 2024-09-06 | End: 2024-09-06 | Stop reason: HOSPADM

## 2024-09-06 RX ORDER — ACETAMINOPHEN 325 MG/1
975 TABLET ORAL EVERY 6 HOURS PRN
COMMUNITY
Start: 2024-09-06

## 2024-09-06 RX ORDER — HALOPERIDOL 5 MG/ML
1 INJECTION INTRAMUSCULAR
Status: DISCONTINUED | OUTPATIENT
Start: 2024-09-06 | End: 2024-09-06 | Stop reason: HOSPADM

## 2024-09-06 RX ORDER — ONDANSETRON 2 MG/ML
INJECTION INTRAMUSCULAR; INTRAVENOUS PRN
Status: DISCONTINUED | OUTPATIENT
Start: 2024-09-06 | End: 2024-09-06

## 2024-09-06 RX ORDER — HYDROMORPHONE HYDROCHLORIDE 1 MG/ML
0.2 INJECTION, SOLUTION INTRAMUSCULAR; INTRAVENOUS; SUBCUTANEOUS EVERY 5 MIN PRN
Status: DISCONTINUED | OUTPATIENT
Start: 2024-09-06 | End: 2024-09-06 | Stop reason: HOSPADM

## 2024-09-06 RX ORDER — FENTANYL CITRATE 50 UG/ML
INJECTION, SOLUTION INTRAMUSCULAR; INTRAVENOUS PRN
Status: DISCONTINUED | OUTPATIENT
Start: 2024-09-06 | End: 2024-09-06

## 2024-09-06 RX ORDER — NALOXONE HYDROCHLORIDE 0.4 MG/ML
0.1 INJECTION, SOLUTION INTRAMUSCULAR; INTRAVENOUS; SUBCUTANEOUS
Status: DISCONTINUED | OUTPATIENT
Start: 2024-09-06 | End: 2024-09-06 | Stop reason: HOSPADM

## 2024-09-06 RX ORDER — IBUPROFEN 800 MG/1
800 TABLET, FILM COATED ORAL EVERY 6 HOURS PRN
COMMUNITY
Start: 2024-09-06

## 2024-09-06 RX ORDER — KETOROLAC TROMETHAMINE 30 MG/ML
INJECTION, SOLUTION INTRAMUSCULAR; INTRAVENOUS PRN
Status: DISCONTINUED | OUTPATIENT
Start: 2024-09-06 | End: 2024-09-06

## 2024-09-06 RX ORDER — HYDROMORPHONE HYDROCHLORIDE 1 MG/ML
0.4 INJECTION, SOLUTION INTRAMUSCULAR; INTRAVENOUS; SUBCUTANEOUS EVERY 5 MIN PRN
Status: DISCONTINUED | OUTPATIENT
Start: 2024-09-06 | End: 2024-09-06 | Stop reason: HOSPADM

## 2024-09-06 RX ORDER — DEXAMETHASONE SODIUM PHOSPHATE 4 MG/ML
INJECTION, SOLUTION INTRA-ARTICULAR; INTRALESIONAL; INTRAMUSCULAR; INTRAVENOUS; SOFT TISSUE PRN
Status: DISCONTINUED | OUTPATIENT
Start: 2024-09-06 | End: 2024-09-06

## 2024-09-06 RX ORDER — SODIUM CHLORIDE, SODIUM LACTATE, POTASSIUM CHLORIDE, CALCIUM CHLORIDE 600; 310; 30; 20 MG/100ML; MG/100ML; MG/100ML; MG/100ML
INJECTION, SOLUTION INTRAVENOUS CONTINUOUS PRN
Status: DISCONTINUED | OUTPATIENT
Start: 2024-09-06 | End: 2024-09-06

## 2024-09-06 RX ORDER — ACETAMINOPHEN 325 MG/1
975 TABLET ORAL ONCE
Status: DISCONTINUED | OUTPATIENT
Start: 2024-09-06 | End: 2024-09-06 | Stop reason: HOSPADM

## 2024-09-06 RX ORDER — FENTANYL CITRATE 50 UG/ML
50 INJECTION, SOLUTION INTRAMUSCULAR; INTRAVENOUS EVERY 5 MIN PRN
Status: DISCONTINUED | OUTPATIENT
Start: 2024-09-06 | End: 2024-09-06 | Stop reason: HOSPADM

## 2024-09-06 RX ORDER — ACETAMINOPHEN 325 MG/1
975 TABLET ORAL ONCE
Status: COMPLETED | OUTPATIENT
Start: 2024-09-06 | End: 2024-09-06

## 2024-09-06 RX ORDER — SODIUM CHLORIDE, SODIUM LACTATE, POTASSIUM CHLORIDE, CALCIUM CHLORIDE 600; 310; 30; 20 MG/100ML; MG/100ML; MG/100ML; MG/100ML
INJECTION, SOLUTION INTRAVENOUS CONTINUOUS
Status: DISCONTINUED | OUTPATIENT
Start: 2024-09-06 | End: 2024-09-06 | Stop reason: HOSPADM

## 2024-09-06 RX ORDER — EPHEDRINE SULFATE 50 MG/ML
INJECTION, SOLUTION INTRAMUSCULAR; INTRAVENOUS; SUBCUTANEOUS PRN
Status: DISCONTINUED | OUTPATIENT
Start: 2024-09-06 | End: 2024-09-06

## 2024-09-06 RX ORDER — LIDOCAINE HYDROCHLORIDE 20 MG/ML
INJECTION, SOLUTION INFILTRATION; PERINEURAL PRN
Status: DISCONTINUED | OUTPATIENT
Start: 2024-09-06 | End: 2024-09-06

## 2024-09-06 RX ORDER — FENTANYL CITRATE 50 UG/ML
25 INJECTION, SOLUTION INTRAMUSCULAR; INTRAVENOUS
Status: DISCONTINUED | OUTPATIENT
Start: 2024-09-06 | End: 2024-09-06 | Stop reason: HOSPADM

## 2024-09-06 RX ADMIN — PHENYLEPHRINE HYDROCHLORIDE 100 MCG: 10 INJECTION INTRAVENOUS at 08:00

## 2024-09-06 RX ADMIN — ACETAMINOPHEN 975 MG: 325 TABLET ORAL at 06:31

## 2024-09-06 RX ADMIN — SODIUM CHLORIDE, POTASSIUM CHLORIDE, SODIUM LACTATE AND CALCIUM CHLORIDE: 600; 310; 30; 20 INJECTION, SOLUTION INTRAVENOUS at 07:23

## 2024-09-06 RX ADMIN — KETOROLAC TROMETHAMINE 30 MG: 30 INJECTION, SOLUTION INTRAMUSCULAR at 08:19

## 2024-09-06 RX ADMIN — PROPOFOL 200 MCG/KG/MIN: 10 INJECTION, EMULSION INTRAVENOUS at 07:34

## 2024-09-06 RX ADMIN — PROPOFOL 40 MG: 10 INJECTION, EMULSION INTRAVENOUS at 07:33

## 2024-09-06 RX ADMIN — PHENYLEPHRINE HYDROCHLORIDE 100 MCG: 10 INJECTION INTRAVENOUS at 07:55

## 2024-09-06 RX ADMIN — DEXAMETHASONE SODIUM PHOSPHATE 6 MG: 4 INJECTION, SOLUTION INTRAMUSCULAR; INTRAVENOUS at 07:50

## 2024-09-06 RX ADMIN — PHENYLEPHRINE HYDROCHLORIDE 100 MCG: 10 INJECTION INTRAVENOUS at 07:58

## 2024-09-06 RX ADMIN — MIDAZOLAM 2 MG: 1 INJECTION INTRAMUSCULAR; INTRAVENOUS at 07:26

## 2024-09-06 RX ADMIN — ONDANSETRON 4 MG: 2 INJECTION INTRAMUSCULAR; INTRAVENOUS at 07:50

## 2024-09-06 RX ADMIN — EPHEDRINE SULFATE 10 MG: 5 INJECTION INTRAVENOUS at 08:08

## 2024-09-06 RX ADMIN — FENTANYL CITRATE 50 MCG: 50 INJECTION INTRAMUSCULAR; INTRAVENOUS at 07:38

## 2024-09-06 RX ADMIN — EPHEDRINE SULFATE 10 MG: 5 INJECTION INTRAVENOUS at 08:13

## 2024-09-06 RX ADMIN — LIDOCAINE HYDROCHLORIDE 80 MG: 20 INJECTION, SOLUTION INFILTRATION; PERINEURAL at 07:33

## 2024-09-06 ASSESSMENT — ACTIVITIES OF DAILY LIVING (ADL)
ADLS_ACUITY_SCORE: 29
ADLS_ACUITY_SCORE: 29
ADLS_ACUITY_SCORE: 27
ADLS_ACUITY_SCORE: 29

## 2024-09-06 NOTE — ANESTHESIA POSTPROCEDURE EVALUATION
Patient: Mira Luis    Procedure: Procedure(s):  HYSTEROSCOPY, WITH DILATION AND CURETTAGE OF UTERUS USING MORCELLATOR       Anesthesia Type:  MAC    Note:  Disposition: Outpatient   Postop Pain Control: Uneventful            Sign Out: Well controlled pain   PONV: No   Neuro/Psych: Uneventful            Sign Out: Acceptable/Baseline neuro status   Airway/Respiratory: Uneventful            Sign Out: Acceptable/Baseline resp. status   CV/Hemodynamics: Uneventful            Sign Out: Acceptable CV status; No obvious hypovolemia; No obvious fluid overload   Other NRE: NONE   DID A NON-ROUTINE EVENT OCCUR? No           Last vitals:  Vitals Value Taken Time   /89 09/06/24 0845   Temp 36.6  C (97.9  F) 09/06/24 0829   Pulse     Resp 18 09/06/24 0829   SpO2 93 % 09/06/24 0900   Vitals shown include unfiled device data.    Electronically Signed By: Sage Singh MD  September 6, 2024  9:00 AM

## 2024-09-06 NOTE — ANESTHESIA CARE TRANSFER NOTE
Patient: Mira Luis    Procedure: Procedure(s):  HYSTEROSCOPY, WITH DILATION AND CURETTAGE OF UTERUS USING MORCELLATOR       Diagnosis: Postmenopausal bleeding [N95.0]  Endometrial thickening on ultrasound [R93.89]  Diagnosis Additional Information: No value filed.    Anesthesia Type:   MAC     Note:    Oropharynx: oropharynx clear of all foreign objects  Level of Consciousness: awake  Oxygen Supplementation: room air    Independent Airway: airway patency satisfactory and stable  Dentition: dentition unchanged  Vital Signs Stable: post-procedure vital signs reviewed and stable  Report to RN Given: handoff report given  Patient transferred to: Phase II    Handoff Report: Identifed the Patient, Identified the Reponsible Provider, Reviewed the pertinent medical history, Discussed the surgical course, Reviewed Intra-OP anesthesia mangement and issues during anesthesia, Set expectations for post-procedure period and Allowed opportunity for questions and acknowledgement of understanding      Vitals:  Vitals Value Taken Time   /70 09/06/24 0829   Temp 36.6  C (97.9  F) 09/06/24 0829   Pulse     Resp 18 09/06/24 0829   SpO2 96 % 09/06/24 0830   Vitals shown include unfiled device data.    Electronically Signed By: LILIANA Godinez CRNA  September 6, 2024  8:31 AM

## 2024-09-06 NOTE — DISCHARGE INSTRUCTIONS
To contact a doctor, call Dr. Maria Elena Saavedra at [facility] [phone]  or:     [phone] and ask for the Resident On Call for:          Gynecology (answered 24 hours a day)   Emergency Departments:  [facility]: [phone]     [facility]'s Emergency Department: [phone]     You received a dose of IV Toradol at 08:20 am. Please do not take any additional Ibuprofen/NSAID products (Aleve/Advil/Motrin/Naproxen/Celebrex) until after 2:20pm.

## 2024-09-08 ENCOUNTER — HEALTH MAINTENANCE LETTER (OUTPATIENT)
Age: 54
End: 2024-09-08

## 2024-09-09 LAB
PATH REPORT.COMMENTS IMP SPEC: NORMAL
PATH REPORT.COMMENTS IMP SPEC: NORMAL
PATH REPORT.FINAL DX SPEC: NORMAL
PATH REPORT.GROSS SPEC: NORMAL
PATH REPORT.MICROSCOPIC SPEC OTHER STN: NORMAL
PATH REPORT.RELEVANT HX SPEC: NORMAL
PHOTO IMAGE: NORMAL

## 2024-09-10 ENCOUNTER — MYC MEDICAL ADVICE (OUTPATIENT)
Dept: OBGYN | Facility: CLINIC | Age: 54
End: 2024-09-10
Payer: COMMERCIAL

## 2024-09-27 ENCOUNTER — HOSPITAL ENCOUNTER (OUTPATIENT)
Dept: MAMMOGRAPHY | Facility: CLINIC | Age: 54
Discharge: HOME | End: 2024-09-27
Attending: NURSE PRACTITIONER | Admitting: NURSE PRACTITIONER
Payer: COMMERCIAL

## 2024-09-27 ENCOUNTER — ANCILLARY ORDERS (OUTPATIENT)
Dept: FAMILY MEDICINE | Facility: CLINIC | Age: 54
End: 2024-09-27

## 2024-09-27 DIAGNOSIS — Z12.31 VISIT FOR SCREENING MAMMOGRAM: ICD-10-CM

## 2024-09-27 DIAGNOSIS — Z12.31 VISIT FOR SCREENING MAMMOGRAM: Primary | ICD-10-CM

## 2024-09-27 PROCEDURE — 77063 BREAST TOMOSYNTHESIS BI: CPT

## 2025-01-05 ENCOUNTER — LAB (OUTPATIENT)
Dept: LAB | Facility: CLINIC | Age: 55
End: 2025-01-05
Payer: COMMERCIAL

## 2025-01-05 DIAGNOSIS — E66.9 OBESITY, UNSPECIFIED: ICD-10-CM

## 2025-01-05 DIAGNOSIS — Z87.440 PERSONAL HISTORY OF URINARY TRACT INFECTION: ICD-10-CM

## 2025-01-05 DIAGNOSIS — R73.01 IMPAIRED FASTING GLUCOSE: ICD-10-CM

## 2025-01-05 DIAGNOSIS — E78.6 LIPOPROTEIN DEFICIENCY: ICD-10-CM

## 2025-01-05 DIAGNOSIS — Z86.19 PERSONAL HISTORY OF OTHER INFECTIOUS AND PARASITIC DISEASES: ICD-10-CM

## 2025-01-05 DIAGNOSIS — N95.9 MENOPAUSAL AND PERIMENOPAUSAL DISORDER: Primary | ICD-10-CM

## 2025-01-05 LAB
ESTRADIOL SERPL-MCNC: 18 PG/ML
FASTING STATUS PATIENT QL REPORTED: YES
FSH SERPL IRP2-ACNC: 79.6 MIU/ML
GLUCOSE SERPL-MCNC: 120 MG/DL (ref 70–99)
PROGEST SERPL-MCNC: 1.8 NG/ML

## 2025-01-05 PROCEDURE — 82670 ASSAY OF TOTAL ESTRADIOL: CPT

## 2025-01-05 PROCEDURE — 83001 ASSAY OF GONADOTROPIN (FSH): CPT

## 2025-01-05 PROCEDURE — 84144 ASSAY OF PROGESTERONE: CPT

## 2025-01-05 PROCEDURE — 84403 ASSAY OF TOTAL TESTOSTERONE: CPT

## 2025-01-05 PROCEDURE — 36415 COLL VENOUS BLD VENIPUNCTURE: CPT

## 2025-01-05 PROCEDURE — 82947 ASSAY GLUCOSE BLOOD QUANT: CPT

## 2025-01-08 LAB — TESTOST SERPL-MCNC: 17 NG/DL (ref 8–60)

## 2025-01-20 ENCOUNTER — PATIENT OUTREACH (OUTPATIENT)
Dept: CARE COORDINATION | Facility: CLINIC | Age: 55
End: 2025-01-20
Payer: COMMERCIAL

## 2025-03-17 ENCOUNTER — TELEPHONE (OUTPATIENT)
Dept: ORTHOPEDICS | Facility: CLINIC | Age: 55
End: 2025-03-17
Payer: COMMERCIAL

## 2025-03-17 NOTE — TELEPHONE ENCOUNTER
I made an outbound phone call and left a brief voicemail for the patient.  Patient is scheduled for 3/24/2025 with Dr. Chua for a finger fracture according to appointment notes.  Care team reaching out in regards to obtaining records and information pertaining to this visit, set we are able to help her better for that visit.  Encouraged patient to call her team at [phone] or send us a [employer] message with more information.  No further action required.  Francois Brannon, ATC

## 2025-03-24 ENCOUNTER — ANCILLARY PROCEDURE (OUTPATIENT)
Dept: GENERAL RADIOLOGY | Facility: CLINIC | Age: 55
End: 2025-03-24
Attending: STUDENT IN AN ORGANIZED HEALTH CARE EDUCATION/TRAINING PROGRAM
Payer: COMMERCIAL

## 2025-03-24 ENCOUNTER — ANCILLARY ORDERS (OUTPATIENT)
Dept: RADIOLOGY | Facility: CLINIC | Age: 55
End: 2025-03-24

## 2025-03-24 ENCOUNTER — OFFICE VISIT (OUTPATIENT)
Dept: ORTHOPEDICS | Facility: CLINIC | Age: 55
End: 2025-03-24
Payer: COMMERCIAL

## 2025-03-24 DIAGNOSIS — S69.91XA INJURY OF FINGER, RIGHT, INITIAL ENCOUNTER: ICD-10-CM

## 2025-03-24 DIAGNOSIS — S69.91XA INJURY OF FINGER, RIGHT, INITIAL ENCOUNTER: Primary | ICD-10-CM

## 2025-03-24 PROCEDURE — 99204 OFFICE O/P NEW MOD 45 MIN: CPT | Performed by: STUDENT IN AN ORGANIZED HEALTH CARE EDUCATION/TRAINING PROGRAM

## 2025-03-24 PROCEDURE — 73140 X-RAY EXAM OF FINGER(S): CPT | Mod: TC | Performed by: INTERNAL MEDICINE

## 2025-03-24 NOTE — LETTER
3/24/2025      Mira Luis  [address] [place] [address]      Dear Colleague,    Thank you for referring your patient, Mira Luis, to the [facility]. Please see a copy of my visit note below.    ASSESSMENT & PLAN    Discussed diagnosis and treatment options with the patient today. A shared decision making model was used. The patient's values and choices were respected. The following represents what was discussed and decided upon by the provider and the patient.     Mira was seen today for fracture.    Diagnoses and all orders for this visit:    Injury of finger, right, initial encounter  -     XR Finger Right G/E 2 Views; Future        Avulsion fracture of DIP s/p 3 weeks of immobilization. Has extension and flexion of DIP, PIP and MCP although stiffness as expected from immobilization. Discussed due to large nature of avulsion and ow three weeks out from injury may never have complete healing and may always have nonunion. Can consider if no pain relief or problems in the future having bony fragment removed however reassuring that has no pain today in office at site of fracture.   Plan as below:   - protection with splint or tyson tape for three weeks  - Hand therapy referral    Estelle Chua DO  [facility]    -----  Chief Complaint   Patient presents with     Right Hand - Fracture       SUBJECTIVE  Mira Luis is a/an 54 year old female who is seen as a self referral for evaluation of righ ring finger pain.     The patient is seen by themselves.  The patient is Right handed    Onset: 3 weeks ago, March 2nd 2025. Patient describes injury as climbing a ladder, trippeda the top, fell on the finger  Location of Pain: No pain recently but previously  Worsened by: nothing  Better with: splint, ibuprofen TID  Treatments tried: finger splint, ibuprofen TID.   Associated symptoms: no distal numbness or  tingling; denies swelling or warmth.   Denies numbness, tingling, locking  Orthopedic/Surgical history: NO  Social History/Occupation: retired.     Patient Active Problem List   Diagnosis     Uterine fibroid     Fatigue     Obesity (BMI 30-39.9)     High vitamin D level     Elevated bilirubin     Impaired fasting glucose     Menopausal syndrome (hot flashes)     Hallux rigidus of left foot     Arthritis of first metatarsophalangeal (MTP) joint of left foot     Pain in joint of left foot     Vaccine reaction-flu 2013       Current Outpatient Medications   Medication Sig Dispense Refill     acetaminophen (TYLENOL) 325 MG tablet Take 3 tablets (975 mg) by mouth every 6 hours as needed for mild pain.       BIESTROGEN, 20-80, 1.25 MG COMPOUND Apply 2 mg topically every evening. 50/50 2MG/GRAM APPLY 2 PUMPS TOPICALLY ONCE A DAY       COMPOUNDED NON-CONTROLLED SUBSTANCE (CMPD RX) - PHARMACY TO MIX COMPOUNDED MEDICATION BI-EST 50:50 2mg/g- apply 2 clicks once a day 90 g 3     COMPOUNDED NON-CONTROLLED SUBSTANCE (CMPD RX) - PHARMACY TO MIX COMPOUNDED MEDICATION Progesterone DR  150 mg capsule- take one at HS (Patient taking differently: Take 1 capsule by mouth every evening. Progesterone DR  150 mg capsule- take one at HS) 90 capsule 3     ibuprofen (ADVIL/MOTRIN) 800 MG tablet Take 1 tablet (800 mg) by mouth every 6 hours as needed for other (mild and/or inflammatory pain).       MAGNESIUM GLYCINATE PO Take 1 capsule by mouth every morning. MAGNESIUM THREONATE       Milk Thistle-Dand-Fennel-Licor (MILK THISTLE XTRA) CAPS capsule Take 1 capsule by mouth every evening.       Multiple Vitamin (ONE-A-DAY ESSENTIAL) TABS Take 1 tablet by mouth every morning.       progesterone (PROMETRIUM) 100 MG capsule Take 150 mg by mouth At Bedtime       S-Adenosylmethionine (GIANNI-E PO) Take 1 capsule by mouth daily.         PMH, Medications and Allergies were reviewed and updated as needed.    REVIEW OF SYSTEMS:  10 point ROS is negative  other than symptoms noted above in HPI        OBJECTIVE:  LMP 07/14/2020    General: healthy, alert and in no distress  Skin: no suspicious lesions or rash.  CV: distal perfusion intact RU  Resp: normal respiratory effort without conversational dyspnea   Psych: normal mood and affect  Gait: NORMAL  Neuro: Normal light sensory exam of RU extremity    RIGHT HAND  Inspection:  Mild swelling of right ring DIP  Palpation:  Nontender at site of fracture right ring DIP  Range of Motion:  Able to flex and extend DIP of right ring but limited due to stiffness   strength diminished due to finger injury        RADIOLOGY:  Final results and radiologist's interpretation, available in the [facility] health record.  Images were reviewed with the patient in the office today.    My personal interpretation of the performed imaging:     XR finger 3 views: Right 3/24/25  Moderately displaced Fracture of ring DIP most notable lateral  Await final radiology     XR finger 3 views: Right 3/24/25  Moderately displaced Fracture of ring DIP               Greater than 45 minutes spent by me on the date of the encounter doing chart review, review of outside records, review of test results, interpretation of tests, patient visit, and documentation . If procedure performed, this was separate from time with procedure.       Disclaimer: This note consists of symbols derived from keyboarding, dictation and/or voice recognition software. As a result, there may be errors in the script that have gone undetected. Please consider this when interpreting information found in this chart.       Again, thank you for allowing me to participate in the care of your patient.        Sincerely,        Estelle Chua, DO    Electronically signed

## 2025-03-24 NOTE — PROGRESS NOTES
ASSESSMENT & PLAN    Discussed diagnosis and treatment options with the patient today. A shared decision making model was used. The patient's values and choices were respected. The following represents what was discussed and decided upon by the provider and the patient.     Mira was seen today for fracture.    Diagnoses and all orders for this visit:    Injury of finger, right, initial encounter  -     XR Finger Right G/E 2 Views; Future        Avulsion fracture of DIP s/p 3 weeks of immobilization. Has extension and flexion of DIP, PIP and MCP although stiffness as expected from immobilization. Discussed due to large nature of avulsion and ow three weeks out from injury may never have complete healing and may always have nonunion. Can consider if no pain relief or problems in the future having bony fragment removed however reassuring that has no pain today in office at site of fracture.   Plan as below:   - protection with splint or tyson tape for three weeks  - Hand therapy referral    [facility]    -----  Chief Complaint   Patient presents with    Right Hand - Fracture       SUBJECTIVE  Mira Luis is a/an 54 year old female who is seen as a self referral for evaluation of righ ring finger pain.     The patient is seen by themselves.  The patient is Right handed    Onset: 3 weeks ago, March 2nd 2025. Patient describes injury as climbing a ladder, trippeda the top, fell on the finger  Location of Pain: No pain recently but previously  Worsened by: nothing  Better with: splint, ibuprofen TID  Treatments tried: finger splint, ibuprofen TID.   Associated symptoms: no distal numbness or tingling; denies swelling or warmth.   Denies numbness, tingling, locking  Orthopedic/Surgical history: NO  Social History/Occupation: retired.     Patient Active Problem List   Diagnosis    Uterine fibroid    Fatigue    Obesity (BMI 30-39.9)    High vitamin D level     Elevated bilirubin    Impaired fasting glucose    Menopausal syndrome (hot flashes)    Hallux rigidus of left foot    Arthritis of first metatarsophalangeal (MTP) joint of left foot    Pain in joint of left foot    Vaccine reaction-flu 2013       Current Outpatient Medications   Medication Sig Dispense Refill    acetaminophen (TYLENOL) 325 MG tablet Take 3 tablets (975 mg) by mouth every 6 hours as needed for mild pain.      BIESTROGEN, 20-80, 1.25 MG COMPOUND Apply 2 mg topically every evening. 50/50 2MG/GRAM APPLY 2 PUMPS TOPICALLY ONCE A DAY      COMPOUNDED NON-CONTROLLED SUBSTANCE (CMPD RX) - PHARMACY TO MIX COMPOUNDED MEDICATION BI-EST 50:50 2mg/g- apply 2 clicks once a day 90 g 3    COMPOUNDED NON-CONTROLLED SUBSTANCE (CMPD RX) - PHARMACY TO MIX COMPOUNDED MEDICATION Progesterone DR  150 mg capsule- take one at HS (Patient taking differently: Take 1 capsule by mouth every evening. Progesterone DR  150 mg capsule- take one at HS) 90 capsule 3    ibuprofen (ADVIL/MOTRIN) 800 MG tablet Take 1 tablet (800 mg) by mouth every 6 hours as needed for other (mild and/or inflammatory pain).      MAGNESIUM GLYCINATE PO Take 1 capsule by mouth every morning. MAGNESIUM THREONATE      Milk Thistle-Dand-Fennel-Licor (MILK THISTLE XTRA) CAPS capsule Take 1 capsule by mouth every evening.      Multiple Vitamin (ONE-A-DAY ESSENTIAL) TABS Take 1 tablet by mouth every morning.      progesterone (PROMETRIUM) 100 MG capsule Take 150 mg by mouth At Bedtime      S-Adenosylmethionine (GIANNI-E PO) Take 1 capsule by mouth daily.         PMH, Medications and Allergies were reviewed and updated as needed.    REVIEW OF SYSTEMS:  10 point ROS is negative other than symptoms noted above in HPI        OBJECTIVE:  LMP 07/14/2020    General: healthy, alert and in no distress  Skin: no suspicious lesions or rash.  CV: distal perfusion intact RU  Resp: normal respiratory effort without conversational dyspnea   Psych: normal mood and affect  Gait:  NORMAL  Neuro: Normal light sensory exam of RU extremity    RIGHT HAND  Inspection:  Mild swelling of right ring DIP  Palpation:  Nontender at site of fracture right ring DIP  Range of Motion:  Able to flex and extend DIP of right ring but limited due to stiffness   strength diminished due to finger injury        RADIOLOGY:  Final results and radiologist's interpretation, available in the [facility] health record.  Images were reviewed with the patient in the office today.    My personal interpretation of the performed imaging:     XR finger 3 views: Right 3/24/25  Moderately displaced Fracture of ring DIP most notable lateral  Await final radiology     XR finger 3 views: Right 3/24/25  Moderately displaced Fracture of ring DIP               Greater than 45 minutes spent by me on the date of the encounter doing chart review, review of outside records, review of test results, interpretation of tests, patient visit, and documentation . If procedure performed, this was separate from time with procedure.       Disclaimer: This note consists of symbols derived from keyboarding, dictation and/or voice recognition software. As a result, there may be errors in the script that have gone undetected. Please consider this when interpreting information found in this chart.

## 2025-03-24 NOTE — PATIENT INSTRUCTIONS
1. Injury of finger, right, initial encounter      Avulsion fracture to ring finger  - protection with splint or tyson tape for three weeks  - Hand therapy referral    Please call [phone]  Ask for my team if you have any questions or concerns  Estelle Chua DO  [facility]  Thank you for choosing [facility]!    CLINIC LOCATIONS:     [facility] LINE: [phone]   [address] [place] [address] APPOINTMENTS: [phone]   [place] [address] RADIOLOGY: [phone]   (Monday, Thursday & Friday) PHYSICAL THERAPY: [phone]    BILLING QUESTIONS: [phone]   [facility] FAX: [phone] [address]    [place] [address]    (Wednesday)

## 2025-04-10 ENCOUNTER — THERAPY VISIT (OUTPATIENT)
Dept: OCCUPATIONAL THERAPY | Facility: CLINIC | Age: 55
End: 2025-04-10
Payer: COMMERCIAL

## 2025-04-10 DIAGNOSIS — S69.91XA INJURY OF FINGER, RIGHT, INITIAL ENCOUNTER: ICD-10-CM

## 2025-04-10 NOTE — PROGRESS NOTES
OCCUPATIONAL THERAPY EVALUATION  Type of Visit: Evaluation        Fall Risk Screen:  Fall screen completed by: OT  Have you fallen 2 or more times in the past year?: No  Have you fallen and had an injury in the past year?: Yes  Is patient a fall risk?: Department fall risk interventions implemented    Subjective        Presenting condition or subjective complaint: Dominant hand ring finger fractures above & below first joint  Date of onset: 03/24/25 (Referral)    Relevant medical history:     Dates & types of surgery: Left big toe cheilectomy Oct2021    Patient presents to therapy today for evaluation and treatment following injury to her right ring finger.  She reports that her and her partner are building container home in [facility], fell from a ladder striking the finger. DOI 3/2/2025, not immobilized in [place] until 3/24/2025. Since, has been bracing nearly full-time, having the [place] off, no movement other than PIP blocking 1-2 hours per day.  She endorses ongoing swelling, stiffness to the DIP joint, pain with movement as well as tenderness to palpation.    Per Dr. Chua 3/24/2025:     Avulsion fracture of DIP s/p 3 weeks of immobilization. Has extension and flexion of DIP, PIP and MCP although stiffness as expected from immobilization. Discussed due to large nature of avulsion and ow three weeks out from injury may never have complete healing and may always have nonunion. Can consider if no pain relief or problems in the future having bony fragment removed however reassuring that has no pain today in office at site of fracture.   Plan as below:   - protection with splint or tyson tape for three weeks    - Today approximately 5 weeks post initial immobilization.     Prior diagnostic imaging/testing results: X-ray     Prior therapy history for the same diagnosis, illness or injury: No      Prior Level of Function  Transfers: Independent  Ambulation: Independent  ADL: Independent  IADL: Driving, Finances,  Housekeeping, Laundry, Meal preparation, Medication management, Yard work    Living Environment  Social support: With a significant other or spouse   Type of home: House   Stairs to enter the home: Yes 2 Is there a railing: Yes     Ramp: No   Stairs inside the home: Yes 12 Is there a railing: Yes     Help at home: None  Equipment owned:       Employment: No    Hobbies/Interests: [employer]    Patient goals for therapy: Write, type, use my hand       Objective   ADDITIONAL HISTORY:  Right hand dominant  Patient reports symptoms of pain, stiffness/loss of motion, weakness/loss of strength, and edema  Transportation: drives  Currently retired.     Functional Outcome Measure:   Upper Extremity Functional Index Score:  SCORE:   Column Totals: /80: (Patient-Rptd) 30   (A lower score indicates greater disability.)    PAIN:  Pain Level at Rest: 2/10  Pain Level with Use: 6/10  Pain Location: R RF DIP and distal phalanx  Pain Quality: Aching  Pain Frequency: constant  Pain is Worst: daytime  Pain is Exacerbated By: AROM, forceful gripping.  Pain is Relieved By: rest and immobilization.  Pain Progression: Unchanged    POSTURE: Normal     EDEMA:  There is mild, healing ecchymosis and edema to the R RF P3, DIP and P2. Otherwise negative.       SENSATION: WNL throughout all nerve distributions; per patient report     ROM:   Hand ROM  Left AROM Right AROM    Index MP     PIP     DIP     Total Active Motion     Long MP     PIP     DIP     Total Active Motion     RING MP 0/87 0/87   PIP HE/92 HE/79   DIP 0/70 -7/7, evaluation limited by edema and perhaps dorsal prominence of the fracture fragment   Total Active Motion     Small MP     PIP     DIP     Total Active Motion       RESISTED TESTING: Gentle assessment of active right ring finger DIP extension appears that the terminal tendon is intact, though perhaps weak or attenuated.  Flexion is preserved though not assessed further or forcefully.    STRENGTH:  Contraindicated    PALPATION:  Tender to palpation right ring finger P3, P2, and DIP.  There are Heberden's nodules notable to several digits bilaterally.     Assessment & Plan   CLINICAL IMPRESSIONS  Medical Diagnosis: Injury of finger (right)    Treatment Diagnosis: Injury of finger (right)    Impression/Assessment: Pt is a 54 year old female presenting to Occupational Therapy due to injury of right ring finger.  The following significant findings have been identified: Impaired activity tolerance, Impaired coordination, Impaired ROM, Impaired strength, and Pain.  These identified deficits interfere with their ability to perform self care tasks, recreational activities, household chores, driving , medication management, financial management,  yard work, and meal planning and preparation as compared to previous level of function.   Patient's limitations or Problem List includes: Pain, Decreased ROM/motion, Increased edema, Weakness, Decreased stability, Hypermobility, Hypomobility, Decreased , Decreased pinch, Decreased coordination, Decreased dexterity, and Tightness in musculature of the right index finger, long finger, ring finger, and small finger which interferes with the patient's ability to perform Self Care Tasks (dressing, eating, bathing, hygiene/toileting), Sleep Patterns, Recreational Activities, Household Chores, and Driving  as compared to previous level of function.    Clinical Decision Making (Complexity):  Assessment of Occupational Performance: 3-5 Performance Deficits  Occupational Performance Limitations: bathing/showering, toileting, dressing, feeding, hygiene and grooming, care of others, communication management, driving and community mobility, health management and maintenance, home establishment and management, meal preparation and cleanup, shopping, sleep, leisure activities, and social participation  Clinical Decision Making (Complexity): Low complexity    PLAN OF CARE  Treatment  Interventions:  Therapeutic Exercise:  AROM, AAROM, PROM, Tendon Gliding, Blocking, Reverse Blocking, Place and Hold, Contract Relax, Extensor Tracking, Isotonics, Isometrics, and Stabilization  Neuromuscular re-education:  Nerve Gliding, Coordination/Dexterity, Kinesthetic Training, Proprioceptive Training, Posture, Kinesiotaping, Strain Counter Strain, Isometrics, and Stabilization  Manual Techniques:  Coordination/Dexterity, Joint mobilization, Friction massage, Myofascial release, and Manual edema mobilization  Orthotic Fabrication:  Static, Static progressive, Dynamic, Finger based, Hand based, and Forearm based  Self Care:  Self Care Tasks, Ergonomic Considerations, and Work Tasks    Long Term Goals   OT Goal 1  Goal Identifier: Goal I  Goal Description: Patient will exhibit AROM of the right ring finger DIP -5/20 without pain or difficulty.  Rationale: In order to maximize safety and independence with performance of self-care activities;In order to maximize safety and independence with ADL/IADLs  Goal Progress: New  Target Date: 06/05/25      Frequency of Treatment: 1x/week  Duration of Treatment: 8 weeks     Education Assessment: Learner/Method: Patient;No Barriers to Learning;Pictures/Video;Demonstration;Reading;Listening     Risks and benefits of evaluation/treatment have been explained.   Patient/Family/caregiver agrees with Plan of Care.     Evaluation Time:    OT Eval, Low Complexity Minutes (22411): 15    Signing Clinician: Scooter Wahl OT

## 2025-04-16 ENCOUNTER — THERAPY VISIT (OUTPATIENT)
Dept: OCCUPATIONAL THERAPY | Facility: CLINIC | Age: 55
End: 2025-04-16
Payer: COMMERCIAL

## 2025-04-16 DIAGNOSIS — S69.91XA INJURY OF FINGER, RIGHT, INITIAL ENCOUNTER: Primary | ICD-10-CM

## 2025-04-16 PROCEDURE — 97110 THERAPEUTIC EXERCISES: CPT | Mod: GO | Performed by: OCCUPATIONAL THERAPIST

## 2025-04-16 PROCEDURE — 97763 ORTHC/PROSTC MGMT SBSQ ENC: CPT | Mod: GO | Performed by: OCCUPATIONAL THERAPIST

## 2025-04-29 ENCOUNTER — ANCILLARY PROCEDURE (OUTPATIENT)
Dept: MRI IMAGING | Facility: CLINIC | Age: 55
End: 2025-04-29
Attending: NURSE PRACTITIONER
Payer: COMMERCIAL

## 2025-04-29 DIAGNOSIS — H57.11 EYE PAIN, RIGHT: ICD-10-CM

## 2025-04-29 DIAGNOSIS — R93.89 ABNORMAL MRI: Primary | ICD-10-CM

## 2025-04-29 PROCEDURE — A9585 GADOBUTROL INJECTION: HCPCS | Mod: JW | Performed by: RADIOLOGY

## 2025-04-29 PROCEDURE — 70553 MRI BRAIN STEM W/O & W/DYE: CPT | Mod: GC | Performed by: RADIOLOGY

## 2025-04-29 PROCEDURE — 70543 MRI ORBT/FAC/NCK W/O &W/DYE: CPT | Mod: GC | Performed by: RADIOLOGY

## 2025-04-29 RX ORDER — GADOBUTROL 604.72 MG/ML
10 INJECTION INTRAVENOUS ONCE
Status: COMPLETED | OUTPATIENT
Start: 2025-04-29 | End: 2025-04-29

## 2025-04-29 RX ADMIN — GADOBUTROL 9.5 ML: 604.72 INJECTION INTRAVENOUS at 14:25

## 2025-04-29 NOTE — DISCHARGE INSTRUCTIONS
MRI Contrast Discharge Instructions    The IV contrast you received today will pass out of your body in your  urine. This will happen in the next 24 hours. You will not feel this process.  Your urine will not change color.    Drink at least 4 extra glasses of water or juice today (unless your doctor  has restricted your fluids). This reduces the stress on your kidneys.  You may take your regular medicines.    If you are on dialysis: It is best to have dialysis today.    If you have a reaction: Most reactions happen right away. If you have  any new symptoms after leaving the hospital (such as hives or swelling),  call your hospital at the correct number below. Or call your family doctor.  If you have breathing distress or wheezing, call 911.    Special instructions: ***    I have read and understand the above information.    Signature:______________________________________ Date:___________    Staff:__________________________________________ Date:___________     Time:__________    [facility] Departments:    ___[place]: [phone]  ___[facility]: [phone]  ___[place]: [phone] ___[facility]: [phone]  ___[facility]: [phone]  ___[facility]: [phone]  ___[phone]  ___[facility]: [phone]  ___[phone]

## 2025-04-30 ENCOUNTER — TELEPHONE (OUTPATIENT)
Dept: FAMILY MEDICINE | Facility: CLINIC | Age: 55
End: 2025-04-30
Payer: COMMERCIAL

## 2025-04-30 NOTE — TELEPHONE ENCOUNTER
Call placed to Patient and she was given the information regarding the scan  findings. Patient voiced she was already notified and that the follow up testing was  Scheduled. Patient had no questions at this time.  Shayla Ocampo LPN on 4/30/2025 at 5:21 PM

## 2025-04-30 NOTE — TELEPHONE ENCOUNTER
----- Message from Tia Spring sent at 4/29/2025  4:53 PM CDT -----  Please call patient- possible pituitary lesion- focused pituitary MRI recommended for further evaluation.  Order placed.

## 2025-05-03 ENCOUNTER — MYC MEDICAL ADVICE (OUTPATIENT)
Dept: FAMILY MEDICINE | Facility: CLINIC | Age: 55
End: 2025-05-03
Payer: COMMERCIAL

## 2025-05-03 DIAGNOSIS — R93.0 ABNORMAL MRI OF HEAD: Primary | ICD-10-CM

## 2025-05-06 NOTE — TELEPHONE ENCOUNTER
Spoke with patient- she would like to speak with a radiologist or neurosurgeon before proceeding with parotid MRI.  I attempted to answer her questions but she specifically wants to discuss this further with a specialist.

## 2025-05-07 NOTE — TELEPHONE ENCOUNTER
Call placed to Patient I explained the referral was sent to the radiologist  on 05/06/2025 and she needs to be patient and they should give her a call to schedule appointment. Patient is very concerned as she does not understand the result that were given to her and now need a brain scan. Patient asking for someone to please explain to her what exactly are they looking for?  Patient agreed to wait for a radiologist to call and get more information at that time. Shayla Ocampo LPN on 5/7/2025 at 6:08 PM

## 2025-05-08 ENCOUNTER — THERAPY VISIT (OUTPATIENT)
Dept: OCCUPATIONAL THERAPY | Facility: CLINIC | Age: 55
End: 2025-05-08
Payer: COMMERCIAL

## 2025-05-08 DIAGNOSIS — S69.91XA INJURY OF FINGER, RIGHT, INITIAL ENCOUNTER: Primary | ICD-10-CM

## 2025-05-09 ENCOUNTER — RESULTS FOLLOW-UP (OUTPATIENT)
Dept: FAMILY MEDICINE | Facility: CLINIC | Age: 55
End: 2025-05-09

## 2025-05-13 ENCOUNTER — TELEPHONE (OUTPATIENT)
Dept: NEUROSURGERY | Facility: CLINIC | Age: 55
End: 2025-05-13
Payer: COMMERCIAL

## 2025-05-13 NOTE — TELEPHONE ENCOUNTER
Left Voicemail (2nd Attempt) sent MYC (1st attempt) for the patient to call back and schedule the following:     Appointment type: New Tumor  Provider: YORDY - See scheduling guidelines   Return date: First available  Specialty phone number: [phone]  Additional appointment(s) needed: Labs prior  Additonal Notes: Schedule with YORDY who sees pituitary adenoma. Patient should complete prolactin lab (ordered by referring provider) at least 2-3 days prior to appointment. Thanks.

## 2025-05-20 ENCOUNTER — THERAPY VISIT (OUTPATIENT)
Dept: OCCUPATIONAL THERAPY | Facility: CLINIC | Age: 55
End: 2025-05-20
Payer: COMMERCIAL

## 2025-05-20 DIAGNOSIS — S69.91XA INJURY OF FINGER, RIGHT, INITIAL ENCOUNTER: Primary | ICD-10-CM

## 2025-05-20 PROCEDURE — 97110 THERAPEUTIC EXERCISES: CPT | Mod: GO | Performed by: OCCUPATIONAL THERAPIST

## 2025-05-20 PROCEDURE — 97530 THERAPEUTIC ACTIVITIES: CPT | Mod: GO | Performed by: OCCUPATIONAL THERAPIST

## 2025-05-21 NOTE — TELEPHONE ENCOUNTER
RECORDS RECEIVED FROM: Internal    REASON FOR VISIT: Abnormal MRI of head   PROVIDER: Sadaf Ervin PA-C   DATE OF APPT: 6/25/25 @ 8:30 am    NOTES (FOR ALL VISITS) STATUS DETAILS   OFFICE NOTE from referring provider Internal 5/9/25 (Results follow-up), 3/28/25 Tia Spring, CNP @[facility]     MEDICATION LIST Internal    IMAGING  (FOR ALL VISITS)     MRI (HEAD, NECK, SPINE) Internal [facility]  5/8/25 MR Pituitary  4/29/25 MR Brain & Orbits

## 2025-05-26 ENCOUNTER — RESULTS FOLLOW-UP (OUTPATIENT)
Dept: FAMILY MEDICINE | Facility: CLINIC | Age: 55
End: 2025-05-26

## 2025-06-25 ENCOUNTER — OFFICE VISIT (OUTPATIENT)
Dept: NEUROSURGERY | Facility: CLINIC | Age: 55
End: 2025-06-25
Attending: NURSE PRACTITIONER
Payer: COMMERCIAL

## 2025-06-25 ENCOUNTER — PRE VISIT (OUTPATIENT)
Dept: NEUROSURGERY | Facility: CLINIC | Age: 55
End: 2025-06-25

## 2025-06-25 VITALS
OXYGEN SATURATION: 98 % | SYSTOLIC BLOOD PRESSURE: 117 MMHG | BODY MASS INDEX: 33.91 KG/M2 | HEART RATE: 74 BPM | WEIGHT: 211 LBS | DIASTOLIC BLOOD PRESSURE: 79 MMHG | RESPIRATION RATE: 16 BRPM | HEIGHT: 66 IN

## 2025-06-25 DIAGNOSIS — D35.3 BENIGN NEOPLASM OF PITUITARY GLAND AND CRANIOPHARYNGEAL DUCT (POUCH) (H): Primary | ICD-10-CM

## 2025-06-25 DIAGNOSIS — R93.0 ABNORMAL MRI OF HEAD: ICD-10-CM

## 2025-06-25 DIAGNOSIS — D35.2 BENIGN NEOPLASM OF PITUITARY GLAND AND CRANIOPHARYNGEAL DUCT (POUCH) (H): Primary | ICD-10-CM

## 2025-06-25 RX ORDER — ESTRADIOL 0.05 MG/D
PATCH, EXTENDED RELEASE TRANSDERMAL
COMMUNITY
Start: 2025-06-04

## 2025-06-25 ASSESSMENT — PAIN SCALES - GENERAL: PAINLEVEL_OUTOF10: NO PAIN (0)

## 2025-06-25 NOTE — LETTER
2025       RE: Mira Luis  [address] [place] [address]     Dear Colleague,    Thank you for referring your patient, Mira Luis, to the [facility]UROS[facility] [place] at [facility]. Please see a copy of my visit note below.      [facility]  Department of Neurosurgery  Center for Skull Base and Pituitary Surgery    Name: Mira Luis  MRN: [id number]  Age: 54 year old  : 1970  Referring provider: Tia Spring, DONN  2025      Chief Complaint:   Pituitary lesion, new patient consult    History of Present Illness:   Mira Luis is a 54 year old right handed female who is here today as a new patient regarding a pituitary lesion, found incidentally in May 2025 upon workup for a strange sensation of tension behind her right eye. This occurred last year as well and she was worked up and ultimately diagnosed with sinusitis. More recently when it recurred she met with her PCP who ordered brain imaging. This identified a subcentimeter hypoenhancement in the left inferior sella. She's here today unaccompanied. She feels well and has not had eye symptoms over the past month. She does have a history of headaches as a younger person, does not currently. She denies new blurry or double vision.     Of note, she follows with a Functional provider outside of [facility] and has been diagnosed with chronic Lyme disease in the past.     , no children. Nonsmoker.  Retired, did Tech consulting.    Review of Systems:   Pertinent items are noted in HPI or as in patient entered ROS below, remainder of complete ROS is negative.     Active Medications:     Current Outpatient Medications:      COMPOUNDED NON-CONTROLLED SUBSTANCE (CMPD RX) - PHARMACY TO MIX COMPOUNDED MEDICATION, Progesterone DR  150 mg capsule- take one at HS, Disp: 90 capsule, Rfl: 3     estradiol (VIVELLE-DOT) 0.05 MG/24HR bi-weekly patch, APPLY 1 PATCH TO  CLEAN, DRY SKIN TWICE WEEKLY, Disp: , Rfl:      MAGNESIUM GLYCINATE PO, Take 1 capsule by mouth every morning. MAGNESIUM THREONATE, Disp: , Rfl:      Milk Thistle-Dand-Fennel-Licor (MILK THISTLE XTRA) CAPS capsule, Take 1 capsule by mouth every evening., Disp: , Rfl:      Multiple Vitamin (ONE-A-DAY ESSENTIAL) TABS, Take 1 tablet by mouth every morning., Disp: , Rfl:      S-Adenosylmethionine (GIANNI-E PO), Take 1 capsule by mouth daily., Disp: , Rfl:      acetaminophen (TYLENOL) 325 MG tablet, Take 3 tablets (975 mg) by mouth every 6 hours as needed for mild pain., Disp: , Rfl:      BIESTROGEN, 20-80, 1.25 MG COMPOUND, Apply 2 mg topically every evening. 50/50 2MG/GRAM APPLY 2 PUMPS TOPICALLY ONCE A DAY, Disp: , Rfl:      COMPOUNDED NON-CONTROLLED SUBSTANCE (CMPD RX) - PHARMACY TO MIX COMPOUNDED MEDICATION, BI-EST 50:50 2mg/g- apply 2 clicks once a day, Disp: 90 g, Rfl: 3     ibuprofen (ADVIL/MOTRIN) 800 MG tablet, Take 1 tablet (800 mg) by mouth every 6 hours as needed for other (mild and/or inflammatory pain)., Disp: , Rfl:      progesterone (PROMETRIUM) 100 MG capsule, Take 150 mg by mouth At Bedtime, Disp: , Rfl:       Allergies:   Shellfish allergy and Sulfa antibiotics      Past Medical History:  Past Medical History:   Diagnosis Date     AGW (anogenital warts)      Arthritis of first metatarsophalangeal (MTP) joint of left foot 07/30/2021     ASCUS (atypical squamous cells of undetermined significance) on Pap smear 11/11/2013    neg HPV. plan to repeat pap/HPV in 1 year     CKD (chronic kidney disease)      Headache      Heart palpitations 10/1998     LSIL (low grade squamous intraepithelial lesion) on Pap smear 10/12/11, 6/2012 11/11/11 colp - LSIL     Lyme disease      Migraines Age 20's    Cluster     Obesity      PONV (postoperative nausea and vomiting)      Postmenopausal bleeding      Reticulocytosis      Patient Active Problem List   Diagnosis     Uterine fibroid     Fatigue     Obesity (BMI 30-39.9)      High vitamin D level     Elevated bilirubin     Impaired fasting glucose     Menopausal syndrome (hot flashes)     Hallux rigidus of left foot     Arthritis of first metatarsophalangeal (MTP) joint of left foot     Pain in joint of left foot     Vaccine reaction-flu 2013     Injury of finger, right, initial encounter        Past Surgical History:  Past Surgical History:   Procedure Laterality Date     CHEILECTOMY Left 10/19/2021    Procedure: LEFT FOOT CHEILECTOMY;  Surgeon: Arturo Harris DPM;  Location: [facility] OR     COLONOSCOPY N/A 8/19/2021    Procedure: COLONOSCOPY;  Surgeon: Corwin Remy MD;  Location: [facility] GI     DILATION AND CURETTAGE, OPERATIVE HYSTEROSCOPY WITH MORCELLATOR, COMBINED N/A 9/6/2024    Procedure: HYSTEROSCOPY, WITH DILATION AND CURETTAGE OF UTERUS USING MORCELLATOR;  Surgeon: Maria Elena Saavedra MD;  Location: [facility] OR     EXCISE VULVA WIDE LOCAL  1/16/2013    Procedure: EXCISE VULVA WIDE LOCAL;  Vaginal wide local excision of the vulva.   Diagnosis: vulva chondyloma.;  Surgeon: Pretty Collado MD;  Location: [facility] OR     EXCISE VULVA WIDE LOCAL       SURGICAL HISTORY OF -   2/2008    Fibula Fx       Family History:   Family History   Problem Relation Age of Onset     Ulcerative Colitis Mother      Arrhythmia Mother         s/p ablation     Hypotension Father      Hypertension Maternal Grandmother      Cancer Maternal Aunt      Cancer Paternal Aunt      Diabetes No family hx of      Cerebrovascular Disease No family hx of      Thyroid Disease No family hx of      Glaucoma No family hx of      Macular Degeneration No family hx of      Colon Cancer No family hx of      Anesthesia Reaction No family hx of      Deep Vein Thrombosis (DVT) No family hx of          Social History:   Social History     Tobacco Use     Smoking status: Never     Passive exposure: Never     Smokeless tobacco: Never   Vaping Use     Vaping status: Never Used   Substance Use Topics     Alcohol use: Not Currently      "Drug use: No        Physical Exam:   /79 (BP Location: Right arm, Patient Position: Sitting)   Pulse 74   Resp 16   Ht 1.676 m (5' 6\")   Wt 95.7 kg (211 lb)   LMP 07/14/2020   SpO2 98%   BMI 34.06 kg/m     General: No acute distress.   Eyes: Conjunctivae are normal.  MSK: Moves all extremities.  No obvious deformity.  Neuro: The patient is fully oriented. Speech is normal. Extraocular movements are intact without nystagmus. Facial nerve function is normal, rated as a House [place]ackmann 1. Gait is normal.   Psych: Normal mood and affect. Behavior is normal.      Labs:  Prolactin 17  Testosterone 13  FSH 79.6  LH 50.5  T4 (7/2024) 1.31    Imaging:  We reviewed the pituitary MRI completed 5/08/2025 in clinic today; this reveals a subtle 3 mm hypoenhancing lesion in the left inferior sella. The pituitary stalk is midline. Optic chiasm is nondisplaced.     Assessment:  Pituitary lesion, new patient consult    Plan:  We reviewed the natural history of pituitary adenomas, differential diagnosis including simple cysts, Rathke's cleft cyst, pituitary adenoma, and options for management including observation, endonasal resection, radiation (not thoroughly discussed today). Of these, I would favor close follow up with repeat MRI in 3 months. She'll complete remaining pituitary lab work as ordered today and I'll notify her of results. She was encouraged to reach out in the meantime with new concerns.      Sadaf Ervin PA-C  Department of Neurosurgery  Center for Skull Base and Pituitary Surgery  [facility]          Again, thank you for allowing me to participate in the care of your patient.      Sincerely,    Sadaf Ervin PA-C    "

## 2025-06-25 NOTE — PATIENT INSTRUCTIONS
Return in 3 months, with MRI prior.  Please have labs drawn at 8 AM at your convenience; I'll contact you with results.    Reach out anytime with new questions.

## 2025-06-25 NOTE — PROGRESS NOTES
[facility]  Department of Neurosurgery  Center for Skull Base and Pituitary Surgery    Name: Mira Luis  MRN: [id number]  Age: 54 year old  : 1970  Referring provider: Tia Spring CNP  2025      Chief Complaint:   Pituitary lesion, new patient consult    History of Present Illness:   Mira Luis is a 54 year old right handed female who is here today as a new patient regarding a pituitary lesion, found incidentally in May 2025 upon workup for a strange sensation of tension behind her right eye. This occurred last year as well and she was worked up and ultimately diagnosed with sinusitis. More recently when it recurred she met with her PCP who ordered brain imaging. This identified a subcentimeter hypoenhancement in the left inferior sella. She's here today unaccompanied. She feels well and has not had eye symptoms over the past month. She does have a history of headaches as a younger person, does not currently. She denies new blurry or double vision.     Of note, she follows with a Functional provider outside of [facility] and has been diagnosed with chronic Lyme disease in the past.     , no children. Nonsmoker.  Retired, did Tech consulting.    Review of Systems:   Pertinent items are noted in HPI or as in patient entered ROS below, remainder of complete ROS is negative.     Active Medications:     Current Outpatient Medications:     COMPOUNDED NON-CONTROLLED SUBSTANCE (CMPD RX) - PHARMACY TO MIX COMPOUNDED MEDICATION, Progesterone DR  150 mg capsule- take one at HS, Disp: 90 capsule, Rfl: 3    estradiol (VIVELLE-DOT) 0.05 MG/24HR bi-weekly patch, APPLY 1 PATCH TO CLEAN, DRY SKIN TWICE WEEKLY, Disp: , Rfl:     MAGNESIUM GLYCINATE PO, Take 1 capsule by mouth every morning. MAGNESIUM THREONATE, Disp: , Rfl:     Milk Thistle-Dand-Fennel-Licor (MILK THISTLE XTRA) CAPS capsule, Take 1 capsule by mouth every evening., Disp: , Rfl:     Multiple Vitamin (ONE-A-DAY ESSENTIAL) TABS,  Take 1 tablet by mouth every morning., Disp: , Rfl:     S-Adenosylmethionine (GIANNI-E PO), Take 1 capsule by mouth daily., Disp: , Rfl:     acetaminophen (TYLENOL) 325 MG tablet, Take 3 tablets (975 mg) by mouth every 6 hours as needed for mild pain., Disp: , Rfl:     BIESTROGEN, 20-80, 1.25 MG COMPOUND, Apply 2 mg topically every evening. 50/50 2MG/GRAM APPLY 2 PUMPS TOPICALLY ONCE A DAY, Disp: , Rfl:     COMPOUNDED NON-CONTROLLED SUBSTANCE (CMPD RX) - PHARMACY TO MIX COMPOUNDED MEDICATION, BI-EST 50:50 2mg/g- apply 2 clicks once a day, Disp: 90 g, Rfl: 3    ibuprofen (ADVIL/MOTRIN) 800 MG tablet, Take 1 tablet (800 mg) by mouth every 6 hours as needed for other (mild and/or inflammatory pain)., Disp: , Rfl:     progesterone (PROMETRIUM) 100 MG capsule, Take 150 mg by mouth At Bedtime, Disp: , Rfl:       Allergies:   Shellfish allergy and Sulfa antibiotics      Past Medical History:  Past Medical History:   Diagnosis Date    AGW (anogenital warts)     Arthritis of first metatarsophalangeal (MTP) joint of left foot 07/30/2021    ASCUS (atypical squamous cells of undetermined significance) on Pap smear 11/11/2013    neg HPV. plan to repeat pap/HPV in 1 year    CKD (chronic kidney disease)     Headache     Heart palpitations 10/1998    LSIL (low grade squamous intraepithelial lesion) on Pap smear 10/12/11, 6/2012 11/11/11 colp - LSIL    Lyme disease     Migraines Age 20's    Cluster    Obesity     PONV (postoperative nausea and vomiting)     Postmenopausal bleeding     Reticulocytosis      Patient Active Problem List   Diagnosis    Uterine fibroid    Fatigue    Obesity (BMI 30-39.9)    High vitamin D level    Elevated bilirubin    Impaired fasting glucose    Menopausal syndrome (hot flashes)    Hallux rigidus of left foot    Arthritis of first metatarsophalangeal (MTP) joint of left foot    Pain in joint of left foot    Vaccine reaction-flu 2013    Injury of finger, right, initial encounter        Past Surgical  "History:  Past Surgical History:   Procedure Laterality Date    CHEILECTOMY Left 10/19/2021    Procedure: LEFT FOOT CHEILECTOMY;  Surgeon: Arturo Harris DPM;  Location: [facility] OR    COLONOSCOPY N/A 8/19/2021    Procedure: COLONOSCOPY;  Surgeon: Corwin Remy MD;  Location: [facility] GI    DILATION AND CURETTAGE, OPERATIVE HYSTEROSCOPY WITH MORCELLATOR, COMBINED N/A 9/6/2024    Procedure: HYSTEROSCOPY, WITH DILATION AND CURETTAGE OF UTERUS USING MORCELLATOR;  Surgeon: Maria Elena Saavedra MD;  Location: [facility] OR    EXCISE VULVA WIDE LOCAL  1/16/2013    Procedure: EXCISE VULVA WIDE LOCAL;  Vaginal wide local excision of the vulva.   Diagnosis: vulva chondyloma.;  Surgeon: Pretty Collado MD;  Location: [facility] OR    EXCISE VULVA WIDE LOCAL      SURGICAL HISTORY OF -   2/2008    Fibula Fx       Family History:   Family History   Problem Relation Age of Onset    Ulcerative Colitis Mother     Arrhythmia Mother         s/p ablation    Hypotension Father     Hypertension Maternal Grandmother     Cancer Maternal Aunt     Cancer Paternal Aunt     Diabetes No family hx of     Cerebrovascular Disease No family hx of     Thyroid Disease No family hx of     Glaucoma No family hx of     Macular Degeneration No family hx of     Colon Cancer No family hx of     Anesthesia Reaction No family hx of     Deep Vein Thrombosis (DVT) No family hx of          Social History:   Social History     Tobacco Use    Smoking status: Never     Passive exposure: Never    Smokeless tobacco: Never   Vaping Use    Vaping status: Never Used   Substance Use Topics    Alcohol use: Not Currently    Drug use: No        Physical Exam:   /79 (BP Location: Right arm, Patient Position: Sitting)   Pulse 74   Resp 16   Ht 1.676 m (5' 6\")   Wt 95.7 kg (211 lb)   LMP 07/14/2020   SpO2 98%   BMI 34.06 kg/m     General: No acute distress.   Eyes: Conjunctivae are normal.  MSK: Moves all extremities.  No obvious deformity.  Neuro: The patient is fully " oriented. Speech is normal. Extraocular movements are intact without nystagmus. Facial nerve function is normal, rated as a House Brackmann 1. Gait is normal.   Psych: Normal mood and affect. Behavior is normal.      Labs:  Prolactin 17  Testosterone 13  FSH 79.6  LH 50.5  T4 (7/2024) 1.31    Imaging:  We reviewed the pituitary MRI completed 5/08/2025 in clinic today; this reveals a subtle 3 mm hypoenhancing lesion in the left inferior sella. The pituitary stalk is midline. Optic chiasm is nondisplaced.     Assessment:  Pituitary lesion, new patient consult    Plan:  We reviewed the natural history of pituitary adenomas, differential diagnosis including simple cysts, Rathke's cleft cyst, pituitary adenoma, and options for management including observation, endonasal resection, radiation (not thoroughly discussed today). Of these, I would favor close follow up with repeat MRI in 3 months. She'll complete remaining pituitary lab work as ordered today and I'll notify her of results. She was encouraged to reach out in the meantime with new concerns.      Sadaf Ervin PA-C  Department of Neurosurgery  Center for Skull Base and Pituitary Surgery  [facility]

## 2025-07-02 ENCOUNTER — OFFICE VISIT (OUTPATIENT)
Dept: PEDIATRICS | Facility: CLINIC | Age: 55
End: 2025-07-02
Payer: COMMERCIAL

## 2025-07-02 VITALS
HEART RATE: 56 BPM | TEMPERATURE: 97.9 F | SYSTOLIC BLOOD PRESSURE: 130 MMHG | OXYGEN SATURATION: 99 % | RESPIRATION RATE: 18 BRPM | HEIGHT: 66 IN | DIASTOLIC BLOOD PRESSURE: 86 MMHG | WEIGHT: 211.1 LBS | BODY MASS INDEX: 33.93 KG/M2

## 2025-07-02 DIAGNOSIS — B07.8 COMMON WART: Primary | ICD-10-CM

## 2025-07-02 DIAGNOSIS — Z76.89 ENCOUNTER TO ESTABLISH CARE: ICD-10-CM

## 2025-07-02 DIAGNOSIS — Z23 NEED FOR DIPHTHERIA-TETANUS-PERTUSSIS (TDAP) VACCINE: ICD-10-CM

## 2025-07-02 ASSESSMENT — PAIN SCALES - GENERAL: PAINLEVEL_OUTOF10: MILD PAIN (1)

## 2025-07-02 NOTE — PROGRESS NOTES
Assessment & Plan     Common wart  On right 3rd digit, onset 3 months ago after frequent tyson taping to a surrounding digit.   - Cryotherapy performed in clinic today   - After the area has healed, discussed that she may continue with over the counter remedies to achieve faster clearance if desired     Need for diphtheria-tetanus-pertussis (Tdap) vaccine  Overdue for tetanus vaccine, updated today.   - TDAP 10-64Y (ADACEL,BOOSTRIX)    Encounter to establish care   Mira has been looking to establish care with a new PCP. Reviewed her medical history today as detailed under HPI. Is due for an annual visit. Recommended she return with me (or another provider if she decides) for an annual visit and more detailed establish care discussions.       Subjective   Mira is a 54 year old, presenting for the following health issues:  Musculoskeletal Problem        7/2/2025     1:08 PM   Additional Questions   Roomed by Kristine   Accompanied by self     Musculoskeletal Problem    History of Present Illness       Reason for visit:  Removal, treatment, or referral for cyst(?) on right hand middle finger.  Symptom onset:  More than a month  Symptoms include:  Cyst  Symptom intensity:  Moderate  Symptom progression:  Worsening  Had these symptoms before:  No   She is taking medications regularly.        Here today for evaluation of a small lesion on her right 3rd digit. First noticed this about 3 months ago. Notably, fractured her right 4th digit in 3/2025 and has had the two fingers tyson taped together for a while when she first noticed the lesion arising. No discharge. Tender to touch, but otherwise not frankly painful. No surrounding redness.     Has also been looking for a new primary care provider.     We briefly went through her medical history today. Notable for:   - Diagnosis of chronic lyme disease made by a functional medicine doctor several years ago. States she had a tick bite during childhood.     - Now following with a  "different functional medicine provider (Justyn Baez MD at [facility]) primarily for menopausal symptoms for which she takes progesterone, estradiol patches, and a supplement called estrodim. Gets her hormones checked regularly for this.     - Menopausal with LMP in 2020. In 2024 had postmenopausal bleeding in context of HRT. Saw OB/GYN for this, unable to successfully complete endometrial biopsy in clinic so this was performed under sedation in 9/2024. Pathology ultimately showed inactive endometrium with tubal metaplasia, negative for any evidence of dysplasia or malignancy.     - Recently incidentally found to have a pituitary lesion on brain MRI - saw Neurosurgery for this on 6/25/25 with plan for repeat MRI on 8/27/25.     - CKD 2 per chart. Patient not aware of this diagnosis. Creatinine consistently slightly elevated ranging 1.06-1.09 since 2023. GFR 63 in 2023 and 2024, most recently 60 in 3/2025. Urine last checked for proteinuria in 2019 and negative at that time.           Objective    /86   Pulse 56   Temp 97.9  F (36.6  C) (Oral)   Resp 18   Ht 1.676 m (5' 6\")   Wt 95.8 kg (211 lb 1.6 oz)   LMP 07/14/2020   SpO2 99%   BMI 34.07 kg/m    Body mass index is 34.07 kg/m .  Physical Exam   GENERAL: alert and no distress  EYES: Eyes grossly normal to inspection, conjunctivae and sclerae normal  RESP: Breathing comfortably on room air.   CV: Warm and well perfused.   SKIN: Small raised verruca to the 3rd digit of the right hand.   NEURO: Mentation intact and speech normal  PSYCH: Affect normal/bright          Signed Electronically by: Susan Garcia MD    "

## 2025-07-03 NOTE — PROGRESS NOTES
STAFF NOTE:  I have seen the patient, discussed with the resident, was present during critical portion of visit, and was available to furnish services throughout the visit.  I agree with the history, physical and plan as documented above.    Zara Valadez MD  Internal Medicine-Pediatrics     The longitudinal plan of care for the diagnosis(es)/condition(s) as documented were addressed during this visit. Due to the added complexity in care, I will continue to support Mira in the subsequent management and with ongoing continuity of care.

## 2025-07-08 ENCOUNTER — LAB (OUTPATIENT)
Dept: LAB | Facility: CLINIC | Age: 55
End: 2025-07-08
Payer: COMMERCIAL

## 2025-07-08 DIAGNOSIS — D35.3 BENIGN NEOPLASM OF PITUITARY GLAND AND CRANIOPHARYNGEAL DUCT (POUCH) (H): ICD-10-CM

## 2025-07-08 DIAGNOSIS — D35.2 BENIGN NEOPLASM OF PITUITARY GLAND AND CRANIOPHARYNGEAL DUCT (POUCH) (H): ICD-10-CM

## 2025-07-08 LAB
CORTIS SERPL-MCNC: 12.1 UG/DL
T4 FREE SERPL-MCNC: 1.08 NG/DL (ref 0.9–1.7)
TSH SERPL DL<=0.005 MIU/L-ACNC: 3.02 UIU/ML (ref 0.3–4.2)

## 2025-07-08 PROCEDURE — 82533 TOTAL CORTISOL: CPT

## 2025-07-08 PROCEDURE — 83003 ASSAY GROWTH HORMONE (HGH): CPT

## 2025-07-08 PROCEDURE — 84439 ASSAY OF FREE THYROXINE: CPT

## 2025-07-08 PROCEDURE — 84443 ASSAY THYROID STIM HORMONE: CPT

## 2025-07-08 PROCEDURE — 36415 COLL VENOUS BLD VENIPUNCTURE: CPT

## 2025-07-08 PROCEDURE — 82024 ASSAY OF ACTH: CPT

## 2025-07-09 LAB
ACTH PLAS-MCNC: 26 PG/ML
GH SERPL-MCNC: 0.1 UG/L

## 2025-07-22 ENCOUNTER — THERAPY VISIT (OUTPATIENT)
Dept: OCCUPATIONAL THERAPY | Facility: CLINIC | Age: 55
End: 2025-07-22
Payer: COMMERCIAL

## 2025-07-22 DIAGNOSIS — S69.91XA INJURY OF FINGER, RIGHT, INITIAL ENCOUNTER: Primary | ICD-10-CM

## 2025-07-22 PROCEDURE — 97763 ORTHC/PROSTC MGMT SBSQ ENC: CPT | Mod: GO | Performed by: OCCUPATIONAL THERAPIST

## 2025-07-22 PROCEDURE — 97110 THERAPEUTIC EXERCISES: CPT | Mod: GO | Performed by: OCCUPATIONAL THERAPIST

## 2025-07-22 NOTE — PROGRESS NOTES
07/22/25 0500   Appointment Info   Treating Provider Scooter Wahl, KATY, OTR/L, CHT   Total/Authorized Visits 8 (POC)   Visits Used 5   Medical Diagnosis Injury of finger (right)   OT Tx Diagnosis Injury of finger (right)   Progress Note/Certification   Onset of Illness/Injury or Date of Surgery 03/24/25  (Referral)   Therapy Frequency 1x/week, every 4 weeks   Predicted Duration Additional 8 weeks   Progress Note Due Date 09/16/25   Progress Note Completed Date 07/22/25   Goals   OT Goals 1   OT Goal 1   Goal Identifier Goal I   Goal Description Patient will exhibit AROM of the right ring finger DIP -5/20 without pain or difficulty.   Rationale In order to maximize safety and independence with performance of self-care activities;In order to maximize safety and independence with ADL/IADLs   Goal Progress Improved DIP extension since last reassessment, neutral passively.  Goal extended.   Target Date 09/16/25   Subjective Report   Subjective Report My finger is progressing, I think. Maybe I'm just freaking out about it less.   Objective Measures   Objective Measures Objective Measure 1;Objective Measure 2;Objective Measure 5;Objective Measure 4;Objective Measure 3;Objective Measure 6   Objective Measure 2   Details Pain with activity   Objective Measure 3   Objective Measure /92   Details Active right ring finger MCP extension/flexion   Objective Measure 4   Objective Measure /100 pre-   Details Active right ring finger PIP extension/flexion   Objective Measure 5   Objective Measure -7/37 pre-, /62 to LUE   Details Active right ring finger DIP extension/flexion   Objective Measure 6   Objective Measure 60# to LUE, 44# to RUE   Details BUE  strength   Treatment Interventions (OT)   Interventions Orthotics;Therapeutic Procedure/Exercise;Therapeutic Activity   Therapeutic Procedure/Exercise   Therapeutic Procedures Ther Proc 2;Ther Proc 3;Ther Proc 4   Ther Proc 1 Objective measurements to assess growth towards  goals, facilitate differential and notes HEP.   PTRx Ther Proc 1 Finger Active Range of Motion Tendon Glides Fist Series   PTRx Ther Proc 1 - Details HEP   PTRx Ther Proc 2 Finger Active Range of Motion Reverse Blocking   PTRx Ther Proc 2 - Details HEP   PTRx Ther Proc 3 Finger Passive Range of Motion Composite Flexion   PTRx Ther Proc 3 - Details HEP, may progress intensity slowly.  Back off if there is any perceived worsening of DIP extension lag.   PTRx Ther Proc 4 Finger Active Range of Motion DIP Joint Blocking   PTRx Ther Proc 4 - Details HEP   PTRx Ther Proc 5 Hand Strengthening Gripping   PTRx Ther Proc 5 - Details HEP   Skilled Intervention For increased tendon gliding while protecting the healing distal phalanx fracture, improving mobility and reducing pain with ADL, IADL, leisure and sleep.   Patient Response/Progress Tolerated well, patient demonstrated and verbalized understanding.   Therapeutic Activity   Ther Act 1 Given preserved R RF DIP extension against gravity and improved PIP flexion, treatment is advanced as follows: Continue thermoplastic orthotic with at risk activity, Coban retrograde wrap during waking hours otherwise for edema control.  Reviewed the patient's radiographs with respect to concern for mallet fracture-esque terminal tendon involvement.  Given preserved, improved active DIP extension and resolving tenderness to palpation feel comfortable carefully progressing active and passive flexion as long as her range of motion remains stable.  She is to follow-up with orthopedist at her leisure, preferring to continue to hold off for approximately another month today.  Patient questions were answered to her satisfaction.   PTRx Ther Act 1 Orthosis Wear and Care   PTRx Ther Act 1 - Details Brace on with at-risk activity. Coban wrapping the remainder of the day.    Skilled Intervention For fracture protection while improving mobility with ADL, IADL, leisure and sleep.   Patient  Response/Progress Tolerated well, patient demonstrated and verbalized understanding.   Orthotics   Type of Orthotic Patient is fitted with a custom fabricated, volar based DIP extension orthotic from [employer].  This is to reduce any slack in the terminal tendon optimizing DIP extension.   Wear Schedule Nighttime only.   Skilled Intervention See above.   Patient Response/Progress Tolerated well, patient demonstrated and verbalized understanding.   Education   Learner/Method Patient;No Barriers to Learning;Pictures/Video;Demonstration;Reading;Listening   Plan   Home program Per PT Rx, see above regarding orthotic wear recommendations.   Updates to plan of care Continue 1 time per week, every 4 weeks for additional 8 weeks.         PLAN  Continue therapy per current plan of care.    Beginning/End Dates of Progress Note Reporting Period:  04/10/25 to 07/22/2025    Referring Provider:  Estelle Chua

## 2025-07-30 ENCOUNTER — LAB (OUTPATIENT)
Dept: LAB | Facility: CLINIC | Age: 55
End: 2025-07-30
Payer: COMMERCIAL

## 2025-07-30 DIAGNOSIS — E83.10 DISORDER OF IRON METABOLISM: ICD-10-CM

## 2025-07-30 DIAGNOSIS — R79.89 ELEVATED HOMOCYSTEINE: ICD-10-CM

## 2025-07-30 DIAGNOSIS — E66.9 OBESITY, UNSPECIFIED: ICD-10-CM

## 2025-07-30 DIAGNOSIS — N95.8 OTHER SPECIFIED MENOPAUSAL AND PERIMENOPAUSAL DISORDERS: Primary | ICD-10-CM

## 2025-07-30 DIAGNOSIS — Z79.890 HORMONE REPLACEMENT THERAPY (POSTMENOPAUSAL): ICD-10-CM

## 2025-07-30 DIAGNOSIS — D35.2 BENIGN NEOPLASM OF PITUITARY GLAND (H): ICD-10-CM

## 2025-07-30 PROCEDURE — 36415 COLL VENOUS BLD VENIPUNCTURE: CPT

## 2025-07-30 PROCEDURE — 83090 ASSAY OF HOMOCYSTEINE: CPT

## 2025-07-30 PROCEDURE — 82670 ASSAY OF TOTAL ESTRADIOL: CPT

## 2025-07-30 PROCEDURE — 84144 ASSAY OF PROGESTERONE: CPT

## 2025-07-30 PROCEDURE — 80061 LIPID PANEL: CPT

## 2025-07-31 LAB
CHOLEST SERPL-MCNC: 227 MG/DL
ESTRADIOL SERPL-MCNC: 25 PG/ML
FASTING STATUS PATIENT QL REPORTED: NO
HCYS SERPL-SCNC: 8.3 UMOL/L (ref 0–15)
HDLC SERPL-MCNC: 45 MG/DL
LDLC SERPL CALC-MCNC: 144 MG/DL
NONHDLC SERPL-MCNC: 182 MG/DL
PROGEST SERPL-MCNC: 1.7 NG/ML
TRIGL SERPL-MCNC: 191 MG/DL

## 2025-08-27 ENCOUNTER — ANCILLARY PROCEDURE (OUTPATIENT)
Dept: MRI IMAGING | Facility: CLINIC | Age: 55
End: 2025-08-27
Attending: PHYSICIAN ASSISTANT
Payer: COMMERCIAL

## 2025-08-27 ENCOUNTER — OFFICE VISIT (OUTPATIENT)
Dept: NEUROSURGERY | Facility: CLINIC | Age: 55
End: 2025-08-27
Attending: PHYSICIAN ASSISTANT
Payer: COMMERCIAL

## 2025-08-27 VITALS
OXYGEN SATURATION: 98 % | DIASTOLIC BLOOD PRESSURE: 78 MMHG | HEART RATE: 76 BPM | RESPIRATION RATE: 16 BRPM | SYSTOLIC BLOOD PRESSURE: 114 MMHG

## 2025-08-27 DIAGNOSIS — D35.2 BENIGN NEOPLASM OF PITUITARY GLAND AND CRANIOPHARYNGEAL DUCT (POUCH) (H): Primary | ICD-10-CM

## 2025-08-27 DIAGNOSIS — D35.3 BENIGN NEOPLASM OF PITUITARY GLAND AND CRANIOPHARYNGEAL DUCT (POUCH) (H): Primary | ICD-10-CM

## 2025-08-27 PROCEDURE — 3078F DIAST BP <80 MM HG: CPT | Performed by: PHYSICIAN ASSISTANT

## 2025-08-27 PROCEDURE — 3074F SYST BP LT 130 MM HG: CPT | Performed by: PHYSICIAN ASSISTANT

## 2025-08-27 PROCEDURE — 99213 OFFICE O/P EST LOW 20 MIN: CPT | Performed by: PHYSICIAN ASSISTANT

## 2025-08-27 SDOH — HEALTH STABILITY: PHYSICAL HEALTH: ON AVERAGE, HOW MANY DAYS PER WEEK DO YOU ENGAGE IN MODERATE TO STRENUOUS EXERCISE (LIKE A BRISK WALK)?: 5 DAYS

## 2025-08-27 SDOH — HEALTH STABILITY: PHYSICAL HEALTH: ON AVERAGE, HOW MANY MINUTES DO YOU ENGAGE IN EXERCISE AT THIS LEVEL?: 50 MIN

## 2025-08-28 ENCOUNTER — OFFICE VISIT (OUTPATIENT)
Dept: PEDIATRICS | Facility: CLINIC | Age: 55
End: 2025-08-28
Attending: STUDENT IN AN ORGANIZED HEALTH CARE EDUCATION/TRAINING PROGRAM
Payer: COMMERCIAL

## 2025-08-28 ASSESSMENT — PAIN SCALES - GENERAL: PAINLEVEL_OUTOF10: NO PAIN (0)

## (undated) DEVICE — GOWN XLG DISP 9545

## (undated) DEVICE — SYR 30ML SLIP TIP W/O NDL 302833

## (undated) DEVICE — BNDG KLING 4" 2236

## (undated) DEVICE — PACK EXTREMITY SOP15EXFSD

## (undated) DEVICE — SU VICRYL 3-0 PS-1 18" UND J683

## (undated) DEVICE — STRAP KNEE/BODY 31143004

## (undated) DEVICE — BLADE SAW SAGITTAL 25.5X9.5X.4MM FINE LINVATEC 5023-138

## (undated) DEVICE — NDL 25GA 1.5" 305127

## (undated) DEVICE — Device

## (undated) DEVICE — LINEN TOWEL PACK X5 5464

## (undated) DEVICE — IMM LIMB ELEVATOR DC40-0203

## (undated) DEVICE — SU PROLENE 4-0 FS-2 18' 8683G

## (undated) DEVICE — SOL NACL 0.9% IRRIG 1000ML BOTTLE 2F7124

## (undated) DEVICE — TUBING SUCTION MEDI-VAC 1/4"X20' N620A

## (undated) DEVICE — GLOVE PROTEXIS MICRO 8.0  2D73PM80

## (undated) DEVICE — KIT ENDO TURNOVER/PROCEDURE W/CLEAN A SCOPE LINERS 103888

## (undated) DEVICE — LINEN GOWN X4 5410

## (undated) DEVICE — SOL WATER IRRIG 1000ML BOTTLE 2F7114

## (undated) DEVICE — TOURNIQUET SGL BLADDER 18"X4" RED 5921-218-135

## (undated) DEVICE — SEAL SET MYOSURE ROD LENS SCOPE SINGLE USE 40-902

## (undated) DEVICE — DRSG GAUZE 4X4" 3033

## (undated) DEVICE — KIT PROCEDURE FLUENT IN/OUT FLOWPAK TISS TRAP FLT-112S

## (undated) DEVICE — BNDG ELASTIC 4"X5YDS UNSTERILE 6611-40

## (undated) DEVICE — GLOVE BIOGEL PI ULTRATOUCH G SZ 6.5 42165

## (undated) DEVICE — LIGHT HANDLE X2

## (undated) DEVICE — SYR 10ML FINGER CONTROL W/O NDL 309695

## (undated) DEVICE — GLOVE BIOGEL PI MICRO INDICATOR UNDERGLOVE SZ 6.5 48965

## (undated) DEVICE — GLOVE PROTEXIS W/NEU-THERA 8.0  2D73TE80

## (undated) DEVICE — DRAPE MINI C-ARM 4003

## (undated) DEVICE — PREP DYNA-HEX 4% CHG SCRUB 4OZ BOTTLE MDS098710

## (undated) DEVICE — DILATOR PROBE UTERINE OS CANAL FINDER 260-610

## (undated) DEVICE — SOL NACL 0.9% IRRIG 3000ML BAG 2B7477

## (undated) DEVICE — PREP CHLORAPREP 26ML TINTED ORANGE  260815

## (undated) DEVICE — SU VICRYL 4-0 PS-2 18" UND J496H

## (undated) DEVICE — SUCTION CANISTER MEDIVAC LINER 3000ML W/LID 65651-530

## (undated) DEVICE — PAD CHUX UNDERPAD 30X36" P3036C

## (undated) DEVICE — DECANTER TRANSFER DEVICE 2008S

## (undated) DEVICE — DRAPE STERI TOWEL LG 1010

## (undated) RX ORDER — FENTANYL CITRATE 50 UG/ML
INJECTION, SOLUTION INTRAMUSCULAR; INTRAVENOUS
Status: DISPENSED
Start: 2021-08-19

## (undated) RX ORDER — OXYCODONE HYDROCHLORIDE 5 MG/1
TABLET ORAL
Status: DISPENSED
Start: 2021-10-19

## (undated) RX ORDER — ACETAMINOPHEN 325 MG/1
TABLET ORAL
Status: DISPENSED
Start: 2024-09-06

## (undated) RX ORDER — ONDANSETRON 2 MG/ML
INJECTION INTRAMUSCULAR; INTRAVENOUS
Status: DISPENSED
Start: 2021-10-19

## (undated) RX ORDER — LIDOCAINE HYDROCHLORIDE 10 MG/ML
INJECTION, SOLUTION EPIDURAL; INFILTRATION; INTRACAUDAL; PERINEURAL
Status: DISPENSED
Start: 2024-09-06

## (undated) RX ORDER — LIDOCAINE HYDROCHLORIDE 20 MG/ML
INJECTION, SOLUTION EPIDURAL; INFILTRATION; INTRACAUDAL; PERINEURAL
Status: DISPENSED
Start: 2021-10-19

## (undated) RX ORDER — DEXAMETHASONE SODIUM PHOSPHATE 4 MG/ML
INJECTION, SOLUTION INTRA-ARTICULAR; INTRALESIONAL; INTRAMUSCULAR; INTRAVENOUS; SOFT TISSUE
Status: DISPENSED
Start: 2024-09-06

## (undated) RX ORDER — DEXAMETHASONE SODIUM PHOSPHATE 4 MG/ML
INJECTION, SOLUTION INTRA-ARTICULAR; INTRALESIONAL; INTRAMUSCULAR; INTRAVENOUS; SOFT TISSUE
Status: DISPENSED
Start: 2021-10-19

## (undated) RX ORDER — PROPOFOL 10 MG/ML
INJECTION, EMULSION INTRAVENOUS
Status: DISPENSED
Start: 2024-09-06

## (undated) RX ORDER — CEFAZOLIN SODIUM 2 G/100ML
INJECTION, SOLUTION INTRAVENOUS
Status: DISPENSED
Start: 2021-10-19

## (undated) RX ORDER — FENTANYL CITRATE 50 UG/ML
INJECTION, SOLUTION INTRAMUSCULAR; INTRAVENOUS
Status: DISPENSED
Start: 2024-09-06

## (undated) RX ORDER — PROPOFOL 10 MG/ML
INJECTION, EMULSION INTRAVENOUS
Status: DISPENSED
Start: 2021-10-19

## (undated) RX ORDER — FENTANYL CITRATE 50 UG/ML
INJECTION, SOLUTION INTRAMUSCULAR; INTRAVENOUS
Status: DISPENSED
Start: 2021-10-19

## (undated) RX ORDER — ONDANSETRON 2 MG/ML
INJECTION INTRAMUSCULAR; INTRAVENOUS
Status: DISPENSED
Start: 2024-09-06

## (undated) RX ORDER — KETOROLAC TROMETHAMINE 30 MG/ML
INJECTION, SOLUTION INTRAMUSCULAR; INTRAVENOUS
Status: DISPENSED
Start: 2024-09-06